# Patient Record
Sex: FEMALE | Race: WHITE | NOT HISPANIC OR LATINO | URBAN - METROPOLITAN AREA
[De-identification: names, ages, dates, MRNs, and addresses within clinical notes are randomized per-mention and may not be internally consistent; named-entity substitution may affect disease eponyms.]

---

## 2017-01-11 ENCOUNTER — INPATIENT (INPATIENT)
Facility: HOSPITAL | Age: 72
LOS: 7 days | Discharge: PSYCHIATRIC OR SUBSTANCE USE FACILITY | DRG: 189 | End: 2017-01-19
Attending: INTERNAL MEDICINE | Admitting: INTERNAL MEDICINE
Payer: MEDICARE

## 2017-01-11 VITALS
SYSTOLIC BLOOD PRESSURE: 100 MMHG | DIASTOLIC BLOOD PRESSURE: 50 MMHG | RESPIRATION RATE: 16 BRPM | WEIGHT: 164.91 LBS | TEMPERATURE: 98 F | OXYGEN SATURATION: 100 % | HEART RATE: 95 BPM

## 2017-01-11 DIAGNOSIS — I24.9 ACUTE ISCHEMIC HEART DISEASE, UNSPECIFIED: ICD-10-CM

## 2017-01-11 DIAGNOSIS — I26.99 OTHER PULMONARY EMBOLISM WITHOUT ACUTE COR PULMONALE: ICD-10-CM

## 2017-01-11 DIAGNOSIS — Z29.9 ENCOUNTER FOR PROPHYLACTIC MEASURES, UNSPECIFIED: ICD-10-CM

## 2017-01-11 DIAGNOSIS — J96.92 RESPIRATORY FAILURE, UNSPECIFIED WITH HYPERCAPNIA: ICD-10-CM

## 2017-01-11 DIAGNOSIS — I48.91 UNSPECIFIED ATRIAL FIBRILLATION: ICD-10-CM

## 2017-01-11 DIAGNOSIS — J44.9 CHRONIC OBSTRUCTIVE PULMONARY DISEASE, UNSPECIFIED: ICD-10-CM

## 2017-01-11 DIAGNOSIS — I10 ESSENTIAL (PRIMARY) HYPERTENSION: ICD-10-CM

## 2017-01-11 DIAGNOSIS — F32.9 MAJOR DEPRESSIVE DISORDER, SINGLE EPISODE, UNSPECIFIED: ICD-10-CM

## 2017-01-11 DIAGNOSIS — F41.9 ANXIETY DISORDER, UNSPECIFIED: ICD-10-CM

## 2017-01-11 LAB
ALBUMIN SERPL ELPH-MCNC: 3.4 G/DL — SIGNIFICANT CHANGE UP (ref 3.3–5)
ALP SERPL-CCNC: 64 U/L — SIGNIFICANT CHANGE UP (ref 40–120)
ALT FLD-CCNC: 31 U/L — SIGNIFICANT CHANGE UP (ref 12–78)
AMYLASE P1 CFR SERPL: 89 U/L — SIGNIFICANT CHANGE UP (ref 25–115)
ANION GAP SERPL CALC-SCNC: 9 MMOL/L — SIGNIFICANT CHANGE UP (ref 5–17)
APTT BLD: 32.4 SEC — SIGNIFICANT CHANGE UP (ref 27.5–37.4)
AST SERPL-CCNC: 32 U/L — SIGNIFICANT CHANGE UP (ref 15–37)
BASE EXCESS BLDA CALC-SCNC: -1.7 MMOL/L — SIGNIFICANT CHANGE UP (ref -2–2)
BASE EXCESS BLDA CALC-SCNC: -3.6 MMOL/L — LOW (ref -2–2)
BASE EXCESS BLDA CALC-SCNC: -3.9 MMOL/L — LOW (ref -2–2)
BILIRUB SERPL-MCNC: 0.5 MG/DL — SIGNIFICANT CHANGE UP (ref 0.2–1.2)
BLOOD GAS COMMENTS ARTERIAL: SIGNIFICANT CHANGE UP
BUN SERPL-MCNC: 58 MG/DL — HIGH (ref 7–23)
CALCIUM SERPL-MCNC: 8.4 MG/DL — LOW (ref 8.5–10.1)
CHLORIDE SERPL-SCNC: 96 MMOL/L — SIGNIFICANT CHANGE UP (ref 96–108)
CK MB CFR SERPL CALC: 10.1 NG/ML — HIGH (ref 0–3.6)
CO2 SERPL-SCNC: 31 MMOL/L — SIGNIFICANT CHANGE UP (ref 22–31)
CREAT SERPL-MCNC: 1.3 MG/DL — SIGNIFICANT CHANGE UP (ref 0.5–1.3)
FLUAV H1 2009 PAND RNA SPEC QL NAA+PROBE: DETECTED
GLUCOSE SERPL-MCNC: 137 MG/DL — HIGH (ref 70–99)
HCO3 BLDA-SCNC: 17 MMOL/L — LOW (ref 23–27)
HCO3 BLDA-SCNC: 19 MMOL/L — LOW (ref 23–27)
HCO3 BLDA-SCNC: 19 MMOL/L — LOW (ref 23–27)
HCT VFR BLD CALC: 45.7 % — HIGH (ref 34.5–45)
HGB BLD-MCNC: 14.9 G/DL — SIGNIFICANT CHANGE UP (ref 11.5–15.5)
HOROWITZ INDEX BLDA+IHG-RTO: 40 — SIGNIFICANT CHANGE UP
HOROWITZ INDEX BLDA+IHG-RTO: 40 — SIGNIFICANT CHANGE UP
INR BLD: 1.07 RATIO — SIGNIFICANT CHANGE UP (ref 0.88–1.16)
LACTATE SERPL-SCNC: 0.7 MMOL/L — SIGNIFICANT CHANGE UP (ref 0.7–2)
LIDOCAIN IGE QN: 208 U/L — SIGNIFICANT CHANGE UP (ref 73–393)
LYMPHOCYTES # BLD AUTO: 12 % — LOW (ref 13–44)
MCHC RBC-ENTMCNC: 32.6 PG — SIGNIFICANT CHANGE UP (ref 27–34)
MCHC RBC-ENTMCNC: 32.7 GM/DL — SIGNIFICANT CHANGE UP (ref 32–36)
MCV RBC AUTO: 99.6 FL — SIGNIFICANT CHANGE UP (ref 80–100)
MONOCYTES NFR BLD AUTO: 15 % — HIGH (ref 1–9)
NEUTROPHILS NFR BLD AUTO: 67 % — SIGNIFICANT CHANGE UP (ref 43–77)
NT-PROBNP SERPL-SCNC: 8880 PG/ML — HIGH (ref 0–125)
PCO2 BLDA: 108 MMHG — CRITICAL HIGH (ref 32–46)
PCO2 BLDA: 109 MMHG — CRITICAL HIGH (ref 32–46)
PCO2 BLDA: 84 MMHG — CRITICAL HIGH (ref 32–46)
PH BLDA: 7.01 — CRITICAL LOW (ref 7.35–7.45)
PH BLDA: 7.04 — CRITICAL LOW (ref 7.35–7.45)
PH BLDA: 7.1 — CRITICAL LOW (ref 7.35–7.45)
PLATELET # BLD AUTO: 216 K/UL — SIGNIFICANT CHANGE UP (ref 150–400)
PO2 BLDA: 107 MMHG — SIGNIFICANT CHANGE UP (ref 74–108)
PO2 BLDA: 72 MMHG — LOW (ref 74–108)
PO2 BLDA: 85 MMHG — SIGNIFICANT CHANGE UP (ref 74–108)
POTASSIUM SERPL-MCNC: 4.2 MMOL/L — SIGNIFICANT CHANGE UP (ref 3.5–5.3)
POTASSIUM SERPL-SCNC: 4.2 MMOL/L — SIGNIFICANT CHANGE UP (ref 3.5–5.3)
PROCALCITONIN SERPL-MCNC: 0.18 NG/ML — HIGH (ref 0–0.05)
PROT SERPL-MCNC: 7.4 G/DL — SIGNIFICANT CHANGE UP (ref 6–8.3)
PROTHROM AB SERPL-ACNC: 11.9 SEC — SIGNIFICANT CHANGE UP (ref 10–13.1)
RAPID RVP RESULT: DETECTED
RBC # BLD: 4.58 M/UL — SIGNIFICANT CHANGE UP (ref 3.8–5.2)
RBC # FLD: 12.5 % — SIGNIFICANT CHANGE UP (ref 10.3–14.5)
SAO2 % BLDA: 88 % — LOW (ref 92–96)
SAO2 % BLDA: 94 % — SIGNIFICANT CHANGE UP (ref 92–96)
SAO2 % BLDA: 95 % — SIGNIFICANT CHANGE UP (ref 92–96)
SODIUM SERPL-SCNC: 136 MMOL/L — SIGNIFICANT CHANGE UP (ref 135–145)
TROPONIN I SERPL-MCNC: 0.14 NG/ML — HIGH (ref 0.01–0.04)
WBC # BLD: 11.1 K/UL — HIGH (ref 3.8–10.5)
WBC # FLD AUTO: 11.1 K/UL — HIGH (ref 3.8–10.5)

## 2017-01-11 PROCEDURE — 99291 CRITICAL CARE FIRST HOUR: CPT

## 2017-01-11 PROCEDURE — 93010 ELECTROCARDIOGRAM REPORT: CPT

## 2017-01-11 PROCEDURE — 99285 EMERGENCY DEPT VISIT HI MDM: CPT

## 2017-01-11 PROCEDURE — 71010: CPT | Mod: 26

## 2017-01-11 RX ORDER — SODIUM CHLORIDE 9 MG/ML
3 INJECTION INTRAMUSCULAR; INTRAVENOUS; SUBCUTANEOUS ONCE
Qty: 0 | Refills: 0 | Status: COMPLETED | OUTPATIENT
Start: 2017-01-11 | End: 2017-01-11

## 2017-01-11 RX ORDER — ONDANSETRON 8 MG/1
4 TABLET, FILM COATED ORAL ONCE
Qty: 0 | Refills: 0 | Status: COMPLETED | OUTPATIENT
Start: 2017-01-11 | End: 2017-01-11

## 2017-01-11 RX ORDER — SODIUM CHLORIDE 9 MG/ML
1000 INJECTION INTRAMUSCULAR; INTRAVENOUS; SUBCUTANEOUS ONCE
Qty: 0 | Refills: 0 | Status: COMPLETED | OUTPATIENT
Start: 2017-01-11 | End: 2017-01-11

## 2017-01-11 RX ORDER — NOREPINEPHRINE BITARTRATE/D5W 8 MG/250ML
0.3 PLASTIC BAG, INJECTION (ML) INTRAVENOUS
Qty: 8 | Refills: 0 | Status: DISCONTINUED | OUTPATIENT
Start: 2017-01-11 | End: 2017-01-12

## 2017-01-11 RX ORDER — ALPRAZOLAM 0.25 MG
1 TABLET ORAL
Qty: 0 | Refills: 0 | COMMUNITY

## 2017-01-11 RX ORDER — AZITHROMYCIN 500 MG/1
500 TABLET, FILM COATED ORAL EVERY 24 HOURS
Qty: 0 | Refills: 0 | Status: DISCONTINUED | OUTPATIENT
Start: 2017-01-11 | End: 2017-01-17

## 2017-01-11 RX ORDER — ASPIRIN/CALCIUM CARB/MAGNESIUM 324 MG
325 TABLET ORAL ONCE
Qty: 0 | Refills: 0 | Status: COMPLETED | OUTPATIENT
Start: 2017-01-11 | End: 2017-01-11

## 2017-01-11 RX ORDER — FUROSEMIDE 40 MG
40 TABLET ORAL ONCE
Qty: 0 | Refills: 0 | Status: COMPLETED | OUTPATIENT
Start: 2017-01-11 | End: 2017-01-11

## 2017-01-11 RX ORDER — PANTOPRAZOLE SODIUM 20 MG/1
40 TABLET, DELAYED RELEASE ORAL DAILY
Qty: 0 | Refills: 0 | Status: DISCONTINUED | OUTPATIENT
Start: 2017-01-11 | End: 2017-01-12

## 2017-01-11 RX ORDER — RIVAROXABAN 15 MG-20MG
1 KIT ORAL
Qty: 0 | Refills: 0 | COMMUNITY

## 2017-01-11 RX ORDER — METOPROLOL TARTRATE 50 MG
50 TABLET ORAL ONCE
Qty: 0 | Refills: 0 | Status: COMPLETED | OUTPATIENT
Start: 2017-01-11 | End: 2017-01-11

## 2017-01-11 RX ORDER — SODIUM CHLORIDE 9 MG/ML
1000 INJECTION INTRAMUSCULAR; INTRAVENOUS; SUBCUTANEOUS
Qty: 0 | Refills: 0 | Status: DISCONTINUED | OUTPATIENT
Start: 2017-01-11 | End: 2017-01-13

## 2017-01-11 RX ORDER — AZITHROMYCIN 500 MG/1
500 TABLET, FILM COATED ORAL ONCE
Qty: 0 | Refills: 0 | Status: COMPLETED | OUTPATIENT
Start: 2017-01-11 | End: 2017-01-11

## 2017-01-11 RX ADMIN — ONDANSETRON 4 MILLIGRAM(S): 8 TABLET, FILM COATED ORAL at 17:09

## 2017-01-11 RX ADMIN — Medication 40 MILLIGRAM(S): at 16:53

## 2017-01-11 RX ADMIN — PANTOPRAZOLE SODIUM 40 MILLIGRAM(S): 20 TABLET, DELAYED RELEASE ORAL at 23:17

## 2017-01-11 RX ADMIN — Medication 325 MILLIGRAM(S): at 16:55

## 2017-01-11 RX ADMIN — SODIUM CHLORIDE 1000 MILLILITER(S): 9 INJECTION INTRAMUSCULAR; INTRAVENOUS; SUBCUTANEOUS at 17:41

## 2017-01-11 RX ADMIN — Medication 30 MILLIGRAM(S): at 23:17

## 2017-01-11 RX ADMIN — Medication 42.08 MICROGRAM(S)/KG/MIN: at 20:21

## 2017-01-11 RX ADMIN — AZITHROMYCIN 255 MILLIGRAM(S): 500 TABLET, FILM COATED ORAL at 17:01

## 2017-01-11 RX ADMIN — SODIUM CHLORIDE 3 MILLILITER(S): 9 INJECTION INTRAMUSCULAR; INTRAVENOUS; SUBCUTANEOUS at 16:10

## 2017-01-11 RX ADMIN — SODIUM CHLORIDE 1000 MILLILITER(S): 9 INJECTION INTRAMUSCULAR; INTRAVENOUS; SUBCUTANEOUS at 19:30

## 2017-01-11 RX ADMIN — SODIUM CHLORIDE 1000 MILLILITER(S): 9 INJECTION INTRAMUSCULAR; INTRAVENOUS; SUBCUTANEOUS at 19:07

## 2017-01-11 RX ADMIN — Medication 50 MILLIGRAM(S): at 16:11

## 2017-01-11 RX ADMIN — SODIUM CHLORIDE 1000 MILLILITER(S): 9 INJECTION INTRAMUSCULAR; INTRAVENOUS; SUBCUTANEOUS at 16:10

## 2017-01-11 NOTE — ED ADULT NURSE REASSESSMENT NOTE - NS ED NURSE REASSESS COMMENT FT1
report received from [facility], pt on bipap sats 100%, on cardiac, sat bp monitor. bp 80/54, md aware and 4th L NS bolus ordered and hung. will await CT after BP stable.

## 2017-01-11 NOTE — H&P ADULT. - NEUROLOGICAL DETAILS
responds to verbal commands/responds to pain/sensation intact responds to verbal commands/responds to pain/cranial nerves intact/normal strength/sensation intact

## 2017-01-11 NOTE — ED ADULT NURSE REASSESSMENT NOTE - NS ED NURSE REASSESS COMMENT FT1
Noted + crackles bilaterally 02Sat 95 % on 5 liters cardiac monitor controlled afib + cough unproductive

## 2017-01-11 NOTE — H&P ADULT. - ASSESSMENT
71 y.o F w/PMHx of Afib, anxiety, COPD, HTN, depression, Nicotine dependence was brought in by EMS for Fatigue, palpitation, SOB, and weakness a/w respiratory failure possible 2/2 URI vs PE and r/o PE and ACS 71 y.o F w/PMHx of Afib, anxiety, COPD, HTN, depression, Nicotine dependence was brought in by EMS for Fatigue, palpitation, SOB, and weakness a/w respiratory failure possible 2/2 influenza URI vs PE and r/o PE and ACS 71 y.o F w/PMHx of Afib, anxiety, COPD, HTN, depression, Nicotine dependence was brought in by EMS for Fatigue, palpitation, SOB, and weakness a/w respiratory failure possible 2/2 COPD exacerbation, with influenza AH3 + URI and r/o PE and ACS 71 y.o F w/PMHx of Afib, anxiety, COPD, HTN, depression, Nicotine dependence was brought in by EMS for Fatigue, palpitation, SOB, and weakness a/w respiratory failure possible 2/2 COPD exacerbation, with influenza AH3 + URI and r/o PE and r/o ACS,Rapid A Fib.

## 2017-01-11 NOTE — ED PROVIDER NOTE - CARE PLAN
Principal Discharge DX:	Respiratory failure with hypercapnia, unspecified chronicity  Instructions for follow-up, activity and diet:	admit  Secondary Diagnosis:	Atrial fibrillation  Secondary Diagnosis:	Rapid atrial fibrillation

## 2017-01-11 NOTE — H&P ADULT. - COMMENTS
on bipap,  only still report sob, denies CP/Abd pain/urinary symptoms /dizziness/HA. on bipap,  only still report SOB, denies CP/Abd pain/urinary symptoms /dizziness/HA.

## 2017-01-11 NOTE — PROVIDER CONTACT NOTE (EICU) - SITUATION
new admission with hypercapnia,  hypoxemia, hypotension, required intubation for failed NIV. smoker daily and influ AH3 positive

## 2017-01-11 NOTE — H&P ADULT. - PROBLEM SELECTOR PLAN 5
-hold home valsartan and  bisoprolol  -future management per ICU team -hold home valsartan and  bisoprolol for Hypotension  -future management per ICU team

## 2017-01-11 NOTE — ED PROVIDER NOTE - CRITICAL CARE PROVIDED
interpretation of diagnostic studies/documentation/direct patient care (not related to procedure)/additional history taking/consultation with other physicians/consult w/ pt's family directly relating to pts condition

## 2017-01-11 NOTE — ED PROVIDER NOTE - PROGRESS NOTE DETAILS
pt initial vitals stable on arrival with reported improvement in symptoms. IVF begun no evidence of sepsis at this time.  Initial labs drawn for cardiac markers.  After initial presentation pt required oxygen to maintain O2 sats.  First requried 2 liters but then increased to 5 liters.  Chest x-ray relieved no acute abnormality so there was concern for viral pt initial vitals stable on arrival with reported improvement in symptoms. IVF begun no evidence of sepsis at this time.  Initial labs drawn for cardiac markers.  After initial presentation pt required oxygen to maintain O2 sats.  First required 2 liters but then increased to 5 liters.  Chest x-ray relieved no acute abnormality so there was concern for viral infection vs PE.  BP then began to trend down despite IVF and ICU was consulted.  Pt accepted to the unit at this time

## 2017-01-11 NOTE — PATIENT PROFILE ADULT. - MENTAL HEALTH CONDITIONS/SYMPTOMS, PROFILE
excessive fear/altered thought process/pt bemoes very derpressed when she gets sick and says she is going to die/loss of interest in self/anxiety disorder

## 2017-01-11 NOTE — H&P ADULT. - PROBLEM SELECTOR PLAN 2
-f/u cardiac enzyme x2   -am EKG  -consult- ( Cardio)  -future management per ICU team -f/u cardiac enzyme x2 ,Abnormal Troponin 2 to demand ischemia  -am EKG  -consult- ( Cardio)  -future management per ICU team

## 2017-01-11 NOTE — PROVIDER CONTACT NOTE (EICU) - RECOMMENDATIONS
- intubation; vent bundle  - would start steroids and azithromycin (COPD)  - tamiflu ordered   - I would perform a CTPA and/or TTE to eval for VTE and this could account for the hypoxia and hypotension. If delay would anticoagulate until we can further evaluate this.

## 2017-01-11 NOTE — H&P ADULT. - PROBLEM SELECTOR PLAN 1
-possible 2/2 URI vs PE   -admit pt to ICU  -intubation  -CTA chest r/o PE  -c/w Levophed for BP support  -AM cbc/CMP   -future management per ICU team -possible 2/2 URI vs PE   -admit pt to ICU  -intubation  -CTA chest r/o PE  -c/w Levophed for BP support  -AM cbc/CMP   -droplet precaution  -Tamiflu  -future management per ICU team -possible 2/2 URI vs PE   -admit pt to ICU  -intubation  -CTA chest r/o PE  -c/w Levophed for BP support  -AM cbc/CMP   -droplet precaution  -Tamiflu for 5 days  -future management per ICU team -possible 2/2 COPD Exacerbation with Flu AH3 + r/o PE   -admit pt to ICU-Ac Hypercapnic,Hypoxic respiratory failure with Respiratory acidosis  -intubation elective  -CTA chest r/o PE  -c/w Levophed for BP support  -AM cbc/CMP   -droplet precaution  -Tamiflu 75 mg q 12 hrs for 5 days  ID Dr FABIÁN [place]  -future management per ICU team -possible 2/2 COPD Exacerbation with Flu AH3 + r/o PE   -admit pt to ICU-Ac Hypercapnic,Hypoxic respiratory failure with Respiratory acidosis  -intubation elective,IV Zithromax   -CTA chest r/o PE  -c/w Levophed for BP support,S/p IV Fluid bolus given in ER  -AM cbc/CMP   -droplet precaution  -Tamiflu to start  ID Dr FABIÁN gutiérrez  -future management per ICU team -possible 2/2 COPD Exacerbation with Flu AH3 + r/o PE   -admit pt to ICU-Ac Hypercapnic,Hypoxic respiratory failure with Respiratory acidosis  -BiPAP elective,IV Zithromax   -CTA chest r/o PE  -c/w Levophed for BP support,S/p IV Fluid bolus given in ER  -AM cbc/CMP   -droplet precaution  -Tamiflu to start  ID Dr FABIÁN gutiérrez  -future management per ICU team -possible 2/2 COPD Exacerbation with Flu AH3 + r/o PE   -admit pt to ICU-Ac Hypercapnic,Hypoxic respiratory failure with Respiratory acidosis  -BiPAP elective,IV Zithromax   -CTA chest r/o PE  -c/w Levophed for BP support,S/p IV Fluid bolus given in ER  -AM CBC/CMP   -droplet precaution  -Tamiflu BID x 5 days  ID Dr Brandt gutiérrez  -future management per ICU team

## 2017-01-11 NOTE — ED PROVIDER NOTE - OBJECTIVE STATEMENT
Pt is a 72 yo female who presents to the ED with a cc of A-fib and SOB.  Pt reports that she has a history of A-fib and COPD.  She reports that the entire family has been experiencing URI symptoms. Since Saturday she reports that she has had a cough productive of clear sputum, diarrhea, nausea, and SOB.  Pt denies fever, V/C, CP, abd pain.  Pt reports that the SOB progressively worsened and on Sunday into Monday she was so SOB she could barely ambulate.  Pt reports that she finally called EMS because she could not walk to the bathroom secondary to her SOB.  Upon EMS arrival pt was noted to be in A-fib RVR.  She was given Cardizem 20 mg IVP was improvement in rate.  Pt reported significant improvement in breathing after this.  Pt denies ever feeling palpations or the sensation that her heart was racing.  She does admit that she did not take her beta blocker today.  Reports that she took her Xarelto.  Pt also report history of anxiety.  Smokes daily but is not on home oxygen.  has one glass of wine every evening

## 2017-01-11 NOTE — ED ADULT TRIAGE NOTE - CHIEF COMPLAINT QUOTE
From c/o rapid heart beat and difficulty breathing.   EMS states heart rate between 140-160 gave 20 mg cardizem w positive results

## 2017-01-11 NOTE — H&P ADULT. - PROBLEM SELECTOR PLAN 3
-c/w Xarelto, hold bisoprolol for now 2/2 hypotension  -future management per ICU team -c/w Xarelto, hold bisoprolol for now 2/2 hypotension  -future management per ICU team & cardiology -c/w Xarelto, hold bisoprolol for now 2/2 hypotension,IV Fluids  -future management per ICU team & cardiology -c/w Xarelto, hold bisoprolol for now 2/2 hypotension, IV Fluids  -future management per ICU team & cardiology

## 2017-01-11 NOTE — H&P ADULT. - PMH
Afib    Anxiety    Arm fracture    Depression    Emphysema of lung    Hypertension    Nicotine dependence

## 2017-01-11 NOTE — H&P ADULT. - HISTORY OF PRESENT ILLNESS
71 y.o F w/PMHx of Afib, anxiety, COPD, HTN, depression, Nicotine dependence was brought in by EMS for Fatigue, palpitation, SOB, and weakness. Per pt's , pt had nonproductive cough, nausea, decrease appetite for 5 days. Per pt 's , pt also reported mild subjective fever an chills, but denies of sore throat/cp/HA/dizziness/constipation/diarrhea/urinary discomfort. Pt's  cold like symptom for last week and live with the pt. Per pt's cece, 71 y.o F w/PMHx of Afib, anxiety, COPD, HTN, depression, Nicotine dependence was brought in by EMS for Fatigue, palpitation, SOB, and weakness. Per pt's , pt had nonproductive cough, nausea, decrease appetite for 5 days. Per pt 's , pt also reported mild subjective fever an chills, but denies of sore throat/cp/HA/dizziness/constipation/diarrhea/urinary discomfort. Pt's  cold like symptom for last week and live with the pt. Per pt's , pt had severe depression, anxiety, nicotine dependent,  she  for years 71 y.o F w/PMHx of Afib, anxiety, COPD, HTN, depression, Nicotine dependence was brought in by EMS for Fatigue, palpitation, SOB, and weakness. Per pt's , pt had nonproductive cough, nausea, decrease appetite for 5 days. Per pt 's , pt also reported mild subjective fever an chills, but denies of sore throat/cp/HA/dizziness/constipation/diarrhea/urinary discomfort. Pt's  cold like symptom for last week and live with the pt. Per pt's , pt had severe depression, anxiety, nicotine dependent,  she limited moved live style for years. not going out of house and sitting in chairs and smoking everyday. Pt denies suicide idea but loss interest in life and very nervous about been sick.  Pt had walker but barely use it.     In ED, Pt was tachycardia at 100s, hypotensive  at 80/54. leukocytosis at 11.1, procalcitonin at elevated at 0.18, BNP elevated at 8880, trop at 0.136 . ABG showed respiratory acidosis. CXR showed hyperinflated lung Pt was put on BIpap, a 71 y.o F w/PMHx of Afib, anxiety, COPD, HTN, depression, Nicotine dependence was brought in by EMS for Fatigue, palpitation, SOB, and weakness. Per pt's , pt had nonproductive cough, nausea, decrease appetite for 5 days. Per pt 's , pt also reported mild subjective fever an chills, but denies of sore throat/cp/HA/dizziness/constipation/diarrhea/urinary discomfort. Pt's  cold like symptom for last week and live with the pt. Today, pt was labor breathing and worsening SOB and worsening weakness.  call the EMS.  Per pt's , pt had severe depression, anxiety, nicotine dependent,  she limited moved live style for years. not going out of house and sitting in chairs and smoking everyday. Pt denies suicide idea but loss interest in life and very nervous about been sick.  Pt had walker but barely use it.     In ED, Pt was tachycardia at 100s, hypotensive  at 80/54. leukocytosis at 11.1, procalcitonin at elevated at 0.18, BNP elevated at 8880, trop at 0.136 . ABG showed respiratory acidosis. CXR showed hyperinflated lung.  Pt was put on BIpap for respiratory stress.later , pt develop hypotension and was evaluated by ICU PA and Carido( ). Pt is on levophed drip and will be admit to ICU. Pt 's ABG worse and pt was intubate in ICU. CTA chest pending after the intubation. 71 y.o F w/PMHx of Afib, anxiety, COPD, HTN, depression, Nicotine dependence was brought in by EMS for Fatigue, palpitation, SOB, and weakness. Per pt's , pt had nonproductive cough, nausea, decrease appetite for 5 days. Per pt 's , pt also reported mild subjective fever an chills, but denies of sore throat/cp/HA/dizziness/constipation/diarrhea/urinary discomfort. Pt's  cold like symptom for last week and live with the pt. Today, pt was labor breathing and worsening SOB and worsening weakness.  call the EMS.  Per pt's , pt had severe depression, anxiety, nicotine dependent,  she limited moved live style for years. not going out of house and sitting in chairs and smoking everyday. Pt denies suicide idea but loss interest in life and very nervous about been sick.  Pt had walker but barely use it.     In ED, Pt was tachycardia at 100s, hypotensive  at 80/54. leukocytosis at 11.1, procalcitonin at elevated at 0.18, BNP elevated at 8880, trop at 0.136 . ABG showed respiratory acidosis. Influenza A+ , CXR showed hyperinflated lung.  Pt was put on BIpap for respiratory stress.later , pt develop hypotension and was evaluated by ICU PA and Carido( ). Pt is on levophed drip and will be admit to ICU. Pt 's ABG worse and pt was intubate in ICU. CTA chest pending after the intubation. 71 y.o F w/PMHx of Afib, anxiety, COPD, HTN, depression, Nicotine dependence was brought in by EMS for Fatigue, palpitation, SOB, and weakness. Per pt's , pt had nonproductive cough, nausea, decrease appetite for 5 days. Per pt 's , pt also reported mild subjective fever an chills, but denies of sore throat/cp/HA/dizziness/constipation/diarrhea/urinary discomfort. Pt's  cold like symptom for last week and live with the pt. Today, pt was labor breathing and worsening SOB and worsening weakness.  call the EMS.  Per pt's , pt had severe depression, anxiety, nicotine dependent,  she limited moved live style for years. not going out of house and sitting in chairs and smoking everyday. Pt denies suicide idea but loss interest in life and very nervous about been sick.  Pt had walker but barely use it.     In ED, Pt was tachycardia at 100s, hypotensive  at 80/54. leukocytosis at 11.1, procalcitonin at elevated at 0.18, BNP elevated at 8880, trop at 0.136 . ABG showed respiratory acidosis. Influenza A+ , CXR showed hyperinflated lung.  Pt was put on BIpap for respiratory stress.later , pt develop hypotension and was evaluated by ICU PA and Carido( ). Pt is on levophed drip and will be admit to ICU. Pt 's ABG worse and pt was placed on BiPAP . CTA chest pending after the intubation.

## 2017-01-11 NOTE — H&P ADULT. - PROBLEM SELECTOR PLAN 8
-AC depend on ICU team and CTA chest result  - fall precaution/ bed rest  -NPO  -aspiration precaution  --c/w citalopram and xanax   -consult ( Psych)   -future management per ICU team -AC,pt  on  Xarelto ,CTA chest Angio-Pending  - fall precaution/ bed rest  -NPO  -aspiration precaution  --c/w citalopram and xanax   -consult ( Psych)   -future management per ICU team

## 2017-01-11 NOTE — H&P ADULT. - PROBLEM SELECTOR PLAN 4
-vent support  -future management per ICU team -vent support, Nebs, SMOKING cessation  -future management per ICU team  -Pulmonary Dr Link

## 2017-01-11 NOTE — H&P ADULT. - GASTROINTESTINAL DETAILS
no distention/no masses palpable/soft/no bruit/bowel sounds normal/nontender no rigidity/no masses palpable/soft/nontender/no distention/no bruit/bowel sounds normal/no organomegaly

## 2017-01-11 NOTE — PATIENT PROFILE ADULT. - REASON FOR ADMISSION
difficulty breathing called ambulance couldn't get oob difficulty breathing called ambulance couldn't get oob.  pt has white coat syndrome and becomes depressed and withdrawn and fears dying when sick

## 2017-01-11 NOTE — ED ADULT NURSE NOTE - OBJECTIVE STATEMENT
71 yr old female brought in from home with  difficulty breathing brought in by EMS Cardizem given in the field HR 97 -102  . + cough unproductive

## 2017-01-12 ENCOUNTER — CLINICAL ADVICE (OUTPATIENT)
Age: 72
End: 2017-01-12

## 2017-01-12 LAB
ALBUMIN SERPL ELPH-MCNC: 2.6 G/DL — LOW (ref 3.3–5)
ALP SERPL-CCNC: 93 U/L — SIGNIFICANT CHANGE UP (ref 40–120)
ALT FLD-CCNC: 93 U/L — HIGH (ref 12–78)
ANION GAP SERPL CALC-SCNC: 7 MMOL/L — SIGNIFICANT CHANGE UP (ref 5–17)
APPEARANCE UR: ABNORMAL
AST SERPL-CCNC: 112 U/L — HIGH (ref 15–37)
BASE EXCESS BLDA CALC-SCNC: -1.5 MMOL/L — SIGNIFICANT CHANGE UP (ref -2–2)
BASE EXCESS BLDA CALC-SCNC: -2.6 MMOL/L — LOW (ref -2–2)
BASE EXCESS BLDA CALC-SCNC: -2.7 MMOL/L — LOW (ref -2–2)
BILIRUB SERPL-MCNC: 0.3 MG/DL — SIGNIFICANT CHANGE UP (ref 0.2–1.2)
BILIRUB UR-MCNC: NEGATIVE — SIGNIFICANT CHANGE UP
BLOOD GAS COMMENTS ARTERIAL: SIGNIFICANT CHANGE UP
BUN SERPL-MCNC: 43 MG/DL — HIGH (ref 7–23)
CALCIUM SERPL-MCNC: 7 MG/DL — LOW (ref 8.5–10.1)
CHLORIDE SERPL-SCNC: 108 MMOL/L — SIGNIFICANT CHANGE UP (ref 96–108)
CK MB BLD-MCNC: 1.8 % — SIGNIFICANT CHANGE UP (ref 0–3.5)
CK MB CFR SERPL CALC: 11.5 NG/ML — HIGH (ref 0–3.6)
CK SERPL-CCNC: 657 U/L — HIGH (ref 26–192)
CO2 SERPL-SCNC: 25 MMOL/L — SIGNIFICANT CHANGE UP (ref 22–31)
COLOR SPEC: YELLOW — SIGNIFICANT CHANGE UP
CREAT SERPL-MCNC: 0.87 MG/DL — SIGNIFICANT CHANGE UP (ref 0.5–1.3)
DIFF PNL FLD: ABNORMAL
GLUCOSE SERPL-MCNC: 132 MG/DL — HIGH (ref 70–99)
GLUCOSE UR QL: NEGATIVE — SIGNIFICANT CHANGE UP
HCO3 BLDA-SCNC: 20 MMOL/L — LOW (ref 23–27)
HCO3 BLDA-SCNC: 21 MMOL/L — LOW (ref 23–27)
HCO3 BLDA-SCNC: 21 MMOL/L — LOW (ref 23–27)
HCT VFR BLD CALC: 43.8 % — SIGNIFICANT CHANGE UP (ref 34.5–45)
HGB BLD-MCNC: 14.3 G/DL — SIGNIFICANT CHANGE UP (ref 11.5–15.5)
HOROWITZ INDEX BLDA+IHG-RTO: 40 — SIGNIFICANT CHANGE UP
KETONES UR-MCNC: NEGATIVE — SIGNIFICANT CHANGE UP
LEUKOCYTE ESTERASE UR-ACNC: NEGATIVE — SIGNIFICANT CHANGE UP
MCHC RBC-ENTMCNC: 32.5 PG — SIGNIFICANT CHANGE UP (ref 27–34)
MCHC RBC-ENTMCNC: 32.7 GM/DL — SIGNIFICANT CHANGE UP (ref 32–36)
MCV RBC AUTO: 99.5 FL — SIGNIFICANT CHANGE UP (ref 80–100)
NITRITE UR-MCNC: NEGATIVE — SIGNIFICANT CHANGE UP
NT-PROBNP SERPL-SCNC: 4750 PG/ML — HIGH (ref 0–125)
PCO2 BLDA: 55 MMHG — HIGH (ref 32–46)
PCO2 BLDA: 73 MMHG — CRITICAL HIGH (ref 32–46)
PCO2 BLDA: 76 MMHG — CRITICAL HIGH (ref 32–46)
PH BLDA: 7.15 — CRITICAL LOW (ref 7.35–7.45)
PH BLDA: 7.18 — CRITICAL LOW (ref 7.35–7.45)
PH BLDA: 7.25 — LOW (ref 7.35–7.45)
PH UR: 5 — SIGNIFICANT CHANGE UP (ref 4.8–8)
PLATELET # BLD AUTO: 195 K/UL — SIGNIFICANT CHANGE UP (ref 150–400)
PO2 BLDA: 76 MMHG — SIGNIFICANT CHANGE UP (ref 74–108)
PO2 BLDA: 79 MMHG — SIGNIFICANT CHANGE UP (ref 74–108)
PO2 BLDA: 84 MMHG — SIGNIFICANT CHANGE UP (ref 74–108)
POTASSIUM SERPL-MCNC: 5 MMOL/L — SIGNIFICANT CHANGE UP (ref 3.5–5.3)
POTASSIUM SERPL-SCNC: 5 MMOL/L — SIGNIFICANT CHANGE UP (ref 3.5–5.3)
PROT SERPL-MCNC: 5.9 G/DL — LOW (ref 6–8.3)
PROT UR-MCNC: 25 MG/DL
RBC # BLD: 4.4 M/UL — SIGNIFICANT CHANGE UP (ref 3.8–5.2)
RBC # FLD: 12.7 % — SIGNIFICANT CHANGE UP (ref 10.3–14.5)
SAO2 % BLDA: 94 % — SIGNIFICANT CHANGE UP (ref 92–96)
SAO2 % BLDA: 94 % — SIGNIFICANT CHANGE UP (ref 92–96)
SAO2 % BLDA: 96 % — SIGNIFICANT CHANGE UP (ref 92–96)
SODIUM SERPL-SCNC: 140 MMOL/L — SIGNIFICANT CHANGE UP (ref 135–145)
SP GR SPEC: 1.02 — SIGNIFICANT CHANGE UP (ref 1.01–1.02)
T3 SERPL-MCNC: 82 NG/DL — SIGNIFICANT CHANGE UP (ref 80–200)
T4 AB SER-ACNC: 5.9 UG/DL — SIGNIFICANT CHANGE UP (ref 4.6–12)
TROPONIN I SERPL-MCNC: 0.09 NG/ML — HIGH (ref 0.01–0.04)
TSH SERPL-MCNC: 0.35 UIU/ML — LOW (ref 0.36–3.74)
UROBILINOGEN FLD QL: NEGATIVE — SIGNIFICANT CHANGE UP
WBC # BLD: 11.8 K/UL — HIGH (ref 3.8–10.5)
WBC # FLD AUTO: 11.8 K/UL — HIGH (ref 3.8–10.5)

## 2017-01-12 PROCEDURE — 99233 SBSQ HOSP IP/OBS HIGH 50: CPT

## 2017-01-12 PROCEDURE — 71275 CT ANGIOGRAPHY CHEST: CPT | Mod: 26

## 2017-01-12 PROCEDURE — 99291 CRITICAL CARE FIRST HOUR: CPT

## 2017-01-12 PROCEDURE — 93010 ELECTROCARDIOGRAM REPORT: CPT

## 2017-01-12 PROCEDURE — 93306 TTE W/DOPPLER COMPLETE: CPT | Mod: 26

## 2017-01-12 RX ORDER — NICOTINE POLACRILEX 2 MG
1 GUM BUCCAL DAILY
Qty: 0 | Refills: 0 | Status: DISCONTINUED | OUTPATIENT
Start: 2017-01-12 | End: 2017-01-19

## 2017-01-12 RX ORDER — DIGOXIN 250 MCG
0.25 TABLET ORAL EVERY 6 HOURS
Qty: 0 | Refills: 0 | Status: COMPLETED | OUTPATIENT
Start: 2017-01-12 | End: 2017-01-13

## 2017-01-12 RX ORDER — CITALOPRAM 10 MG/1
10 TABLET, FILM COATED ORAL DAILY
Qty: 0 | Refills: 0 | Status: DISCONTINUED | OUTPATIENT
Start: 2017-01-12 | End: 2017-01-17

## 2017-01-12 RX ORDER — METOPROLOL TARTRATE 50 MG
5 TABLET ORAL ONCE
Qty: 0 | Refills: 0 | Status: COMPLETED | OUTPATIENT
Start: 2017-01-12 | End: 2017-01-12

## 2017-01-12 RX ORDER — ENOXAPARIN SODIUM 100 MG/ML
80 INJECTION SUBCUTANEOUS EVERY 12 HOURS
Qty: 0 | Refills: 0 | Status: DISCONTINUED | OUTPATIENT
Start: 2017-01-12 | End: 2017-01-15

## 2017-01-12 RX ORDER — METOPROLOL TARTRATE 50 MG
25 TABLET ORAL
Qty: 0 | Refills: 0 | Status: DISCONTINUED | OUTPATIENT
Start: 2017-01-12 | End: 2017-01-13

## 2017-01-12 RX ORDER — ONDANSETRON 8 MG/1
4 TABLET, FILM COATED ORAL EVERY 8 HOURS
Qty: 0 | Refills: 0 | Status: DISCONTINUED | OUTPATIENT
Start: 2017-01-12 | End: 2017-01-19

## 2017-01-12 RX ORDER — PANTOPRAZOLE SODIUM 20 MG/1
40 TABLET, DELAYED RELEASE ORAL
Qty: 0 | Refills: 0 | Status: DISCONTINUED | OUTPATIENT
Start: 2017-01-12 | End: 2017-01-12

## 2017-01-12 RX ORDER — ALPRAZOLAM 0.25 MG
0.5 TABLET ORAL THREE TIMES A DAY
Qty: 0 | Refills: 0 | Status: DISCONTINUED | OUTPATIENT
Start: 2017-01-13 | End: 2017-01-19

## 2017-01-12 RX ORDER — PHENYLEPHRINE HYDROCHLORIDE 10 MG/ML
1 INJECTION INTRAVENOUS
Qty: 80 | Refills: 0 | Status: DISCONTINUED | OUTPATIENT
Start: 2017-01-12 | End: 2017-01-13

## 2017-01-12 RX ORDER — ALBUTEROL 90 UG/1
2.5 AEROSOL, METERED ORAL EVERY 6 HOURS
Qty: 0 | Refills: 0 | Status: DISCONTINUED | OUTPATIENT
Start: 2017-01-12 | End: 2017-01-17

## 2017-01-12 RX ORDER — DIGOXIN 250 MCG
0.5 TABLET ORAL ONCE
Qty: 0 | Refills: 0 | Status: COMPLETED | OUTPATIENT
Start: 2017-01-12 | End: 2017-01-12

## 2017-01-12 RX ORDER — ONDANSETRON 8 MG/1
4 TABLET, FILM COATED ORAL EVERY 6 HOURS
Qty: 0 | Refills: 0 | Status: DISCONTINUED | OUTPATIENT
Start: 2017-01-12 | End: 2017-01-12

## 2017-01-12 RX ORDER — MIDODRINE HYDROCHLORIDE 2.5 MG/1
10 TABLET ORAL EVERY 8 HOURS
Qty: 0 | Refills: 0 | Status: DISCONTINUED | OUTPATIENT
Start: 2017-01-12 | End: 2017-01-13

## 2017-01-12 RX ORDER — ALPRAZOLAM 0.25 MG
0.5 TABLET ORAL ONCE
Qty: 0 | Refills: 0 | Status: DISCONTINUED | OUTPATIENT
Start: 2017-01-12 | End: 2017-01-12

## 2017-01-12 RX ADMIN — MIDODRINE HYDROCHLORIDE 10 MILLIGRAM(S): 2.5 TABLET ORAL at 22:20

## 2017-01-12 RX ADMIN — Medication 1 PATCH: at 11:06

## 2017-01-12 RX ADMIN — Medication 30 MILLIGRAM(S): at 10:57

## 2017-01-12 RX ADMIN — AZITHROMYCIN 255 MILLIGRAM(S): 500 TABLET, FILM COATED ORAL at 19:38

## 2017-01-12 RX ADMIN — SODIUM CHLORIDE 100 MILLILITER(S): 9 INJECTION INTRAMUSCULAR; INTRAVENOUS; SUBCUTANEOUS at 17:11

## 2017-01-12 RX ADMIN — Medication 42.08 MICROGRAM(S)/KG/MIN: at 01:56

## 2017-01-12 RX ADMIN — Medication 25 MILLIGRAM(S): at 17:34

## 2017-01-12 RX ADMIN — Medication 0.5 MILLIGRAM(S): at 17:33

## 2017-01-12 RX ADMIN — SODIUM CHLORIDE 100 MILLILITER(S): 9 INJECTION INTRAMUSCULAR; INTRAVENOUS; SUBCUTANEOUS at 06:59

## 2017-01-12 RX ADMIN — SODIUM CHLORIDE 100 MILLILITER(S): 9 INJECTION INTRAMUSCULAR; INTRAVENOUS; SUBCUTANEOUS at 01:56

## 2017-01-12 RX ADMIN — ENOXAPARIN SODIUM 80 MILLIGRAM(S): 100 INJECTION SUBCUTANEOUS at 06:59

## 2017-01-12 RX ADMIN — ENOXAPARIN SODIUM 80 MILLIGRAM(S): 100 INJECTION SUBCUTANEOUS at 17:34

## 2017-01-12 RX ADMIN — ALBUTEROL 2.5 MILLIGRAM(S): 90 AEROSOL, METERED ORAL at 09:39

## 2017-01-12 RX ADMIN — Medication 40 MILLIGRAM(S): at 06:59

## 2017-01-12 RX ADMIN — MIDODRINE HYDROCHLORIDE 10 MILLIGRAM(S): 2.5 TABLET ORAL at 13:59

## 2017-01-12 RX ADMIN — ALBUTEROL 2.5 MILLIGRAM(S): 90 AEROSOL, METERED ORAL at 13:40

## 2017-01-12 RX ADMIN — PANTOPRAZOLE SODIUM 40 MILLIGRAM(S): 20 TABLET, DELAYED RELEASE ORAL at 10:57

## 2017-01-12 RX ADMIN — ALBUTEROL 2.5 MILLIGRAM(S): 90 AEROSOL, METERED ORAL at 19:29

## 2017-01-12 RX ADMIN — Medication 75 MILLIGRAM(S): at 17:34

## 2017-01-12 RX ADMIN — PHENYLEPHRINE HYDROCHLORIDE 30.15 MICROGRAM(S)/KG/MIN: 10 INJECTION INTRAVENOUS at 17:11

## 2017-01-12 RX ADMIN — Medication 0.5 MILLIGRAM(S): at 17:10

## 2017-01-12 RX ADMIN — Medication 40 MILLIGRAM(S): at 17:33

## 2017-01-12 RX ADMIN — Medication 42.08 MICROGRAM(S)/KG/MIN: at 06:58

## 2017-01-12 RX ADMIN — Medication 0.25 MILLIGRAM(S): at 22:50

## 2017-01-12 RX ADMIN — Medication 5 MILLIGRAM(S): at 13:59

## 2017-01-12 NOTE — PHARMACY COMMUNICATION NOTE - COMMENTS
IV to PO Protonix per Pharmacy Policy
75mg po 2xday changed to 30mg po 2xday for crcl 47 < 60 per renal dosing policy

## 2017-01-12 NOTE — DIETITIAN INITIAL EVALUATION ADULT. - PROBLEM SELECTOR PLAN 2
-f/u cardiac enzyme x2 ,Abnormal Troponin 2 to demand ischemia  -am EKG  -consult- ( Cardio)  -future management per ICU team

## 2017-01-12 NOTE — INPATIENT CERTIFICATION FOR MEDICARE PATIENTS - RISKS OF ADVERSE EVENTS
Concern for cardiopulmonary deterioration/Concern for delay in diagnosis and treatment/Concern for worsening infectious process

## 2017-01-12 NOTE — DIETITIAN INITIAL EVALUATION ADULT. - PROBLEM SELECTOR PLAN 3
-c/w Xarelto, hold bisoprolol for now 2/2 hypotension,IV Fluids  -future management per ICU team & cardiology

## 2017-01-12 NOTE — DIETITIAN INITIAL EVALUATION ADULT. - PROBLEM SELECTOR PLAN 1
-possible 2/2 COPD Exacerbation with Flu AH3 + r/o PE   -admit pt to ICU-Ac Hypercapnic,Hypoxic respiratory failure with Respiratory acidosis  -BiPAP elective,IV Zithromax   -CTA chest r/o PE  -c/w Levophed for BP support,S/p IV Fluid bolus given in ER  -AM cbc/CMP   -droplet precaution  -Tamiflu to start  ID Dr FABIÁN gutiérrez  -future management per ICU team

## 2017-01-12 NOTE — DIETITIAN INITIAL EVALUATION ADULT. - PROBLEM SELECTOR PLAN 8
-AC,pt  on  Xarelto ,CTA chest Angio-Pending  - fall precaution/ bed rest  -NPO  -aspiration precaution  --c/w citalopram and xanax   -consult ( Psych)   -future management per ICU team

## 2017-01-13 LAB
ALBUMIN SERPL ELPH-MCNC: 2.4 G/DL — LOW (ref 3.3–5)
ALP SERPL-CCNC: 70 U/L — SIGNIFICANT CHANGE UP (ref 40–120)
ALT FLD-CCNC: 55 U/L — SIGNIFICANT CHANGE UP (ref 12–78)
ANION GAP SERPL CALC-SCNC: 8 MMOL/L — SIGNIFICANT CHANGE UP (ref 5–17)
AST SERPL-CCNC: 35 U/L — SIGNIFICANT CHANGE UP (ref 15–37)
BASE EXCESS BLDA CALC-SCNC: -0.3 MMOL/L — SIGNIFICANT CHANGE UP (ref -2–2)
BASOPHILS # BLD AUTO: 0 K/UL — SIGNIFICANT CHANGE UP (ref 0–0.2)
BASOPHILS NFR BLD AUTO: 0.9 % — SIGNIFICANT CHANGE UP (ref 0–2)
BILIRUB SERPL-MCNC: 0.4 MG/DL — SIGNIFICANT CHANGE UP (ref 0.2–1.2)
BLOOD GAS COMMENTS ARTERIAL: SIGNIFICANT CHANGE UP
BLOOD GAS COMMENTS ARTERIAL: SIGNIFICANT CHANGE UP
BUN SERPL-MCNC: 27 MG/DL — HIGH (ref 7–23)
CALCIUM SERPL-MCNC: 7.5 MG/DL — LOW (ref 8.5–10.1)
CHLORIDE SERPL-SCNC: 111 MMOL/L — HIGH (ref 96–108)
CO2 SERPL-SCNC: 26 MMOL/L — SIGNIFICANT CHANGE UP (ref 22–31)
CREAT SERPL-MCNC: 0.64 MG/DL — SIGNIFICANT CHANGE UP (ref 0.5–1.3)
CULTURE RESULTS: NO GROWTH — SIGNIFICANT CHANGE UP
EOSINOPHIL # BLD AUTO: 0 K/UL — SIGNIFICANT CHANGE UP (ref 0–0.5)
EOSINOPHIL NFR BLD AUTO: 0.1 % — SIGNIFICANT CHANGE UP (ref 0–6)
GLUCOSE SERPL-MCNC: 124 MG/DL — HIGH (ref 70–99)
HCO3 BLDA-SCNC: 24 MMOL/L — SIGNIFICANT CHANGE UP (ref 23–27)
HCT VFR BLD CALC: 36.4 % — SIGNIFICANT CHANGE UP (ref 34.5–45)
HGB BLD-MCNC: 12 G/DL — SIGNIFICANT CHANGE UP (ref 11.5–15.5)
HOROWITZ INDEX BLDA+IHG-RTO: 40 — SIGNIFICANT CHANGE UP
LYMPHOCYTES # BLD AUTO: 0.6 K/UL — LOW (ref 1–3.3)
LYMPHOCYTES # BLD AUTO: 12.1 % — LOW (ref 13–44)
MAGNESIUM SERPL-MCNC: 2.2 MG/DL — SIGNIFICANT CHANGE UP (ref 1.8–2.4)
MCHC RBC-ENTMCNC: 32.8 GM/DL — SIGNIFICANT CHANGE UP (ref 32–36)
MCHC RBC-ENTMCNC: 32.9 PG — SIGNIFICANT CHANGE UP (ref 27–34)
MCV RBC AUTO: 100.4 FL — HIGH (ref 80–100)
MONOCYTES # BLD AUTO: 0.4 K/UL — SIGNIFICANT CHANGE UP (ref 0–0.9)
MONOCYTES NFR BLD AUTO: 8.1 % — SIGNIFICANT CHANGE UP (ref 1–9)
NEUTROPHILS # BLD AUTO: 4 K/UL — SIGNIFICANT CHANGE UP (ref 1.8–7.4)
NEUTROPHILS NFR BLD AUTO: 78.8 % — HIGH (ref 43–77)
PCO2 BLDA: 39 MMHG — SIGNIFICANT CHANGE UP (ref 32–46)
PH BLDA: 7.4 — SIGNIFICANT CHANGE UP (ref 7.35–7.45)
PHOSPHATE SERPL-MCNC: 1.5 MG/DL — LOW (ref 2.5–4.5)
PLATELET # BLD AUTO: 154 K/UL — SIGNIFICANT CHANGE UP (ref 150–400)
PO2 BLDA: 61 MMHG — LOW (ref 74–108)
POTASSIUM SERPL-MCNC: 3.9 MMOL/L — SIGNIFICANT CHANGE UP (ref 3.5–5.3)
POTASSIUM SERPL-SCNC: 3.9 MMOL/L — SIGNIFICANT CHANGE UP (ref 3.5–5.3)
PROT SERPL-MCNC: 5.5 G/DL — LOW (ref 6–8.3)
RBC # BLD: 3.63 M/UL — LOW (ref 3.8–5.2)
RBC # FLD: 12.3 % — SIGNIFICANT CHANGE UP (ref 10.3–14.5)
SAO2 % BLDA: 93 % — SIGNIFICANT CHANGE UP (ref 92–96)
SODIUM SERPL-SCNC: 145 MMOL/L — SIGNIFICANT CHANGE UP (ref 135–145)
SPECIMEN SOURCE: SIGNIFICANT CHANGE UP
WBC # BLD: 5.1 K/UL — SIGNIFICANT CHANGE UP (ref 3.8–10.5)
WBC # FLD AUTO: 5.1 K/UL — SIGNIFICANT CHANGE UP (ref 3.8–10.5)

## 2017-01-13 PROCEDURE — 74177 CT ABD & PELVIS W/CONTRAST: CPT | Mod: 26

## 2017-01-13 PROCEDURE — 99233 SBSQ HOSP IP/OBS HIGH 50: CPT

## 2017-01-13 PROCEDURE — 90792 PSYCH DIAG EVAL W/MED SRVCS: CPT

## 2017-01-13 RX ORDER — DIGOXIN 250 MCG
0.12 TABLET ORAL DAILY
Qty: 0 | Refills: 0 | Status: DISCONTINUED | OUTPATIENT
Start: 2017-01-13 | End: 2017-01-19

## 2017-01-13 RX ORDER — IOHEXOL 300 MG/ML
500 INJECTION, SOLUTION INTRAVENOUS
Qty: 0 | Refills: 0 | Status: COMPLETED | OUTPATIENT
Start: 2017-01-13 | End: 2017-01-13

## 2017-01-13 RX ORDER — FLUTICASONE PROPIONATE AND SALMETEROL 50; 250 UG/1; UG/1
1 POWDER ORAL; RESPIRATORY (INHALATION)
Qty: 0 | Refills: 0 | Status: DISCONTINUED | OUTPATIENT
Start: 2017-01-13 | End: 2017-01-19

## 2017-01-13 RX ORDER — POTASSIUM PHOSPHATE, MONOBASIC POTASSIUM PHOSPHATE, DIBASIC 236; 224 MG/ML; MG/ML
15 INJECTION, SOLUTION INTRAVENOUS ONCE
Qty: 0 | Refills: 0 | Status: COMPLETED | OUTPATIENT
Start: 2017-01-13 | End: 2017-01-13

## 2017-01-13 RX ORDER — TIOTROPIUM BROMIDE 18 UG/1
1 CAPSULE ORAL; RESPIRATORY (INHALATION) DAILY
Qty: 0 | Refills: 0 | Status: DISCONTINUED | OUTPATIENT
Start: 2017-01-13 | End: 2017-01-19

## 2017-01-13 RX ORDER — METOPROLOL TARTRATE 50 MG
50 TABLET ORAL
Qty: 0 | Refills: 0 | Status: DISCONTINUED | OUTPATIENT
Start: 2017-01-13 | End: 2017-01-16

## 2017-01-13 RX ADMIN — ENOXAPARIN SODIUM 80 MILLIGRAM(S): 100 INJECTION SUBCUTANEOUS at 05:40

## 2017-01-13 RX ADMIN — CITALOPRAM 10 MILLIGRAM(S): 10 TABLET, FILM COATED ORAL at 11:36

## 2017-01-13 RX ADMIN — IOHEXOL 500 MILLILITER(S): 300 INJECTION, SOLUTION INTRAVENOUS at 13:23

## 2017-01-13 RX ADMIN — Medication 75 MILLIGRAM(S): at 05:40

## 2017-01-13 RX ADMIN — POTASSIUM PHOSPHATE, MONOBASIC POTASSIUM PHOSPHATE, DIBASIC 63.75 MILLIMOLE(S): 236; 224 INJECTION, SOLUTION INTRAVENOUS at 19:37

## 2017-01-13 RX ADMIN — ENOXAPARIN SODIUM 80 MILLIGRAM(S): 100 INJECTION SUBCUTANEOUS at 18:06

## 2017-01-13 RX ADMIN — ALBUTEROL 2.5 MILLIGRAM(S): 90 AEROSOL, METERED ORAL at 19:29

## 2017-01-13 RX ADMIN — ALBUTEROL 2.5 MILLIGRAM(S): 90 AEROSOL, METERED ORAL at 01:36

## 2017-01-13 RX ADMIN — Medication 40 MILLIGRAM(S): at 05:40

## 2017-01-13 RX ADMIN — Medication 1 PATCH: at 13:26

## 2017-01-13 RX ADMIN — SODIUM CHLORIDE 75 MILLILITER(S): 9 INJECTION INTRAMUSCULAR; INTRAVENOUS; SUBCUTANEOUS at 03:08

## 2017-01-13 RX ADMIN — FLUTICASONE PROPIONATE AND SALMETEROL 1 DOSE(S): 50; 250 POWDER ORAL; RESPIRATORY (INHALATION) at 19:52

## 2017-01-13 RX ADMIN — ALBUTEROL 2.5 MILLIGRAM(S): 90 AEROSOL, METERED ORAL at 08:46

## 2017-01-13 RX ADMIN — AZITHROMYCIN 255 MILLIGRAM(S): 500 TABLET, FILM COATED ORAL at 19:18

## 2017-01-13 RX ADMIN — Medication 1 PATCH: at 11:10

## 2017-01-13 RX ADMIN — ALBUTEROL 2.5 MILLIGRAM(S): 90 AEROSOL, METERED ORAL at 14:43

## 2017-01-13 RX ADMIN — Medication 25 MILLIGRAM(S): at 05:40

## 2017-01-13 RX ADMIN — Medication 75 MILLIGRAM(S): at 18:06

## 2017-01-13 RX ADMIN — POTASSIUM PHOSPHATE, MONOBASIC POTASSIUM PHOSPHATE, DIBASIC 63.75 MILLIMOLE(S): 236; 224 INJECTION, SOLUTION INTRAVENOUS at 11:10

## 2017-01-13 RX ADMIN — IOHEXOL 500 MILLILITER(S): 300 INJECTION, SOLUTION INTRAVENOUS at 13:24

## 2017-01-13 RX ADMIN — Medication 0.25 MILLIGRAM(S): at 05:29

## 2017-01-13 RX ADMIN — MIDODRINE HYDROCHLORIDE 10 MILLIGRAM(S): 2.5 TABLET ORAL at 05:40

## 2017-01-13 RX ADMIN — Medication 50 MILLIGRAM(S): at 18:06

## 2017-01-13 RX ADMIN — Medication 0.5 MILLIGRAM(S): at 05:40

## 2017-01-14 LAB
ALBUMIN SERPL ELPH-MCNC: 2.7 G/DL — LOW (ref 3.3–5)
ALP SERPL-CCNC: 65 U/L — SIGNIFICANT CHANGE UP (ref 40–120)
ALT FLD-CCNC: 55 U/L — SIGNIFICANT CHANGE UP (ref 12–78)
ANION GAP SERPL CALC-SCNC: 3 MMOL/L — LOW (ref 5–17)
AST SERPL-CCNC: 21 U/L — SIGNIFICANT CHANGE UP (ref 15–37)
BASOPHILS # BLD AUTO: 0.1 K/UL — SIGNIFICANT CHANGE UP (ref 0–0.2)
BASOPHILS NFR BLD AUTO: 0.8 % — SIGNIFICANT CHANGE UP (ref 0–2)
BILIRUB SERPL-MCNC: 0.4 MG/DL — SIGNIFICANT CHANGE UP (ref 0.2–1.2)
BUN SERPL-MCNC: 19 MG/DL — SIGNIFICANT CHANGE UP (ref 7–23)
CALCIUM SERPL-MCNC: 7.8 MG/DL — LOW (ref 8.5–10.1)
CHLORIDE SERPL-SCNC: 109 MMOL/L — HIGH (ref 96–108)
CO2 SERPL-SCNC: 32 MMOL/L — HIGH (ref 22–31)
CREAT SERPL-MCNC: 0.59 MG/DL — SIGNIFICANT CHANGE UP (ref 0.5–1.3)
DIGOXIN SERPL-MCNC: 1.3 NG/ML — SIGNIFICANT CHANGE UP (ref 0.8–2)
EOSINOPHIL # BLD AUTO: 0 K/UL — SIGNIFICANT CHANGE UP (ref 0–0.5)
EOSINOPHIL NFR BLD AUTO: 0 % — SIGNIFICANT CHANGE UP (ref 0–6)
GLUCOSE SERPL-MCNC: 117 MG/DL — HIGH (ref 70–99)
HCT VFR BLD CALC: 38.1 % — SIGNIFICANT CHANGE UP (ref 34.5–45)
HGB BLD-MCNC: 12.3 G/DL — SIGNIFICANT CHANGE UP (ref 11.5–15.5)
LYMPHOCYTES # BLD AUTO: 0.9 K/UL — LOW (ref 1–3.3)
LYMPHOCYTES # BLD AUTO: 11 % — LOW (ref 13–44)
MAGNESIUM SERPL-MCNC: 2.4 MG/DL — SIGNIFICANT CHANGE UP (ref 1.8–2.4)
MCHC RBC-ENTMCNC: 32.3 GM/DL — SIGNIFICANT CHANGE UP (ref 32–36)
MCHC RBC-ENTMCNC: 32.6 PG — SIGNIFICANT CHANGE UP (ref 27–34)
MCV RBC AUTO: 101 FL — HIGH (ref 80–100)
MONOCYTES # BLD AUTO: 0.8 K/UL — SIGNIFICANT CHANGE UP (ref 0–0.9)
MONOCYTES NFR BLD AUTO: 9.7 % — HIGH (ref 1–9)
NEUTROPHILS # BLD AUTO: 6.1 K/UL — SIGNIFICANT CHANGE UP (ref 1.8–7.4)
NEUTROPHILS NFR BLD AUTO: 78.5 % — HIGH (ref 43–77)
PHOSPHATE SERPL-MCNC: 1.8 MG/DL — LOW (ref 2.5–4.5)
PLATELET # BLD AUTO: 190 K/UL — SIGNIFICANT CHANGE UP (ref 150–400)
POTASSIUM SERPL-MCNC: 4 MMOL/L — SIGNIFICANT CHANGE UP (ref 3.5–5.3)
POTASSIUM SERPL-SCNC: 4 MMOL/L — SIGNIFICANT CHANGE UP (ref 3.5–5.3)
PROT SERPL-MCNC: 5.7 G/DL — LOW (ref 6–8.3)
RBC # BLD: 3.77 M/UL — LOW (ref 3.8–5.2)
RBC # FLD: 12.4 % — SIGNIFICANT CHANGE UP (ref 10.3–14.5)
SODIUM SERPL-SCNC: 144 MMOL/L — SIGNIFICANT CHANGE UP (ref 135–145)
WBC # BLD: 7.8 K/UL — SIGNIFICANT CHANGE UP (ref 3.8–10.5)
WBC # FLD AUTO: 7.8 K/UL — SIGNIFICANT CHANGE UP (ref 3.8–10.5)

## 2017-01-14 PROCEDURE — 99233 SBSQ HOSP IP/OBS HIGH 50: CPT

## 2017-01-14 RX ORDER — METOPROLOL TARTRATE 50 MG
25 TABLET ORAL ONCE
Qty: 0 | Refills: 0 | Status: COMPLETED | OUTPATIENT
Start: 2017-01-14 | End: 2017-01-14

## 2017-01-14 RX ORDER — SODIUM,POTASSIUM PHOSPHATES 278-250MG
1 POWDER IN PACKET (EA) ORAL
Qty: 0 | Refills: 0 | Status: DISCONTINUED | OUTPATIENT
Start: 2017-01-14 | End: 2017-01-19

## 2017-01-14 RX ADMIN — Medication 1 PATCH: at 11:30

## 2017-01-14 RX ADMIN — ALBUTEROL 2.5 MILLIGRAM(S): 90 AEROSOL, METERED ORAL at 01:42

## 2017-01-14 RX ADMIN — ENOXAPARIN SODIUM 80 MILLIGRAM(S): 100 INJECTION SUBCUTANEOUS at 18:48

## 2017-01-14 RX ADMIN — Medication 0.5 MILLIGRAM(S): at 16:14

## 2017-01-14 RX ADMIN — Medication 1 TABLET(S): at 11:12

## 2017-01-14 RX ADMIN — TIOTROPIUM BROMIDE 1 CAPSULE(S): 18 CAPSULE ORAL; RESPIRATORY (INHALATION) at 05:31

## 2017-01-14 RX ADMIN — Medication 75 MILLIGRAM(S): at 05:30

## 2017-01-14 RX ADMIN — ALBUTEROL 2.5 MILLIGRAM(S): 90 AEROSOL, METERED ORAL at 13:33

## 2017-01-14 RX ADMIN — CITALOPRAM 10 MILLIGRAM(S): 10 TABLET, FILM COATED ORAL at 11:13

## 2017-01-14 RX ADMIN — Medication 1 PATCH: at 11:12

## 2017-01-14 RX ADMIN — Medication 25 MILLIGRAM(S): at 15:43

## 2017-01-14 RX ADMIN — ENOXAPARIN SODIUM 80 MILLIGRAM(S): 100 INJECTION SUBCUTANEOUS at 05:30

## 2017-01-14 RX ADMIN — AZITHROMYCIN 255 MILLIGRAM(S): 500 TABLET, FILM COATED ORAL at 18:48

## 2017-01-14 RX ADMIN — Medication 50 MILLIGRAM(S): at 05:30

## 2017-01-14 RX ADMIN — Medication 1 TABLET(S): at 14:18

## 2017-01-14 RX ADMIN — Medication 40 MILLIGRAM(S): at 05:31

## 2017-01-14 RX ADMIN — Medication 75 MILLIGRAM(S): at 18:54

## 2017-01-14 RX ADMIN — Medication 0.12 MILLIGRAM(S): at 05:30

## 2017-01-14 RX ADMIN — FLUTICASONE PROPIONATE AND SALMETEROL 1 DOSE(S): 50; 250 POWDER ORAL; RESPIRATORY (INHALATION) at 05:31

## 2017-01-14 RX ADMIN — ALBUTEROL 2.5 MILLIGRAM(S): 90 AEROSOL, METERED ORAL at 19:37

## 2017-01-14 RX ADMIN — Medication 1 TABLET(S): at 22:31

## 2017-01-14 RX ADMIN — Medication 25 MILLIGRAM(S): at 18:48

## 2017-01-14 RX ADMIN — ALBUTEROL 2.5 MILLIGRAM(S): 90 AEROSOL, METERED ORAL at 08:11

## 2017-01-14 RX ADMIN — Medication 1 TABLET(S): at 18:54

## 2017-01-14 RX ADMIN — Medication 0.5 MILLIGRAM(S): at 22:34

## 2017-01-14 RX ADMIN — FLUTICASONE PROPIONATE AND SALMETEROL 1 DOSE(S): 50; 250 POWDER ORAL; RESPIRATORY (INHALATION) at 18:49

## 2017-01-15 LAB
ANION GAP SERPL CALC-SCNC: 5 MMOL/L — SIGNIFICANT CHANGE UP (ref 5–17)
BUN SERPL-MCNC: 14 MG/DL — SIGNIFICANT CHANGE UP (ref 7–23)
CALCIUM SERPL-MCNC: 7.8 MG/DL — LOW (ref 8.5–10.1)
CHLORIDE SERPL-SCNC: 106 MMOL/L — SIGNIFICANT CHANGE UP (ref 96–108)
CO2 SERPL-SCNC: 34 MMOL/L — HIGH (ref 22–31)
CREAT SERPL-MCNC: 0.53 MG/DL — SIGNIFICANT CHANGE UP (ref 0.5–1.3)
GLUCOSE SERPL-MCNC: 93 MG/DL — SIGNIFICANT CHANGE UP (ref 70–99)
HCT VFR BLD CALC: 37.4 % — SIGNIFICANT CHANGE UP (ref 34.5–45)
HGB BLD-MCNC: 11.9 G/DL — SIGNIFICANT CHANGE UP (ref 11.5–15.5)
INR BLD: 1.05 RATIO — SIGNIFICANT CHANGE UP (ref 0.88–1.16)
MCHC RBC-ENTMCNC: 31.8 GM/DL — LOW (ref 32–36)
MCHC RBC-ENTMCNC: 32.3 PG — SIGNIFICANT CHANGE UP (ref 27–34)
MCV RBC AUTO: 101.5 FL — HIGH (ref 80–100)
PLATELET # BLD AUTO: 189 K/UL — SIGNIFICANT CHANGE UP (ref 150–400)
POTASSIUM SERPL-MCNC: 3.4 MMOL/L — LOW (ref 3.5–5.3)
POTASSIUM SERPL-SCNC: 3.4 MMOL/L — LOW (ref 3.5–5.3)
PROTHROM AB SERPL-ACNC: 11.7 SEC — SIGNIFICANT CHANGE UP (ref 10–13.1)
RBC # BLD: 3.68 M/UL — LOW (ref 3.8–5.2)
RBC # FLD: 12.5 % — SIGNIFICANT CHANGE UP (ref 10.3–14.5)
SODIUM SERPL-SCNC: 145 MMOL/L — SIGNIFICANT CHANGE UP (ref 135–145)
WBC # BLD: 8.4 K/UL — SIGNIFICANT CHANGE UP (ref 3.8–10.5)
WBC # FLD AUTO: 8.4 K/UL — SIGNIFICANT CHANGE UP (ref 3.8–10.5)

## 2017-01-15 PROCEDURE — 99233 SBSQ HOSP IP/OBS HIGH 50: CPT

## 2017-01-15 RX ORDER — POTASSIUM CHLORIDE 20 MEQ
40 PACKET (EA) ORAL ONCE
Qty: 0 | Refills: 0 | Status: COMPLETED | OUTPATIENT
Start: 2017-01-15 | End: 2017-01-15

## 2017-01-15 RX ORDER — METOPROLOL TARTRATE 50 MG
25 TABLET ORAL ONCE
Qty: 0 | Refills: 0 | Status: COMPLETED | OUTPATIENT
Start: 2017-01-15 | End: 2017-01-15

## 2017-01-15 RX ORDER — RIVAROXABAN 15 MG-20MG
20 KIT ORAL DAILY
Qty: 0 | Refills: 0 | Status: DISCONTINUED | OUTPATIENT
Start: 2017-01-15 | End: 2017-01-19

## 2017-01-15 RX ADMIN — Medication 1 TABLET(S): at 08:42

## 2017-01-15 RX ADMIN — RIVAROXABAN 20 MILLIGRAM(S): KIT at 12:33

## 2017-01-15 RX ADMIN — FLUTICASONE PROPIONATE AND SALMETEROL 1 DOSE(S): 50; 250 POWDER ORAL; RESPIRATORY (INHALATION) at 07:01

## 2017-01-15 RX ADMIN — Medication 1 TABLET(S): at 18:48

## 2017-01-15 RX ADMIN — ALBUTEROL 2.5 MILLIGRAM(S): 90 AEROSOL, METERED ORAL at 07:59

## 2017-01-15 RX ADMIN — ENOXAPARIN SODIUM 80 MILLIGRAM(S): 100 INJECTION SUBCUTANEOUS at 07:01

## 2017-01-15 RX ADMIN — FLUTICASONE PROPIONATE AND SALMETEROL 1 DOSE(S): 50; 250 POWDER ORAL; RESPIRATORY (INHALATION) at 18:48

## 2017-01-15 RX ADMIN — CITALOPRAM 10 MILLIGRAM(S): 10 TABLET, FILM COATED ORAL at 12:33

## 2017-01-15 RX ADMIN — Medication 75 MILLIGRAM(S): at 18:48

## 2017-01-15 RX ADMIN — ALBUTEROL 2.5 MILLIGRAM(S): 90 AEROSOL, METERED ORAL at 13:35

## 2017-01-15 RX ADMIN — Medication 0.5 MILLIGRAM(S): at 10:56

## 2017-01-15 RX ADMIN — Medication 20 MILLIGRAM(S): at 07:01

## 2017-01-15 RX ADMIN — ALBUTEROL 2.5 MILLIGRAM(S): 90 AEROSOL, METERED ORAL at 20:21

## 2017-01-15 RX ADMIN — ALBUTEROL 2.5 MILLIGRAM(S): 90 AEROSOL, METERED ORAL at 01:50

## 2017-01-15 RX ADMIN — Medication 50 MILLIGRAM(S): at 18:48

## 2017-01-15 RX ADMIN — Medication 75 MILLIGRAM(S): at 07:00

## 2017-01-15 RX ADMIN — Medication 50 MILLIGRAM(S): at 07:01

## 2017-01-15 RX ADMIN — Medication 1 PATCH: at 12:33

## 2017-01-15 RX ADMIN — Medication 0.5 MILLIGRAM(S): at 18:59

## 2017-01-15 RX ADMIN — Medication 1 TABLET(S): at 22:39

## 2017-01-15 RX ADMIN — Medication 1 TABLET(S): at 12:33

## 2017-01-15 RX ADMIN — AZITHROMYCIN 255 MILLIGRAM(S): 500 TABLET, FILM COATED ORAL at 18:48

## 2017-01-15 RX ADMIN — TIOTROPIUM BROMIDE 1 CAPSULE(S): 18 CAPSULE ORAL; RESPIRATORY (INHALATION) at 07:02

## 2017-01-15 RX ADMIN — Medication 0.12 MILLIGRAM(S): at 07:01

## 2017-01-15 RX ADMIN — Medication 25 MILLIGRAM(S): at 15:06

## 2017-01-15 RX ADMIN — Medication 40 MILLIEQUIVALENT(S): at 10:56

## 2017-01-15 NOTE — PHYSICAL THERAPY INITIAL EVALUATION ADULT - PERTINENT HX OF CURRENT PROBLEM, REHAB EVAL
71 y.o F w/PMHx of Afib, anxiety, COPD, HTN, depression, Nicotine dependence was brought in by EMS for Fatigue, palpitation, SOB, and weakness. Per pt's , pt had nonproductive cough, nausea, decrease appetite for 5 days.

## 2017-01-16 LAB
ANION GAP SERPL CALC-SCNC: 4 MMOL/L — LOW (ref 5–17)
BUN SERPL-MCNC: 12 MG/DL — SIGNIFICANT CHANGE UP (ref 7–23)
CALCIUM SERPL-MCNC: 7.9 MG/DL — LOW (ref 8.5–10.1)
CHLORIDE SERPL-SCNC: 104 MMOL/L — SIGNIFICANT CHANGE UP (ref 96–108)
CO2 SERPL-SCNC: 36 MMOL/L — HIGH (ref 22–31)
CREAT SERPL-MCNC: 0.5 MG/DL — SIGNIFICANT CHANGE UP (ref 0.5–1.3)
GLUCOSE SERPL-MCNC: 107 MG/DL — HIGH (ref 70–99)
HCT VFR BLD CALC: 40.6 % — SIGNIFICANT CHANGE UP (ref 34.5–45)
HGB BLD-MCNC: 12.8 G/DL — SIGNIFICANT CHANGE UP (ref 11.5–15.5)
MCHC RBC-ENTMCNC: 31.6 GM/DL — LOW (ref 32–36)
MCHC RBC-ENTMCNC: 32.5 PG — SIGNIFICANT CHANGE UP (ref 27–34)
MCV RBC AUTO: 102.8 FL — HIGH (ref 80–100)
PLATELET # BLD AUTO: 196 K/UL — SIGNIFICANT CHANGE UP (ref 150–400)
POTASSIUM SERPL-MCNC: 4.3 MMOL/L — SIGNIFICANT CHANGE UP (ref 3.5–5.3)
POTASSIUM SERPL-SCNC: 4.3 MMOL/L — SIGNIFICANT CHANGE UP (ref 3.5–5.3)
RBC # BLD: 3.95 M/UL — SIGNIFICANT CHANGE UP (ref 3.8–5.2)
RBC # FLD: 12.7 % — SIGNIFICANT CHANGE UP (ref 10.3–14.5)
SODIUM SERPL-SCNC: 144 MMOL/L — SIGNIFICANT CHANGE UP (ref 135–145)
WBC # BLD: 9.9 K/UL — SIGNIFICANT CHANGE UP (ref 3.8–10.5)
WBC # FLD AUTO: 9.9 K/UL — SIGNIFICANT CHANGE UP (ref 3.8–10.5)

## 2017-01-16 PROCEDURE — 99233 SBSQ HOSP IP/OBS HIGH 50: CPT

## 2017-01-16 PROCEDURE — 93010 ELECTROCARDIOGRAM REPORT: CPT

## 2017-01-16 RX ORDER — POTASSIUM CHLORIDE 20 MEQ
40 PACKET (EA) ORAL DAILY
Qty: 0 | Refills: 0 | Status: DISCONTINUED | OUTPATIENT
Start: 2017-01-16 | End: 2017-01-16

## 2017-01-16 RX ORDER — METOPROLOL TARTRATE 50 MG
50 TABLET ORAL THREE TIMES A DAY
Qty: 0 | Refills: 0 | Status: DISCONTINUED | OUTPATIENT
Start: 2017-01-16 | End: 2017-01-19

## 2017-01-16 RX ORDER — METOPROLOL TARTRATE 50 MG
5 TABLET ORAL ONCE
Qty: 0 | Refills: 0 | Status: COMPLETED | OUTPATIENT
Start: 2017-01-16 | End: 2017-01-16

## 2017-01-16 RX ADMIN — FLUTICASONE PROPIONATE AND SALMETEROL 1 DOSE(S): 50; 250 POWDER ORAL; RESPIRATORY (INHALATION) at 07:07

## 2017-01-16 RX ADMIN — Medication 5 MILLIGRAM(S): at 21:01

## 2017-01-16 RX ADMIN — Medication 0.5 MILLIGRAM(S): at 06:08

## 2017-01-16 RX ADMIN — Medication 75 MILLIGRAM(S): at 06:08

## 2017-01-16 RX ADMIN — FLUTICASONE PROPIONATE AND SALMETEROL 1 DOSE(S): 50; 250 POWDER ORAL; RESPIRATORY (INHALATION) at 19:03

## 2017-01-16 RX ADMIN — Medication 0.5 MILLIGRAM(S): at 19:02

## 2017-01-16 RX ADMIN — Medication 50 MILLIGRAM(S): at 21:06

## 2017-01-16 RX ADMIN — CITALOPRAM 10 MILLIGRAM(S): 10 TABLET, FILM COATED ORAL at 13:00

## 2017-01-16 RX ADMIN — Medication 1 PATCH: at 12:50

## 2017-01-16 RX ADMIN — Medication 1 TABLET(S): at 19:03

## 2017-01-16 RX ADMIN — Medication 1 TABLET(S): at 08:53

## 2017-01-16 RX ADMIN — Medication 50 MILLIGRAM(S): at 06:08

## 2017-01-16 RX ADMIN — RIVAROXABAN 20 MILLIGRAM(S): KIT at 13:00

## 2017-01-16 RX ADMIN — ALBUTEROL 2.5 MILLIGRAM(S): 90 AEROSOL, METERED ORAL at 00:48

## 2017-01-16 RX ADMIN — TIOTROPIUM BROMIDE 1 CAPSULE(S): 18 CAPSULE ORAL; RESPIRATORY (INHALATION) at 07:07

## 2017-01-16 RX ADMIN — Medication 1 TABLET(S): at 13:00

## 2017-01-16 RX ADMIN — Medication 1 PATCH: at 13:00

## 2017-01-16 RX ADMIN — Medication 0.12 MILLIGRAM(S): at 06:08

## 2017-01-16 RX ADMIN — AZITHROMYCIN 255 MILLIGRAM(S): 500 TABLET, FILM COATED ORAL at 19:03

## 2017-01-16 RX ADMIN — ALBUTEROL 2.5 MILLIGRAM(S): 90 AEROSOL, METERED ORAL at 13:50

## 2017-01-16 RX ADMIN — Medication 1 TABLET(S): at 21:06

## 2017-01-16 RX ADMIN — ALBUTEROL 2.5 MILLIGRAM(S): 90 AEROSOL, METERED ORAL at 08:03

## 2017-01-16 RX ADMIN — Medication 20 MILLIGRAM(S): at 06:08

## 2017-01-17 ENCOUNTER — TRANSCRIPTION ENCOUNTER (OUTPATIENT)
Age: 72
End: 2017-01-17

## 2017-01-17 LAB
ANION GAP SERPL CALC-SCNC: 4 MMOL/L — LOW (ref 5–17)
BASE EXCESS BLDA CALC-SCNC: 12.3 MMOL/L — HIGH (ref -2–2)
BLOOD GAS COMMENTS ARTERIAL: SIGNIFICANT CHANGE UP
BLOOD GAS COMMENTS ARTERIAL: SIGNIFICANT CHANGE UP
BUN SERPL-MCNC: 11 MG/DL — SIGNIFICANT CHANGE UP (ref 7–23)
CALCIUM SERPL-MCNC: 8.4 MG/DL — LOW (ref 8.5–10.1)
CHLORIDE SERPL-SCNC: 101 MMOL/L — SIGNIFICANT CHANGE UP (ref 96–108)
CO2 SERPL-SCNC: 39 MMOL/L — HIGH (ref 22–31)
CREAT SERPL-MCNC: 0.53 MG/DL — SIGNIFICANT CHANGE UP (ref 0.5–1.3)
CULTURE RESULTS: SIGNIFICANT CHANGE UP
CULTURE RESULTS: SIGNIFICANT CHANGE UP
FOLATE SERPL-MCNC: 16.9 NG/ML — SIGNIFICANT CHANGE UP (ref 4.8–24.2)
GLUCOSE SERPL-MCNC: 110 MG/DL — HIGH (ref 70–99)
HCO3 BLDA-SCNC: 34 MMOL/L — HIGH (ref 23–27)
HCT VFR BLD CALC: 41.7 % — SIGNIFICANT CHANGE UP (ref 34.5–45)
HGB BLD-MCNC: 13.3 G/DL — SIGNIFICANT CHANGE UP (ref 11.5–15.5)
HOROWITZ INDEX BLDA+IHG-RTO: 21 — SIGNIFICANT CHANGE UP
MCHC RBC-ENTMCNC: 32 GM/DL — SIGNIFICANT CHANGE UP (ref 32–36)
MCHC RBC-ENTMCNC: 32.4 PG — SIGNIFICANT CHANGE UP (ref 27–34)
MCV RBC AUTO: 101.2 FL — HIGH (ref 80–100)
PCO2 BLDA: 58 MMHG — HIGH (ref 32–46)
PH BLDA: 7.43 — SIGNIFICANT CHANGE UP (ref 7.35–7.45)
PLATELET # BLD AUTO: 229 K/UL — SIGNIFICANT CHANGE UP (ref 150–400)
PO2 BLDA: 44 MMHG — CRITICAL LOW (ref 74–108)
POTASSIUM SERPL-MCNC: 4.7 MMOL/L — SIGNIFICANT CHANGE UP (ref 3.5–5.3)
POTASSIUM SERPL-SCNC: 4.7 MMOL/L — SIGNIFICANT CHANGE UP (ref 3.5–5.3)
RBC # BLD: 4.12 M/UL — SIGNIFICANT CHANGE UP (ref 3.8–5.2)
RBC # FLD: 12.4 % — SIGNIFICANT CHANGE UP (ref 10.3–14.5)
SAO2 % BLDA: 82 % — LOW (ref 92–96)
SODIUM SERPL-SCNC: 144 MMOL/L — SIGNIFICANT CHANGE UP (ref 135–145)
SPECIMEN SOURCE: SIGNIFICANT CHANGE UP
SPECIMEN SOURCE: SIGNIFICANT CHANGE UP
VIT B12 SERPL-MCNC: 606 PG/ML — SIGNIFICANT CHANGE UP (ref 243–894)
WBC # BLD: 11.8 K/UL — HIGH (ref 3.8–10.5)
WBC # FLD AUTO: 11.8 K/UL — HIGH (ref 3.8–10.5)

## 2017-01-17 PROCEDURE — 99232 SBSQ HOSP IP/OBS MODERATE 35: CPT

## 2017-01-17 PROCEDURE — 99231 SBSQ HOSP IP/OBS SF/LOW 25: CPT

## 2017-01-17 RX ORDER — FAMOTIDINE 10 MG/ML
1 INJECTION INTRAVENOUS
Qty: 0 | Refills: 0 | COMMUNITY

## 2017-01-17 RX ORDER — SODIUM,POTASSIUM PHOSPHATES 278-250MG
1 POWDER IN PACKET (EA) ORAL
Qty: 0 | Refills: 0 | COMMUNITY
Start: 2017-01-17

## 2017-01-17 RX ORDER — ESCITALOPRAM OXALATE 10 MG/1
1 TABLET, FILM COATED ORAL
Qty: 0 | Refills: 0 | COMMUNITY
Start: 2017-01-17

## 2017-01-17 RX ORDER — ACETAMINOPHEN 500 MG
650 TABLET ORAL EVERY 6 HOURS
Qty: 0 | Refills: 0 | Status: DISCONTINUED | OUTPATIENT
Start: 2017-01-17 | End: 2017-01-19

## 2017-01-17 RX ORDER — METOPROLOL TARTRATE 50 MG
5 TABLET ORAL ONCE
Qty: 0 | Refills: 0 | Status: COMPLETED | OUTPATIENT
Start: 2017-01-17 | End: 2017-01-17

## 2017-01-17 RX ORDER — FLUTICASONE PROPIONATE AND SALMETEROL 50; 250 UG/1; UG/1
0 POWDER ORAL; RESPIRATORY (INHALATION)
Qty: 0 | Refills: 0 | COMMUNITY
Start: 2017-01-17

## 2017-01-17 RX ORDER — MIRTAZAPINE 45 MG/1
1 TABLET, ORALLY DISINTEGRATING ORAL
Qty: 0 | Refills: 0 | COMMUNITY
Start: 2017-01-17

## 2017-01-17 RX ORDER — METOPROLOL TARTRATE 50 MG
1 TABLET ORAL
Qty: 0 | Refills: 0 | COMMUNITY
Start: 2017-01-17

## 2017-01-17 RX ORDER — MIRTAZAPINE 45 MG/1
7.5 TABLET, ORALLY DISINTEGRATING ORAL AT BEDTIME
Qty: 0 | Refills: 0 | Status: DISCONTINUED | OUTPATIENT
Start: 2017-01-17 | End: 2017-01-19

## 2017-01-17 RX ORDER — DIGOXIN 250 MCG
1 TABLET ORAL
Qty: 0 | Refills: 0 | COMMUNITY
Start: 2017-01-17

## 2017-01-17 RX ORDER — NICOTINE POLACRILEX 2 MG
0 GUM BUCCAL
Qty: 0 | Refills: 0 | COMMUNITY
Start: 2017-01-17

## 2017-01-17 RX ORDER — ALBUTEROL 90 UG/1
2.5 AEROSOL, METERED ORAL EVERY 6 HOURS
Qty: 0 | Refills: 0 | Status: DISCONTINUED | OUTPATIENT
Start: 2017-01-17 | End: 2017-01-19

## 2017-01-17 RX ORDER — TIOTROPIUM BROMIDE 18 UG/1
1 CAPSULE ORAL; RESPIRATORY (INHALATION)
Qty: 0 | Refills: 0 | COMMUNITY
Start: 2017-01-17

## 2017-01-17 RX ORDER — BISOPROLOL FUMARATE 10 MG/1
1 TABLET, FILM COATED ORAL
Qty: 0 | Refills: 0 | COMMUNITY

## 2017-01-17 RX ORDER — ALBUTEROL 90 UG/1
2.5 AEROSOL, METERED ORAL
Qty: 0 | Refills: 0 | COMMUNITY
Start: 2017-01-17

## 2017-01-17 RX ORDER — ESCITALOPRAM OXALATE 10 MG/1
20 TABLET, FILM COATED ORAL DAILY
Qty: 0 | Refills: 0 | Status: DISCONTINUED | OUTPATIENT
Start: 2017-01-17 | End: 2017-01-19

## 2017-01-17 RX ORDER — CITALOPRAM 10 MG/1
1 TABLET, FILM COATED ORAL
Qty: 0 | Refills: 0 | COMMUNITY

## 2017-01-17 RX ADMIN — Medication 5 MILLIGRAM(S): at 03:59

## 2017-01-17 RX ADMIN — RIVAROXABAN 20 MILLIGRAM(S): KIT at 18:23

## 2017-01-17 RX ADMIN — Medication 0.12 MILLIGRAM(S): at 05:19

## 2017-01-17 RX ADMIN — Medication 50 MILLIGRAM(S): at 22:43

## 2017-01-17 RX ADMIN — FLUTICASONE PROPIONATE AND SALMETEROL 1 DOSE(S): 50; 250 POWDER ORAL; RESPIRATORY (INHALATION) at 05:21

## 2017-01-17 RX ADMIN — Medication 1 TABLET(S): at 08:35

## 2017-01-17 RX ADMIN — Medication 1 PATCH: at 12:30

## 2017-01-17 RX ADMIN — Medication 1 TABLET(S): at 12:45

## 2017-01-17 RX ADMIN — Medication 0.5 MILLIGRAM(S): at 19:34

## 2017-01-17 RX ADMIN — MIRTAZAPINE 7.5 MILLIGRAM(S): 45 TABLET, ORALLY DISINTEGRATING ORAL at 22:43

## 2017-01-17 RX ADMIN — TIOTROPIUM BROMIDE 1 CAPSULE(S): 18 CAPSULE ORAL; RESPIRATORY (INHALATION) at 05:21

## 2017-01-17 RX ADMIN — Medication 1 TABLET(S): at 18:23

## 2017-01-17 RX ADMIN — Medication 1 PATCH: at 12:45

## 2017-01-17 RX ADMIN — FLUTICASONE PROPIONATE AND SALMETEROL 1 DOSE(S): 50; 250 POWDER ORAL; RESPIRATORY (INHALATION) at 19:35

## 2017-01-17 RX ADMIN — Medication 50 MILLIGRAM(S): at 05:20

## 2017-01-17 RX ADMIN — Medication 20 MILLIGRAM(S): at 05:19

## 2017-01-17 RX ADMIN — Medication 0.5 MILLIGRAM(S): at 09:28

## 2017-01-17 RX ADMIN — Medication 1 TABLET(S): at 22:43

## 2017-01-17 RX ADMIN — ESCITALOPRAM OXALATE 20 MILLIGRAM(S): 10 TABLET, FILM COATED ORAL at 12:45

## 2017-01-17 RX ADMIN — Medication 50 MILLIGRAM(S): at 14:28

## 2017-01-17 NOTE — DISCHARGE NOTE ADULT - HOSPITAL COURSE
71 y.o F w/PMHx of Afib, anxiety, COPD, HTN, depression, Nicotine dependence was brought in by EMS for Fatigue, palpitation, SOB, and weakness.  She was admitted to the ICU for septic shock and hypercarbic respiratory failure secondary to pneumonia.    In ED, Pt was tachycardia at 100s, hypotensive  at 80/54. leukocytosis at 11.1, procalcitonin at elevated at 0.18, BNP elevated at 8880, trop at 0.136 . ABG showed respiratory acidosis. CXR showed hyperinflated lung. Influenza AH3 +  Pt was put on Bipap for respiratory distress and started on levophed drip. Patient had CTA chest- showing no acute PE, but showed gas in portal vein.  CT abd pelvis done showing no free air.    ************************** 71 y.o F w/PMHx of Afib, anxiety, COPD, HTN, depression, Nicotine dependence was brought in by EMS for Fatigue, palpitation, SOB, and weakness.  She was admitted to the ICU for septic shock and hypercarbic respiratory failure secondary to pneumonia.    In ED, Pt was tachycardia at 100s, hypotensive  at 80/54. leukocytosis at 11.1, procalcitonin at elevated at 0.18, BNP elevated at 8880, trop at 0.136 . ABG showed respiratory acidosis. CXR showed hyperinflated lung. Influenza AH3 +  Pt was put on Bipap for respiratory distress and started on levophed drip. Patient had CTA chest- showing no acute PE, but showed gas in portal vein.  CT abd pelvis done showing no free air.  Course was complicated with elevated troponin secondary to demand ischemia, not ACS.  Pt was seen by Cardiology, Dr. Rachel Foreman.  She was started on ASA.    Pt was treated with Tamiflu and Azithromycin for AH3 and COPD exacerbation.  She was eventually weened off of Bipap to NC.  Pt was seen by Psychiatry, Dr. Trejo for depression.  She was started on Lexapro and Remeron.    Deemed appropriate for DC to [facility] 71 y.o F w/PMHx of Afib, anxiety, COPD, HTN, depression, Nicotine dependence was brought in by EMS for Fatigue, palpitation, SOB, and weakness.  She was admitted to the ICU for septic shock and hypercarbic respiratory failure secondary to pneumonia.    In ED, Pt was tachycardia at 100s, hypotensive  at 80/54. leukocytosis at 11.1, procalcitonin at elevated at 0.18, BNP elevated at 8880, trop at 0.136 . ABG showed respiratory acidosis. CXR showed hyperinflated lung. Influenza AH3 +  Pt was put on Bipap for respiratory distress and started on levophed drip. Patient had CTA chest- showing no acute PE, but showed gas in portal vein.  CT abd pelvis done showing no free air.  Course was complicated with elevated troponin secondary to demand ischemia, not ACS.  Pt was seen by Cardiology, Dr. Rachel Foreman.  She was started on ASA.    Pt was treated with Tamiflu and Azithromycin for AH3 and COPD exacerbation.  She was eventually weened off of Bipap to NC.  Pt was seen by Psychiatry, Dr. Trejo for depression.  She was started on Lexapro and Remeron.  Pt had asymptomatic decrease in oxygen saturation after coming off bipap, was repositioned and oxygen saturation came back to normal.   Deemed appropriate for DC to [facility] 71 y.o F w/PMHx of Afib, anxiety, COPD, HTN, depression, Nicotine dependence was brought in by EMS for Fatigue, palpitation, SOB, and weakness.  She was admitted to the ICU for Ac Hypoxic ,hypercapnic respiratory failure,Bi PAP, Rapid A Fib with hypotension & shock and hypercarbic respiratory failure secondary to COPD Exacerbation & FluAH3 +. Pt had CT Angio,NO PE, NO PNA Pt was on Full dose AC.Pt was seen by pulmonary Dr Link ,on IV Azithromycin for COPD Exacerbation.Pt on IV Fluids.  In ED, Pt was tachycardia at 100s, hypotensive  at 80/54,pt was on vasopressor ~24 hrs,. leukocytosis at 11.1, procalcitonin at elevated at 0.18, BNP elevated at 8880, trop at 0.136  2 to demand ischemia ,NO ACS as per Dr Ramos's group.. ABG showed respiratory acidosis. CXR showed hyperinflated lung. Influenza AH3 +  Pt was put on Bipap for respiratory distress and started on levophed drip. Patient had CTA chest- showing no acute PE, but showed gas in portal vein.  CT abd pelvis done showing no free air.Pt was seen by GI Dr Remy.ID Dr Carlton NO sepsis ,Rx COPD Exacerbation.  Course was complicated with elevated troponin secondary to demand ischemia, not ACS.  Pt was seen by Cardiology, Dr. Ramos Group.  She was started on ASA.ECHO done.    Pt was treated with Tamiflu and Azithromycin for AH3 and COPD exacerbation.  She was eventually weened off of Bipap to NC 5 lit NC.  Pt was seen by Psychiatry, Dr. Trejo for depression.  She was started on Lexapro and Remeron.Dr Ramos restarted on home Rx Xarelto for A Fib. Cardio added Cardizem 60 mg 4x day ,Lopressor 50 mg 3x day for rapid A fib.  Pt had asymptomatic decrease in oxygen saturation after coming off bipap, 2 oropharyngeal secretions &   PVC on CXR, pt got IV Lasix, Nebs & was repositioned and oxygen saturation came back to normal. Labs Normal ,Blood c/s negative. Pt MUST STOP Smoking on  Nicotine patch.Pt MUST do Incentive Spirometry.  Deemed appropriate for DC to [facility] as per Pulmonary & cardiology,Case D/W Pt & Family at detail.

## 2017-01-17 NOTE — DISCHARGE NOTE ADULT - MEDICATION SUMMARY - MEDICATIONS TO STOP TAKING
I will STOP taking the medications listed below when I get home from the hospital:    citalopram  --  by mouth    bisoprolol  --  by mouth    bisoprolol 10 mg oral tablet  -- 1 tab(s) by mouth 2 times a day    citalopram 10 mg oral tablet  -- 1 tab(s) by mouth once a day I will STOP taking the medications listed below when I get home from the hospital:    citalopram  --  by mouth    bisoprolol  --  by mouth    valsartan  --  by mouth    bisoprolol 10 mg oral tablet  -- 1 tab(s) by mouth 2 times a day    citalopram 10 mg oral tablet  -- 1 tab(s) by mouth once a day    valsartan 160 mg oral tablet  -- 1 tab(s) by mouth once a day

## 2017-01-17 NOTE — DISCHARGE NOTE ADULT - PATIENT PORTAL LINK FT
“You can access the Follow[employer] Patient Portal, offered by [facility], by registering with the following website: [email or web address]”

## 2017-01-17 NOTE — DISCHARGE NOTE ADULT - CARE PROVIDERS DIRECT ADDRESSES
,[email or web address],DirectAddress_Unknown,DirectAddress_Unknown,DirectAddress_Unknown

## 2017-01-17 NOTE — DISCHARGE NOTE ADULT - SECONDARY DIAGNOSIS.
Chronic obstructive pulmonary disease, unspecified COPD type Shock Atrial fibrillation, unspecified type Depression, unspecified depression type Elevated troponin Essential hypertension

## 2017-01-17 NOTE — PROVIDER CONTACT NOTE (CRITICAL VALUE NOTIFICATION) - ACTION/TREATMENT ORDERED:
evaluate
Placed Patient on BIPAP 13 / 6 / 60%
no changes made at this time
no changes on bipap at this time. will monitor
3-4 L N/C. Ordering Home O2

## 2017-01-17 NOTE — DISCHARGE NOTE ADULT - CARE PLAN
Principal Discharge DX:	Respiratory failure with hypercapnia, unspecified chronicity  Goal:	Optimize functionality  Instructions for follow-up, activity and diet:	Pt found to be Flu AH3 +.  Was treated with Tamiflu for 5 days.  Secondary Diagnosis:	Chronic obstructive pulmonary disease, unspecified COPD type  Instructions for follow-up, activity and diet:	Was treated with Azithromycin for 5 days.  Was also on BiPAP.  Currently on Prednisone 20 Qd, to be tapered as instructed above.  Continue Advair, Spiriva, and albuterol.  O2 NC at rehab.  Secondary Diagnosis:	Shock  Instructions for follow-up, activity and diet:	Resolved.  Secondary Diagnosis:	Atrial fibrillation, unspecified type  Instructions for follow-up, activity and diet:	Continue Xarelto, metoprolol, and Digoxin.  F/U with Cardiology as an outpatient.  Secondary Diagnosis:	Depression, unspecified depression type  Instructions for follow-up, activity and diet:	continue Remeron and Lexapro.  Will need outpatient psych follow-up.  Secondary Diagnosis:	Elevated troponin  Instructions for follow-up, activity and diet:	Secondary to Demand ischemia from rapid afib and COPD.  No ACS.  Secondary Diagnosis:	Essential hypertension  Instructions for follow-up, activity and diet:	Continue Metoprolol, Valsartan. Principal Discharge DX:	Respiratory failure with hypercapnia, unspecified chronicity  Goal:	Optimize functionality  Instructions for follow-up, activity and diet:	S/P Ac Hypoxic ,hypercapnic respiratory failure,S/P Bi PAP,now on NC O2 5 lit   Pt found to be Flu AH3 +.  Was treated with Tamiflu for 5 days.  O2 NC -5 lit, INH ,Nebs,Rx COPD  Secondary Diagnosis:	Chronic obstructive pulmonary disease, unspecified COPD type  Instructions for follow-up, activity and diet:	Was treated with Azithromycin for 5 days.  Currently on Prednisone 20 Qd, to be tapered as instructed above.  Continue Advair, Spiriva, and Duonebs 4x day & PRN .  O2 NC at rehab.Incentive spirometry 10 q hrly while awake  keep HOB 45 degree All the time ,Incentive spirometry  Follow up with Pulmonary MD on D/c  STOP Smoking  Secondary Diagnosis:	Shock  Instructions for follow-up, activity and diet:	Resolved.  Secondary Diagnosis:	Atrial fibrillation, unspecified type  Instructions for follow-up, activity and diet:	Continue Xarelto, metoprolol 50 mg TID.Cardizem  1 tab daily from tomorrow, and Digoxin.  F/U with Cardiology as an outpatient. Dig level in 3 days  Secondary Diagnosis:	Depression, unspecified depression type  Instructions for follow-up, activity and diet:	continue Remeron and Lexapro By Psych.  Will need outpatient psych follow-up.  Secondary Diagnosis:	Elevated troponin  Instructions for follow-up, activity and diet:	Secondary to Demand ischemia from rapid afib and COPD.  No ACS.follow up with Cardiology Dr Yao upon d/c from [facility]  Secondary Diagnosis:	Essential hypertension  Instructions for follow-up, activity and diet:	Continue Metoprolol,Cardizem CD daily Principal Discharge DX:	Respiratory failure with hypercapnia, unspecified chronicity  Goal:	Optimize functionality  Instructions for follow-up, activity and diet:	S/P Ac Hypoxic ,hypercapnic respiratory failure,S/P Bi PAP,now on NC O2 5 lit   Pt found to be Flu AH3 +.  Was treated with Tamiflu for 5 days.  O2 NC -5 lit, INH ,Nebs,Rx COPD  Secondary Diagnosis:	Chronic obstructive pulmonary disease, unspecified COPD type  Instructions for follow-up, activity and diet:	Was treated with Azithromycin for 5 days.  Currently on Prednisone 20 Qd, to be tapered as instructed above.  Continue Advair, Spiriva, and Duonebs 4x day & PRN .  O2 NC at rehab.Incentive spirometry 10 q hrly while awake  keep HOB 45 degree All the time ,Incentive spirometry  Follow up with Pulmonary MD on D/c  STOP Smoking  Secondary Diagnosis:	Shock  Instructions for follow-up, activity and diet:	Resolved.  Secondary Diagnosis:	Atrial fibrillation, unspecified type  Instructions for follow-up, activity and diet:	Continue Xarelto, metoprolol 50 mg TID.Cardizem  1 tab daily from tomorrow, and Digoxin.  F/U with Cardiology as an outpatient. Dig level in 3 days  Secondary Diagnosis:	Depression, unspecified depression type  Instructions for follow-up, activity and diet:	continue Remeron and Lexapro By Psych.  Will need outpatient psych follow-up.  Secondary Diagnosis:	Elevated troponin  Instructions for follow-up, activity and diet:	Secondary to Demand ischemia from rapid afib and COPD.  No ACS.follow up with Cardiology Dr Yao upon d/c from [facility]  Secondary Diagnosis:	Essential hypertension  Instructions for follow-up, activity and diet:	Continue Metoprolol,Cardizem CD daily

## 2017-01-17 NOTE — DISCHARGE NOTE ADULT - ADDITIONAL INSTRUCTIONS
Advised to stop smoking.  Given Nicotine patch. Advised to stop smoking.  Given Nicotine patch daily.

## 2017-01-17 NOTE — DISCHARGE NOTE ADULT - CARE PROVIDER_API CALL
Holland Yao), Cardiovascular Disease; Internal Medicine  [address] [place] [address]  Phone: [phone]  Fax: [phone]    Jairo Trejo), Psychiatry  [address] [place] [address]  Phone: [phone]  Fax: [phone]    Abran Link), Critical Care Medicine; [facility]; Internal Medicine; Pulmonary Disease  [address] [place] [address]  Phone: [phone]  Fax: [phone]

## 2017-01-17 NOTE — DISCHARGE NOTE ADULT - PLAN OF CARE
Optimize functionality Pt found to be Flu AH3 +.  Was treated with Tamiflu for 5 days. Was treated with Azithromycin for 5 days.  Was also on BiPAP.  Currently on Prednisone 20 Qd, to be tapered as instructed above.  Continue Advair, Spiriva, and albuterol.  O2 NC at rehab. Resolved. Continue Xarelto, metoprolol, and Digoxin.  F/U with Cardiology as an outpatient. continue Remeron and Lexapro.  Will need outpatient psych follow-up. Secondary to Demand ischemia from rapid afib and COPD.  No ACS. Continue Metoprolol, Valsartan. S/P Ac Hypoxic ,hypercapnic respiratory failure,S/P Bi PAP,now on NC O2 5 lit   Pt found to be Flu AH3 +.  Was treated with Tamiflu for 5 days.  O2 NC -5 lit, INH ,Nebs,Rx COPD Was treated with Azithromycin for 5 days.  Currently on Prednisone 20 Qd, to be tapered as instructed above.  Continue Advair, Spiriva, and Duonebs 4x day & PRN .  O2 NC at rehab.Incentive spirometry 10 q hrly while awake  keep HOB 45 degree All the time ,Incentive spirometry  Follow up with Pulmonary MD on D/c  STOP Smoking Continue Xarelto, metoprolol 50 mg TID.Cardizem  1 tab daily from tomorrow, and Digoxin.  F/U with Cardiology as an outpatient. Dig level in 3 days continue Remeron and Lexapro By Psych.  Will need outpatient psych follow-up. Secondary to Demand ischemia from rapid afib and COPD.  No ACS.follow up with Cardiology Dr Yao upon d/c from [facility] Continue Metoprolol,Cardizem CD daily

## 2017-01-18 PROCEDURE — 99233 SBSQ HOSP IP/OBS HIGH 50: CPT

## 2017-01-18 PROCEDURE — 71010: CPT | Mod: 26

## 2017-01-18 RX ORDER — FUROSEMIDE 40 MG
20 TABLET ORAL ONCE
Qty: 0 | Refills: 0 | Status: COMPLETED | OUTPATIENT
Start: 2017-01-18 | End: 2017-01-18

## 2017-01-18 RX ADMIN — Medication 50 MILLIGRAM(S): at 14:18

## 2017-01-18 RX ADMIN — ESCITALOPRAM OXALATE 20 MILLIGRAM(S): 10 TABLET, FILM COATED ORAL at 12:57

## 2017-01-18 RX ADMIN — RIVAROXABAN 20 MILLIGRAM(S): KIT at 17:37

## 2017-01-18 RX ADMIN — ALBUTEROL 2.5 MILLIGRAM(S): 90 AEROSOL, METERED ORAL at 08:10

## 2017-01-18 RX ADMIN — Medication 0.12 MILLIGRAM(S): at 06:37

## 2017-01-18 RX ADMIN — Medication 1 TABLET(S): at 17:37

## 2017-01-18 RX ADMIN — Medication 20 MILLIGRAM(S): at 15:56

## 2017-01-18 RX ADMIN — FLUTICASONE PROPIONATE AND SALMETEROL 1 DOSE(S): 50; 250 POWDER ORAL; RESPIRATORY (INHALATION) at 06:39

## 2017-01-18 RX ADMIN — Medication 50 MILLIGRAM(S): at 06:37

## 2017-01-18 RX ADMIN — Medication 1 PATCH: at 12:57

## 2017-01-18 RX ADMIN — Medication 1 TABLET(S): at 21:03

## 2017-01-18 RX ADMIN — MIRTAZAPINE 7.5 MILLIGRAM(S): 45 TABLET, ORALLY DISINTEGRATING ORAL at 21:03

## 2017-01-18 RX ADMIN — Medication 1 PATCH: at 13:00

## 2017-01-18 RX ADMIN — Medication 50 MILLIGRAM(S): at 21:03

## 2017-01-18 RX ADMIN — FLUTICASONE PROPIONATE AND SALMETEROL 1 DOSE(S): 50; 250 POWDER ORAL; RESPIRATORY (INHALATION) at 19:40

## 2017-01-18 RX ADMIN — Medication 20 MILLIGRAM(S): at 06:37

## 2017-01-18 RX ADMIN — Medication 0.5 MILLIGRAM(S): at 19:40

## 2017-01-18 RX ADMIN — TIOTROPIUM BROMIDE 1 CAPSULE(S): 18 CAPSULE ORAL; RESPIRATORY (INHALATION) at 06:38

## 2017-01-18 RX ADMIN — Medication 0.5 MILLIGRAM(S): at 06:36

## 2017-01-18 RX ADMIN — Medication 1 TABLET(S): at 12:57

## 2017-01-19 VITALS — SYSTOLIC BLOOD PRESSURE: 125 MMHG | DIASTOLIC BLOOD PRESSURE: 80 MMHG | HEART RATE: 76 BPM

## 2017-01-19 LAB
ANION GAP SERPL CALC-SCNC: 4 MMOL/L — LOW (ref 5–17)
BUN SERPL-MCNC: 13 MG/DL — SIGNIFICANT CHANGE UP (ref 7–23)
CALCIUM SERPL-MCNC: 8.6 MG/DL — SIGNIFICANT CHANGE UP (ref 8.5–10.1)
CHLORIDE SERPL-SCNC: 97 MMOL/L — SIGNIFICANT CHANGE UP (ref 96–108)
CO2 SERPL-SCNC: 42 MMOL/L — HIGH (ref 22–31)
CREAT SERPL-MCNC: 0.55 MG/DL — SIGNIFICANT CHANGE UP (ref 0.5–1.3)
GLUCOSE SERPL-MCNC: 97 MG/DL — SIGNIFICANT CHANGE UP (ref 70–99)
HCT VFR BLD CALC: 41.9 % — SIGNIFICANT CHANGE UP (ref 34.5–45)
HGB BLD-MCNC: 13.1 G/DL — SIGNIFICANT CHANGE UP (ref 11.5–15.5)
MCHC RBC-ENTMCNC: 31.4 GM/DL — LOW (ref 32–36)
MCHC RBC-ENTMCNC: 32.8 PG — SIGNIFICANT CHANGE UP (ref 27–34)
MCV RBC AUTO: 104.4 FL — HIGH (ref 80–100)
PLATELET # BLD AUTO: 228 K/UL — SIGNIFICANT CHANGE UP (ref 150–400)
POTASSIUM SERPL-MCNC: 4.6 MMOL/L — SIGNIFICANT CHANGE UP (ref 3.5–5.3)
POTASSIUM SERPL-SCNC: 4.6 MMOL/L — SIGNIFICANT CHANGE UP (ref 3.5–5.3)
RBC # BLD: 4.01 M/UL — SIGNIFICANT CHANGE UP (ref 3.8–5.2)
RBC # FLD: 12.8 % — SIGNIFICANT CHANGE UP (ref 10.3–14.5)
SODIUM SERPL-SCNC: 143 MMOL/L — SIGNIFICANT CHANGE UP (ref 135–145)
WBC # BLD: 10.3 K/UL — SIGNIFICANT CHANGE UP (ref 3.8–10.5)
WBC # FLD AUTO: 10.3 K/UL — SIGNIFICANT CHANGE UP (ref 3.8–10.5)

## 2017-01-19 PROCEDURE — 82746 ASSAY OF FOLIC ACID SERUM: CPT

## 2017-01-19 PROCEDURE — 87086 URINE CULTURE/COLONY COUNT: CPT

## 2017-01-19 PROCEDURE — 80048 BASIC METABOLIC PNL TOTAL CA: CPT

## 2017-01-19 PROCEDURE — 71275 CT ANGIOGRAPHY CHEST: CPT

## 2017-01-19 PROCEDURE — 74177 CT ABD & PELVIS W/CONTRAST: CPT

## 2017-01-19 PROCEDURE — 82550 ASSAY OF CK (CPK): CPT

## 2017-01-19 PROCEDURE — 93306 TTE W/DOPPLER COMPLETE: CPT

## 2017-01-19 PROCEDURE — 36600 WITHDRAWAL OF ARTERIAL BLOOD: CPT

## 2017-01-19 PROCEDURE — 87581 M.PNEUMON DNA AMP PROBE: CPT

## 2017-01-19 PROCEDURE — 97530 THERAPEUTIC ACTIVITIES: CPT

## 2017-01-19 PROCEDURE — 87798 DETECT AGENT NOS DNA AMP: CPT

## 2017-01-19 PROCEDURE — 83880 ASSAY OF NATRIURETIC PEPTIDE: CPT

## 2017-01-19 PROCEDURE — 93005 ELECTROCARDIOGRAM TRACING: CPT

## 2017-01-19 PROCEDURE — 83690 ASSAY OF LIPASE: CPT

## 2017-01-19 PROCEDURE — 71045 X-RAY EXAM CHEST 1 VIEW: CPT

## 2017-01-19 PROCEDURE — 94660 CPAP INITIATION&MGMT: CPT

## 2017-01-19 PROCEDURE — 99231 SBSQ HOSP IP/OBS SF/LOW 25: CPT

## 2017-01-19 PROCEDURE — 87040 BLOOD CULTURE FOR BACTERIA: CPT

## 2017-01-19 PROCEDURE — 83735 ASSAY OF MAGNESIUM: CPT

## 2017-01-19 PROCEDURE — 84484 ASSAY OF TROPONIN QUANT: CPT

## 2017-01-19 PROCEDURE — 85610 PROTHROMBIN TIME: CPT

## 2017-01-19 PROCEDURE — 80053 COMPREHEN METABOLIC PANEL: CPT

## 2017-01-19 PROCEDURE — 84436 ASSAY OF TOTAL THYROXINE: CPT

## 2017-01-19 PROCEDURE — 94640 AIRWAY INHALATION TREATMENT: CPT

## 2017-01-19 PROCEDURE — 84145 PROCALCITONIN (PCT): CPT

## 2017-01-19 PROCEDURE — 87486 CHLMYD PNEUM DNA AMP PROBE: CPT

## 2017-01-19 PROCEDURE — 80162 ASSAY OF DIGOXIN TOTAL: CPT

## 2017-01-19 PROCEDURE — 97162 PT EVAL MOD COMPLEX 30 MIN: CPT

## 2017-01-19 PROCEDURE — 82803 BLOOD GASES ANY COMBINATION: CPT

## 2017-01-19 PROCEDURE — 82607 VITAMIN B-12: CPT

## 2017-01-19 PROCEDURE — 82150 ASSAY OF AMYLASE: CPT

## 2017-01-19 PROCEDURE — 82553 CREATINE MB FRACTION: CPT

## 2017-01-19 PROCEDURE — 85730 THROMBOPLASTIN TIME PARTIAL: CPT

## 2017-01-19 PROCEDURE — 99221 1ST HOSP IP/OBS SF/LOW 40: CPT

## 2017-01-19 PROCEDURE — 87633 RESP VIRUS 12-25 TARGETS: CPT

## 2017-01-19 PROCEDURE — 96365 THER/PROPH/DIAG IV INF INIT: CPT

## 2017-01-19 PROCEDURE — 99285 EMERGENCY DEPT VISIT HI MDM: CPT | Mod: 25

## 2017-01-19 PROCEDURE — 94664 DEMO&/EVAL PT USE INHALER: CPT

## 2017-01-19 PROCEDURE — 83605 ASSAY OF LACTIC ACID: CPT

## 2017-01-19 PROCEDURE — 84480 ASSAY TRIIODOTHYRONINE (T3): CPT

## 2017-01-19 PROCEDURE — 81001 URINALYSIS AUTO W/SCOPE: CPT

## 2017-01-19 PROCEDURE — 94760 N-INVAS EAR/PLS OXIMETRY 1: CPT

## 2017-01-19 PROCEDURE — 84443 ASSAY THYROID STIM HORMONE: CPT

## 2017-01-19 PROCEDURE — 85027 COMPLETE CBC AUTOMATED: CPT

## 2017-01-19 PROCEDURE — 99233 SBSQ HOSP IP/OBS HIGH 50: CPT

## 2017-01-19 PROCEDURE — 84100 ASSAY OF PHOSPHORUS: CPT

## 2017-01-19 RX ORDER — IPRATROPIUM/ALBUTEROL SULFATE 18-103MCG
3 AEROSOL WITH ADAPTER (GRAM) INHALATION EVERY 6 HOURS
Qty: 0 | Refills: 0 | Status: DISCONTINUED | OUTPATIENT
Start: 2017-01-19 | End: 2017-01-19

## 2017-01-19 RX ORDER — VALSARTAN 80 MG/1
1 TABLET ORAL
Qty: 0 | Refills: 0 | COMMUNITY

## 2017-01-19 RX ORDER — IPRATROPIUM/ALBUTEROL SULFATE 18-103MCG
3 AEROSOL WITH ADAPTER (GRAM) INHALATION
Qty: 0 | Refills: 0 | COMMUNITY
Start: 2017-01-19

## 2017-01-19 RX ADMIN — Medication 20 MILLIGRAM(S): at 05:36

## 2017-01-19 RX ADMIN — Medication 1 TABLET(S): at 13:08

## 2017-01-19 RX ADMIN — FLUTICASONE PROPIONATE AND SALMETEROL 1 DOSE(S): 50; 250 POWDER ORAL; RESPIRATORY (INHALATION) at 05:35

## 2017-01-19 RX ADMIN — TIOTROPIUM BROMIDE 1 CAPSULE(S): 18 CAPSULE ORAL; RESPIRATORY (INHALATION) at 05:36

## 2017-01-19 RX ADMIN — Medication 0.5 MILLIGRAM(S): at 09:11

## 2017-01-19 RX ADMIN — Medication 1 PATCH: at 13:09

## 2017-01-19 RX ADMIN — Medication 0.12 MILLIGRAM(S): at 05:36

## 2017-01-19 RX ADMIN — Medication 1 TABLET(S): at 09:11

## 2017-01-19 RX ADMIN — Medication 3 MILLILITER(S): at 09:20

## 2017-01-19 RX ADMIN — ESCITALOPRAM OXALATE 20 MILLIGRAM(S): 10 TABLET, FILM COATED ORAL at 13:09

## 2017-01-19 RX ADMIN — Medication 50 MILLIGRAM(S): at 15:09

## 2017-01-19 RX ADMIN — Medication 50 MILLIGRAM(S): at 05:36

## 2017-01-19 RX ADMIN — Medication 3 MILLILITER(S): at 13:54

## 2017-01-24 ENCOUNTER — RX RENEWAL (OUTPATIENT)
Age: 72
End: 2017-01-24

## 2017-01-24 DIAGNOSIS — Z79.01 LONG TERM (CURRENT) USE OF ANTICOAGULANTS: ICD-10-CM

## 2017-01-24 DIAGNOSIS — I10 ESSENTIAL (PRIMARY) HYPERTENSION: ICD-10-CM

## 2017-01-24 DIAGNOSIS — J44.1 CHRONIC OBSTRUCTIVE PULMONARY DISEASE WITH (ACUTE) EXACERBATION: ICD-10-CM

## 2017-01-24 DIAGNOSIS — F41.9 ANXIETY DISORDER, UNSPECIFIED: ICD-10-CM

## 2017-01-24 DIAGNOSIS — M47.894 OTHER SPONDYLOSIS, THORACIC REGION: ICD-10-CM

## 2017-01-24 DIAGNOSIS — K72.00 ACUTE AND SUBACUTE HEPATIC FAILURE WITHOUT COMA: ICD-10-CM

## 2017-01-24 DIAGNOSIS — J96.01 ACUTE RESPIRATORY FAILURE WITH HYPOXIA: ICD-10-CM

## 2017-01-24 DIAGNOSIS — J44.9 CHRONIC OBSTRUCTIVE PULMONARY DISEASE, UNSPECIFIED: ICD-10-CM

## 2017-01-24 DIAGNOSIS — E87.2 ACIDOSIS: ICD-10-CM

## 2017-01-24 DIAGNOSIS — J96.02 ACUTE RESPIRATORY FAILURE WITH HYPERCAPNIA: ICD-10-CM

## 2017-01-24 DIAGNOSIS — I48.91 UNSPECIFIED ATRIAL FIBRILLATION: ICD-10-CM

## 2017-01-24 DIAGNOSIS — R74.0 NONSPECIFIC ELEVATION OF LEVELS OF TRANSAMINASE AND LACTIC ACID DEHYDROGENASE [LDH]: ICD-10-CM

## 2017-01-24 DIAGNOSIS — M81.0 AGE-RELATED OSTEOPOROSIS WITHOUT CURRENT PATHOLOGICAL FRACTURE: ICD-10-CM

## 2017-01-24 DIAGNOSIS — M19.90 UNSPECIFIED OSTEOARTHRITIS, UNSPECIFIED SITE: ICD-10-CM

## 2017-01-24 DIAGNOSIS — J98.11 ATELECTASIS: ICD-10-CM

## 2017-01-24 DIAGNOSIS — I95.9 HYPOTENSION, UNSPECIFIED: ICD-10-CM

## 2017-01-24 DIAGNOSIS — F32.9 MAJOR DEPRESSIVE DISORDER, SINGLE EPISODE, UNSPECIFIED: ICD-10-CM

## 2017-01-24 DIAGNOSIS — R57.9 SHOCK, UNSPECIFIED: ICD-10-CM

## 2017-01-24 DIAGNOSIS — I24.8 OTHER FORMS OF ACUTE ISCHEMIC HEART DISEASE: ICD-10-CM

## 2017-01-24 DIAGNOSIS — N17.9 ACUTE KIDNEY FAILURE, UNSPECIFIED: ICD-10-CM

## 2017-01-24 DIAGNOSIS — J11.1 INFLUENZA DUE TO UNIDENTIFIED INFLUENZA VIRUS WITH OTHER RESPIRATORY MANIFESTATIONS: ICD-10-CM

## 2017-01-24 DIAGNOSIS — R62.7 ADULT FAILURE TO THRIVE: ICD-10-CM

## 2017-01-24 DIAGNOSIS — F17.210 NICOTINE DEPENDENCE, CIGARETTES, UNCOMPLICATED: ICD-10-CM

## 2017-01-24 DIAGNOSIS — R63.0 ANOREXIA: ICD-10-CM

## 2017-02-06 PROBLEM — S42.309A UNSPECIFIED FRACTURE OF SHAFT OF HUMERUS, UNSPECIFIED ARM, INITIAL ENCOUNTER FOR CLOSED FRACTURE: Chronic | Status: ACTIVE | Noted: 2017-01-11

## 2017-02-06 PROBLEM — F32.9 MAJOR DEPRESSIVE DISORDER, SINGLE EPISODE, UNSPECIFIED: Chronic | Status: ACTIVE | Noted: 2017-01-11

## 2017-02-10 ENCOUNTER — NON-APPOINTMENT (OUTPATIENT)
Age: 72
End: 2017-02-10

## 2017-02-10 ENCOUNTER — APPOINTMENT (OUTPATIENT)
Dept: CARDIOLOGY | Facility: CLINIC | Age: 72
End: 2017-02-10

## 2017-02-10 VITALS
WEIGHT: 172 LBS | OXYGEN SATURATION: 96 % | SYSTOLIC BLOOD PRESSURE: 133 MMHG | TEMPERATURE: 98.1 F | HEIGHT: 66.5 IN | BODY MASS INDEX: 27.32 KG/M2 | HEART RATE: 133 BPM | DIASTOLIC BLOOD PRESSURE: 86 MMHG

## 2017-02-10 DIAGNOSIS — R42 DIZZINESS AND GIDDINESS: ICD-10-CM

## 2017-02-14 LAB
ALBUMIN SERPL ELPH-MCNC: 4.1 G/DL
ALP BLD-CCNC: 128 U/L
ALT SERPL-CCNC: 29 U/L
ANION GAP SERPL CALC-SCNC: 16 MMOL/L
AST SERPL-CCNC: 27 U/L
BASOPHILS # BLD AUTO: 0.02 K/UL
BASOPHILS NFR BLD AUTO: 0.3 %
BILIRUB SERPL-MCNC: 0.3 MG/DL
BUN SERPL-MCNC: 14 MG/DL
CALCIUM SERPL-MCNC: 9.9 MG/DL
CHLORIDE SERPL-SCNC: 105 MMOL/L
CHOLEST SERPL-MCNC: 207 MG/DL
CHOLEST/HDLC SERPL: 3.4 RATIO
CO2 SERPL-SCNC: 23 MMOL/L
CREAT SERPL-MCNC: 0.76 MG/DL
DIGOXIN SERPL-MCNC: 0.8 NG/ML
EOSINOPHIL # BLD AUTO: 0.08 K/UL
EOSINOPHIL NFR BLD AUTO: 1.2 %
GLUCOSE SERPL-MCNC: 99 MG/DL
HBA1C MFR BLD HPLC: 5.7 %
HCT VFR BLD CALC: 43.5 %
HDLC SERPL-MCNC: 61 MG/DL
HGB BLD-MCNC: 13.9 G/DL
IMM GRANULOCYTES NFR BLD AUTO: 0.6 %
LDLC SERPL CALC-MCNC: 118 MG/DL
LYMPHOCYTES # BLD AUTO: 1.67 K/UL
LYMPHOCYTES NFR BLD AUTO: 25.6 %
MAN DIFF?: NORMAL
MCHC RBC-ENTMCNC: 32 GM/DL
MCHC RBC-ENTMCNC: 32.6 PG
MCV RBC AUTO: 102.1 FL
MONOCYTES # BLD AUTO: 0.49 K/UL
MONOCYTES NFR BLD AUTO: 7.5 %
NEUTROPHILS # BLD AUTO: 4.22 K/UL
NEUTROPHILS NFR BLD AUTO: 64.8 %
NT-PROBNP SERPL-MCNC: 432 PG/ML
PLATELET # BLD AUTO: 269 K/UL
POTASSIUM SERPL-SCNC: 5.2 MMOL/L
PROT SERPL-MCNC: 7 G/DL
RBC # BLD: 4.26 M/UL
RBC # FLD: 13.9 %
SODIUM SERPL-SCNC: 144 MMOL/L
TRIGL SERPL-MCNC: 140 MG/DL
WBC # FLD AUTO: 6.52 K/UL

## 2017-02-14 NOTE — DISCHARGE NOTE ADULT - MEDICATION SUMMARY - MEDICATIONS TO TAKE
I will START or STAY ON the medications listed below when I get home from the hospital:    predniSONE 20 mg oral tablet  -- 1 tab(s) by mouth once a day  20 MG x 3 days, 10mg x 4 days, then DC  -- Indication: For COPD (chronic obstructive pulmonary disease)    valsartan 160 mg oral tablet  -- 1 tab(s) by mouth once a day  -- Indication: For HTN    digoxin 125 mcg (0.125 mg) oral tablet  -- 1 tab(s) by mouth once a day  -- Indication: For AFib    Xarelto 20 mg oral tablet  -- 1 tab(s) by mouth once a day (in the evening)  -- Indication: For Afib    escitalopram 20 mg oral tablet  -- 1 tab(s) by mouth once a day  -- Indication: For Depression    mirtazapine 7.5 mg oral tablet  -- 1 tab(s) by mouth once a day (at bedtime)  -- Indication: For Depression    ALPRAZolam 0.5 mg oral tablet  -- 1 tab(s) by mouth 3 times a day, As Needed anxiety  -- Indication: For Anxiety    metoprolol tartrate 50 mg oral tablet  -- 1 tab(s) by mouth 3 times a day  -- Indication: For HTN/Afib    albuterol 2.5 mg/3 mL (0.083%) inhalation solution  -- 2.5 milligram(s) inhaled every 6 hours prn  -- Indication: For COPD (chronic obstructive pulmonary disease)    fluticasone-salmeterol  --   250-50, 1 puff BID  -- Indication: For COPD (chronic obstructive pulmonary disease)    tiotropium 18 mcg inhalation capsule  -- 1 cap(s) inhaled once a day  -- Indication: For COPD (chronic obstructive pulmonary disease)    Pepcid 20 mg oral tablet  -- 1 tab(s) by mouth once a day  -- Indication: For GI PPX    potassium phosphate-sodium phosphate 305 mg-700 mg oral tablet  -- 1 tab(s) by mouth 4 times a day (with meals and at bedtime)  -- Indication: For Suppliment    nicotine 14 mg/24 hr transdermal film, extended release  --  by transdermal patch daily  -- Indication: For Smoking cesation I will START or STAY ON the medications listed below when I get home from the hospital:    predniSONE 20 mg oral tablet  -- 1 tab(s) by mouth once a day  20 MG x 3 days, 10mg x 4 days, then DC  -- Indication: For COPD (chronic obstructive pulmonary disease)    diltiaZEM 240 mg/24 hours oral capsule, extended release  -- 1 cap(s) by mouth once a day  -- Indication: For Atrial fibrillation    diltiaZEM 60 mg oral tablet  -- 1 tab(s) by mouth last dose at  11 PM  -- Indication: For Atrial fibrillation    digoxin 125 mcg (0.125 mg) oral tablet  -- 1 tab(s) by mouth once a day  -- Indication: For AFib    Xarelto 20 mg oral tablet  -- 1 tab(s) by mouth once a day (in the evening)  -- Indication: For Afib    escitalopram 20 mg oral tablet  -- 1 tab(s) by mouth once a day  -- Indication: For Depression    mirtazapine 7.5 mg oral tablet  -- 1 tab(s) by mouth once a day (at bedtime)  -- Indication: For Depression    ALPRAZolam 0.5 mg oral tablet  -- 1 tab(s) by mouth 3 times a day, As Needed anxiety  -- Indication: For Anxiety    metoprolol tartrate 50 mg oral tablet  -- 1 tab(s) by mouth 3 times a day  -- Indication: For HTN/Afib    albuterol 2.5 mg/3 mL (0.083%) inhalation solution  -- 2.5 milligram(s) inhaled every 6 hours prn  -- Indication: For COPD (chronic obstructive pulmonary disease)    fluticasone-salmeterol  --   250-50, 1 puff BID  -- Indication: For COPD (chronic obstructive pulmonary disease)    tiotropium 18 mcg inhalation capsule  -- 1 cap(s) inhaled once a day  -- Indication: For COPD (chronic obstructive pulmonary disease)    albuterol-ipratropium 2.5 mg-0.5 mg/3 mL inhalation solution  -- 3 milliliter(s) inhaled every 6 hours RTC  -- Indication: For Chronic obstructive pulmonary disease, unspecified COPD type    Pepcid 20 mg oral tablet  -- 1 tab(s) by mouth once a day  -- Indication: For GI PPX    potassium phosphate-sodium phosphate 305 mg-700 mg oral tablet  -- 1 tab(s) by mouth 2 times a day  -- Indication: For Supplements    nicotine 14 mg/24 hr transdermal film, extended release  --  by transdermal patch daily  -- Indication: For Smoking cesation not applicable

## 2017-02-21 ENCOUNTER — RX RENEWAL (OUTPATIENT)
Age: 72
End: 2017-02-21

## 2017-03-02 ENCOUNTER — RX RENEWAL (OUTPATIENT)
Age: 72
End: 2017-03-02

## 2017-03-21 ENCOUNTER — APPOINTMENT (OUTPATIENT)
Dept: CARDIOLOGY | Facility: CLINIC | Age: 72
End: 2017-03-21

## 2017-03-21 ENCOUNTER — NON-APPOINTMENT (OUTPATIENT)
Age: 72
End: 2017-03-21

## 2017-03-21 VITALS
SYSTOLIC BLOOD PRESSURE: 160 MMHG | OXYGEN SATURATION: 92 % | HEIGHT: 66.5 IN | HEART RATE: 94 BPM | BODY MASS INDEX: 28.3 KG/M2 | TEMPERATURE: 97.7 F | WEIGHT: 178.2 LBS | DIASTOLIC BLOOD PRESSURE: 76 MMHG

## 2017-03-21 VITALS — SYSTOLIC BLOOD PRESSURE: 126 MMHG | DIASTOLIC BLOOD PRESSURE: 74 MMHG | HEART RATE: 68 BPM

## 2017-03-24 ENCOUNTER — RX RENEWAL (OUTPATIENT)
Age: 72
End: 2017-03-24

## 2017-03-24 RX ORDER — FLUTICASONE PROPIONATE AND SALMETEROL 50; 250 UG/1; UG/1
250-50 POWDER RESPIRATORY (INHALATION)
Qty: 60 | Refills: 0 | Status: ACTIVE | COMMUNITY
Start: 2017-03-24 | End: 1900-01-01

## 2017-04-03 ENCOUNTER — MEDICATION RENEWAL (OUTPATIENT)
Age: 72
End: 2017-04-03

## 2017-04-03 ENCOUNTER — RX RENEWAL (OUTPATIENT)
Age: 72
End: 2017-04-03

## 2017-05-03 ENCOUNTER — RX RENEWAL (OUTPATIENT)
Age: 72
End: 2017-05-03

## 2017-05-30 ENCOUNTER — RX RENEWAL (OUTPATIENT)
Age: 72
End: 2017-05-30

## 2017-06-19 ENCOUNTER — RX RENEWAL (OUTPATIENT)
Age: 72
End: 2017-06-19

## 2017-07-07 ENCOUNTER — RX RENEWAL (OUTPATIENT)
Age: 72
End: 2017-07-07

## 2017-07-25 ENCOUNTER — APPOINTMENT (OUTPATIENT)
Dept: CARDIOLOGY | Facility: CLINIC | Age: 72
End: 2017-07-25

## 2017-07-25 ENCOUNTER — NON-APPOINTMENT (OUTPATIENT)
Age: 72
End: 2017-07-25

## 2017-07-25 VITALS — DIASTOLIC BLOOD PRESSURE: 78 MMHG | HEART RATE: 76 BPM | SYSTOLIC BLOOD PRESSURE: 150 MMHG

## 2017-07-25 VITALS
SYSTOLIC BLOOD PRESSURE: 165 MMHG | BODY MASS INDEX: 28.93 KG/M2 | HEIGHT: 66 IN | HEART RATE: 78 BPM | OXYGEN SATURATION: 94 % | TEMPERATURE: 97.7 F | DIASTOLIC BLOOD PRESSURE: 79 MMHG | WEIGHT: 180 LBS

## 2017-07-25 DIAGNOSIS — J43.9 EMPHYSEMA, UNSPECIFIED: ICD-10-CM

## 2017-07-28 LAB
ALBUMIN SERPL ELPH-MCNC: 4.6 G/DL
ALP BLD-CCNC: 132 U/L
ALT SERPL-CCNC: 13 U/L
ANION GAP SERPL CALC-SCNC: 22 MMOL/L
AST SERPL-CCNC: 20 U/L
BASOPHILS # BLD AUTO: 0.01 K/UL
BASOPHILS NFR BLD AUTO: 0.2 %
BILIRUB SERPL-MCNC: 0.3 MG/DL
BUN SERPL-MCNC: 15 MG/DL
CALCIUM SERPL-MCNC: 9.9 MG/DL
CHLORIDE SERPL-SCNC: 104 MMOL/L
CHOLEST SERPL-MCNC: 137 MG/DL
CHOLEST/HDLC SERPL: 3 RATIO
CO2 SERPL-SCNC: 22 MMOL/L
CREAT SERPL-MCNC: 0.89 MG/DL
DIGOXIN SERPL-MCNC: 0.7 NG/ML
EOSINOPHIL # BLD AUTO: 0.06 K/UL
EOSINOPHIL NFR BLD AUTO: 1 %
GLUCOSE SERPL-MCNC: 92 MG/DL
HBA1C MFR BLD HPLC: 5.6 %
HCT VFR BLD CALC: 38.6 %
HDLC SERPL-MCNC: 45 MG/DL
HGB BLD-MCNC: 11.4 G/DL
IMM GRANULOCYTES NFR BLD AUTO: 0.5 %
LDLC SERPL CALC-MCNC: 64 MG/DL
LYMPHOCYTES # BLD AUTO: 1.91 K/UL
LYMPHOCYTES NFR BLD AUTO: 31.1 %
MAN DIFF?: NORMAL
MCHC RBC-ENTMCNC: 25.1 PG
MCHC RBC-ENTMCNC: 29.5 GM/DL
MCV RBC AUTO: 85 FL
MONOCYTES # BLD AUTO: 0.47 K/UL
MONOCYTES NFR BLD AUTO: 7.6 %
NEUTROPHILS # BLD AUTO: 3.67 K/UL
NEUTROPHILS NFR BLD AUTO: 59.6 %
NT-PROBNP SERPL-MCNC: 856 PG/ML
PLATELET # BLD AUTO: 335 K/UL
POTASSIUM SERPL-SCNC: 5.2 MMOL/L
PROT SERPL-MCNC: 7.8 G/DL
RBC # BLD: 4.54 M/UL
RBC # FLD: 14.9 %
SODIUM SERPL-SCNC: 148 MMOL/L
TRIGL SERPL-MCNC: 141 MG/DL
TSH SERPL-ACNC: 1.28 UIU/ML
WBC # FLD AUTO: 6.15 K/UL

## 2017-08-02 ENCOUNTER — RX RENEWAL (OUTPATIENT)
Age: 72
End: 2017-08-02

## 2017-08-25 ENCOUNTER — RX RENEWAL (OUTPATIENT)
Age: 72
End: 2017-08-25

## 2017-09-25 ENCOUNTER — RX RENEWAL (OUTPATIENT)
Age: 72
End: 2017-09-25

## 2017-10-20 ENCOUNTER — MEDICATION RENEWAL (OUTPATIENT)
Age: 72
End: 2017-10-20

## 2017-10-21 RX ORDER — AZITHROMYCIN DIHYDRATE 250 MG/1
250 TABLET, FILM COATED ORAL
Qty: 1 | Refills: 0 | Status: ACTIVE | COMMUNITY
Start: 2017-10-20

## 2017-10-25 ENCOUNTER — RX RENEWAL (OUTPATIENT)
Age: 72
End: 2017-10-25

## 2017-10-27 ENCOUNTER — RX RENEWAL (OUTPATIENT)
Age: 72
End: 2017-10-27

## 2017-11-22 ENCOUNTER — RX RENEWAL (OUTPATIENT)
Age: 72
End: 2017-11-22

## 2017-11-26 ENCOUNTER — RX RENEWAL (OUTPATIENT)
Age: 72
End: 2017-11-26

## 2017-12-05 ENCOUNTER — LABORATORY RESULT (OUTPATIENT)
Age: 72
End: 2017-12-05

## 2017-12-05 ENCOUNTER — APPOINTMENT (OUTPATIENT)
Dept: CARDIOLOGY | Facility: CLINIC | Age: 72
End: 2017-12-05
Payer: MEDICARE

## 2017-12-05 ENCOUNTER — NON-APPOINTMENT (OUTPATIENT)
Age: 72
End: 2017-12-05

## 2017-12-05 VITALS
SYSTOLIC BLOOD PRESSURE: 138 MMHG | HEIGHT: 66 IN | TEMPERATURE: 97.4 F | BODY MASS INDEX: 28.45 KG/M2 | OXYGEN SATURATION: 92 % | DIASTOLIC BLOOD PRESSURE: 68 MMHG | WEIGHT: 177 LBS | HEART RATE: 107 BPM

## 2017-12-05 PROCEDURE — 90670 PCV13 VACCINE IM: CPT

## 2017-12-05 PROCEDURE — 99215 OFFICE O/P EST HI 40 MIN: CPT | Mod: 25

## 2017-12-05 PROCEDURE — G0009: CPT

## 2017-12-05 PROCEDURE — 93000 ELECTROCARDIOGRAM COMPLETE: CPT

## 2017-12-05 PROCEDURE — 36415 COLL VENOUS BLD VENIPUNCTURE: CPT

## 2017-12-05 RX ORDER — METOPROLOL TARTRATE 50 MG/1
50 TABLET, FILM COATED ORAL
Qty: 90 | Refills: 0 | Status: DISCONTINUED | COMMUNITY
Start: 2017-03-24 | End: 2017-12-05

## 2017-12-06 LAB
ALBUMIN SERPL ELPH-MCNC: 3.8 G/DL
ALP BLD-CCNC: 100 U/L
ALT SERPL-CCNC: 11 U/L
ANION GAP SERPL CALC-SCNC: 18 MMOL/L
AST SERPL-CCNC: 15 U/L
BASOPHILS # BLD AUTO: 0.14 K/UL
BASOPHILS NFR BLD AUTO: 2 %
BILIRUB SERPL-MCNC: 0.4 MG/DL
BUN SERPL-MCNC: 15 MG/DL
CALCIUM SERPL-MCNC: 9.3 MG/DL
CHLORIDE SERPL-SCNC: 101 MMOL/L
CHOLEST SERPL-MCNC: 110 MG/DL
CHOLEST/HDLC SERPL: 2.7 RATIO
CO2 SERPL-SCNC: 23 MMOL/L
CREAT SERPL-MCNC: 0.84 MG/DL
DIGOXIN SERPL-MCNC: 0.9 NG/ML
EOSINOPHIL # BLD AUTO: 0.14 K/UL
EOSINOPHIL NFR BLD AUTO: 2 %
GLUCOSE SERPL-MCNC: 120 MG/DL
HBA1C MFR BLD HPLC: 5.6 %
HCT VFR BLD CALC: 25.7 %
HDLC SERPL-MCNC: 41 MG/DL
HGB BLD-MCNC: 6.4 G/DL
LDLC SERPL CALC-MCNC: 45 MG/DL
LYMPHOCYTES # BLD AUTO: 1.58 K/UL
LYMPHOCYTES NFR BLD AUTO: 22 %
MAN DIFF?: NORMAL
MCHC RBC-ENTMCNC: 17.9 PG
MCHC RBC-ENTMCNC: 24.9 GM/DL
MCV RBC AUTO: 72 FL
MONOCYTES # BLD AUTO: 0.65 K/UL
MONOCYTES NFR BLD AUTO: 9 %
NEUTROPHILS # BLD AUTO: 4.59 K/UL
NEUTROPHILS NFR BLD AUTO: 64 %
NT-PROBNP SERPL-MCNC: 971 PG/ML
PLATELET # BLD AUTO: 306 K/UL
POTASSIUM SERPL-SCNC: 4.5 MMOL/L
PROT SERPL-MCNC: 7.2 G/DL
RBC # BLD: 3.57 M/UL
RBC # FLD: 17.2 %
SODIUM SERPL-SCNC: 142 MMOL/L
TRIGL SERPL-MCNC: 118 MG/DL
WBC # FLD AUTO: 7.17 K/UL

## 2017-12-07 LAB — TSH SERPL-ACNC: 0.81 UIU/ML

## 2017-12-26 ENCOUNTER — RX RENEWAL (OUTPATIENT)
Age: 72
End: 2017-12-26

## 2018-01-22 ENCOUNTER — RX RENEWAL (OUTPATIENT)
Age: 73
End: 2018-01-22

## 2018-02-05 ENCOUNTER — RX RENEWAL (OUTPATIENT)
Age: 73
End: 2018-02-05

## 2018-02-22 ENCOUNTER — RX RENEWAL (OUTPATIENT)
Age: 73
End: 2018-02-22

## 2018-03-04 ENCOUNTER — RX RENEWAL (OUTPATIENT)
Age: 73
End: 2018-03-04

## 2018-03-12 ENCOUNTER — APPOINTMENT (OUTPATIENT)
Dept: CARDIOLOGY | Facility: CLINIC | Age: 73
End: 2018-03-12
Payer: MEDICARE

## 2018-03-12 ENCOUNTER — NON-APPOINTMENT (OUTPATIENT)
Age: 73
End: 2018-03-12

## 2018-03-12 VITALS
TEMPERATURE: 97.4 F | OXYGEN SATURATION: 96 % | BODY MASS INDEX: 28.61 KG/M2 | SYSTOLIC BLOOD PRESSURE: 127 MMHG | WEIGHT: 178 LBS | HEIGHT: 66 IN | DIASTOLIC BLOOD PRESSURE: 81 MMHG | HEART RATE: 85 BPM

## 2018-03-12 DIAGNOSIS — D50.0 IRON DEFICIENCY ANEMIA SECONDARY TO BLOOD LOSS (CHRONIC): ICD-10-CM

## 2018-03-12 DIAGNOSIS — F32.9 MAJOR DEPRESSIVE DISORDER, SINGLE EPISODE, UNSPECIFIED: ICD-10-CM

## 2018-03-12 PROCEDURE — 36415 COLL VENOUS BLD VENIPUNCTURE: CPT

## 2018-03-12 PROCEDURE — 93000 ELECTROCARDIOGRAM COMPLETE: CPT

## 2018-03-12 PROCEDURE — 99214 OFFICE O/P EST MOD 30 MIN: CPT

## 2018-03-13 ENCOUNTER — NON-APPOINTMENT (OUTPATIENT)
Age: 73
End: 2018-03-13

## 2018-03-13 LAB
ALBUMIN SERPL ELPH-MCNC: 4.2 G/DL
ALP BLD-CCNC: 94 U/L
ALT SERPL-CCNC: 21 U/L
ANION GAP SERPL CALC-SCNC: 26 MMOL/L
AST SERPL-CCNC: 26 U/L
BASOPHILS # BLD AUTO: 0.02 K/UL
BASOPHILS NFR BLD AUTO: 0.3 %
BILIRUB SERPL-MCNC: 0.3 MG/DL
BUN SERPL-MCNC: 20 MG/DL
CALCIUM SERPL-MCNC: 10.5 MG/DL
CHLORIDE SERPL-SCNC: 98 MMOL/L
CHOLEST SERPL-MCNC: 210 MG/DL
CHOLEST/HDLC SERPL: 5 RATIO
CO2 SERPL-SCNC: 17 MMOL/L
CREAT SERPL-MCNC: 0.8 MG/DL
DIGOXIN SERPL-MCNC: 0.6 NG/ML
EOSINOPHIL # BLD AUTO: 0.09 K/UL
EOSINOPHIL NFR BLD AUTO: 1.2 %
FERRITIN SERPL-MCNC: 86 NG/ML
GLUCOSE SERPL-MCNC: 85 MG/DL
HBA1C MFR BLD HPLC: 5.7 %
HCT VFR BLD CALC: 49.6 %
HDLC SERPL-MCNC: 42 MG/DL
HGB BLD-MCNC: 15.7 G/DL
IMM GRANULOCYTES NFR BLD AUTO: 0.4 %
IRON SATN MFR SERPL: 35 %
IRON SERPL-MCNC: 133 UG/DL
LDLC SERPL CALC-MCNC: 121 MG/DL
LYMPHOCYTES # BLD AUTO: 2.49 K/UL
LYMPHOCYTES NFR BLD AUTO: 32.3 %
MAN DIFF?: NORMAL
MCHC RBC-ENTMCNC: 30.3 PG
MCHC RBC-ENTMCNC: 31.7 GM/DL
MCV RBC AUTO: 95.6 FL
MONOCYTES # BLD AUTO: 0.6 K/UL
MONOCYTES NFR BLD AUTO: 7.8 %
NEUTROPHILS # BLD AUTO: 4.49 K/UL
NEUTROPHILS NFR BLD AUTO: 58 %
NT-PROBNP SERPL-MCNC: 584 PG/ML
PLATELET # BLD AUTO: 252 K/UL
POTASSIUM SERPL-SCNC: 5.3 MMOL/L
PROT SERPL-MCNC: 8.1 G/DL
RBC # BLD: 5.19 M/UL
RBC # FLD: 16.4 %
SODIUM SERPL-SCNC: 141 MMOL/L
TIBC SERPL-MCNC: 376 UG/DL
TRIGL SERPL-MCNC: 233 MG/DL
TSH SERPL-ACNC: 1.04 UIU/ML
UIBC SERPL-MCNC: 243 UG/DL
WBC # FLD AUTO: 7.72 K/UL

## 2018-03-27 ENCOUNTER — RX RENEWAL (OUTPATIENT)
Age: 73
End: 2018-03-27

## 2018-05-23 ENCOUNTER — RX RENEWAL (OUTPATIENT)
Age: 73
End: 2018-05-23

## 2018-05-29 ENCOUNTER — RX RENEWAL (OUTPATIENT)
Age: 73
End: 2018-05-29

## 2018-05-30 ENCOUNTER — RX RENEWAL (OUTPATIENT)
Age: 73
End: 2018-05-30

## 2018-07-17 ENCOUNTER — NON-APPOINTMENT (OUTPATIENT)
Age: 73
End: 2018-07-17

## 2018-07-17 ENCOUNTER — APPOINTMENT (OUTPATIENT)
Dept: CARDIOLOGY | Facility: CLINIC | Age: 73
End: 2018-07-17
Payer: MEDICARE

## 2018-07-17 VITALS
BODY MASS INDEX: 28.28 KG/M2 | HEART RATE: 96 BPM | DIASTOLIC BLOOD PRESSURE: 86 MMHG | OXYGEN SATURATION: 94 % | WEIGHT: 176 LBS | SYSTOLIC BLOOD PRESSURE: 157 MMHG | TEMPERATURE: 97.5 F | HEIGHT: 66 IN

## 2018-07-17 VITALS — SYSTOLIC BLOOD PRESSURE: 152 MMHG | DIASTOLIC BLOOD PRESSURE: 70 MMHG | HEART RATE: 88 BPM

## 2018-07-17 PROCEDURE — 36415 COLL VENOUS BLD VENIPUNCTURE: CPT

## 2018-07-17 PROCEDURE — 99214 OFFICE O/P EST MOD 30 MIN: CPT

## 2018-07-17 PROCEDURE — 93000 ELECTROCARDIOGRAM COMPLETE: CPT

## 2018-07-18 LAB
ALBUMIN SERPL ELPH-MCNC: 4.5 G/DL
ALP BLD-CCNC: 122 U/L
ALT SERPL-CCNC: 23 U/L
ANION GAP SERPL CALC-SCNC: 17 MMOL/L
AST SERPL-CCNC: 22 U/L
BASOPHILS # BLD AUTO: 0.01 K/UL
BASOPHILS NFR BLD AUTO: 0.1 %
BILIRUB SERPL-MCNC: 0.4 MG/DL
BUN SERPL-MCNC: 14 MG/DL
CALCIUM SERPL-MCNC: 10 MG/DL
CHLORIDE SERPL-SCNC: 101 MMOL/L
CHOLEST SERPL-MCNC: 178 MG/DL
CHOLEST/HDLC SERPL: 3.8 RATIO
CO2 SERPL-SCNC: 25 MMOL/L
CREAT SERPL-MCNC: 0.72 MG/DL
DIGOXIN SERPL-MCNC: 0.8 NG/ML
EOSINOPHIL # BLD AUTO: 0.05 K/UL
EOSINOPHIL NFR BLD AUTO: 0.6 %
GLUCOSE SERPL-MCNC: 101 MG/DL
HBA1C MFR BLD HPLC: 5.6 %
HCT VFR BLD CALC: 49.4 %
HDLC SERPL-MCNC: 47 MG/DL
HGB BLD-MCNC: 16.2 G/DL
IMM GRANULOCYTES NFR BLD AUTO: 0.5 %
IRON SATN MFR SERPL: 32 %
IRON SERPL-MCNC: 140 UG/DL
LDLC SERPL CALC-MCNC: 95 MG/DL
LYMPHOCYTES # BLD AUTO: 2.57 K/UL
LYMPHOCYTES NFR BLD AUTO: 30 %
MAN DIFF?: NORMAL
MCHC RBC-ENTMCNC: 31.5 PG
MCHC RBC-ENTMCNC: 32.8 GM/DL
MCV RBC AUTO: 95.9 FL
MONOCYTES # BLD AUTO: 0.69 K/UL
MONOCYTES NFR BLD AUTO: 8 %
NEUTROPHILS # BLD AUTO: 5.22 K/UL
NEUTROPHILS NFR BLD AUTO: 60.8 %
NT-PROBNP SERPL-MCNC: 456 PG/ML
PLATELET # BLD AUTO: 258 K/UL
POTASSIUM SERPL-SCNC: 4.1 MMOL/L
PROT SERPL-MCNC: 7.5 G/DL
RBC # BLD: 5.15 M/UL
RBC # FLD: 12.8 %
SODIUM SERPL-SCNC: 143 MMOL/L
TIBC SERPL-MCNC: 432 UG/DL
TRIGL SERPL-MCNC: 179 MG/DL
UIBC SERPL-MCNC: 292 UG/DL
WBC # FLD AUTO: 8.58 K/UL

## 2018-07-19 LAB
FERRITIN SERPL-MCNC: 103 NG/ML
TSH SERPL-ACNC: 1.21 UIU/ML

## 2018-07-23 ENCOUNTER — RX RENEWAL (OUTPATIENT)
Age: 73
End: 2018-07-23

## 2018-08-29 ENCOUNTER — RX RENEWAL (OUTPATIENT)
Age: 73
End: 2018-08-29

## 2018-08-30 ENCOUNTER — RX RENEWAL (OUTPATIENT)
Age: 73
End: 2018-08-30

## 2018-09-12 ENCOUNTER — RX RENEWAL (OUTPATIENT)
Age: 73
End: 2018-09-12

## 2018-09-28 ENCOUNTER — RX RENEWAL (OUTPATIENT)
Age: 73
End: 2018-09-28

## 2018-11-11 ENCOUNTER — RX RENEWAL (OUTPATIENT)
Age: 73
End: 2018-11-11

## 2018-11-14 ENCOUNTER — APPOINTMENT (OUTPATIENT)
Dept: CARDIOLOGY | Facility: CLINIC | Age: 73
End: 2018-11-14
Payer: MEDICARE

## 2018-11-14 VITALS
SYSTOLIC BLOOD PRESSURE: 139 MMHG | DIASTOLIC BLOOD PRESSURE: 83 MMHG | OXYGEN SATURATION: 97 % | HEART RATE: 81 BPM | BODY MASS INDEX: 28.61 KG/M2 | WEIGHT: 178 LBS | TEMPERATURE: 97.9 F | HEIGHT: 66 IN

## 2018-11-14 PROCEDURE — 36415 COLL VENOUS BLD VENIPUNCTURE: CPT

## 2018-11-14 PROCEDURE — 93000 ELECTROCARDIOGRAM COMPLETE: CPT

## 2018-11-14 PROCEDURE — 99214 OFFICE O/P EST MOD 30 MIN: CPT

## 2018-11-14 NOTE — REVIEW OF SYSTEMS
[Change In Color Of Skin] : change in skin color [see HPI] : see HPI [Depression] : depression [Negative] : Heme/Lymph [Recent Weight Gain (___ Lbs)] : no recent weight gain [Feeling Fatigued] : not feeling fatigued [Recent Weight Loss (___ Lbs)] : no recent weight loss [Sinus Pressure] : no sinus pressure [Shortness Of Breath] : no shortness of breath [Dyspnea on exertion] : dyspnea during exertion [Chest Pain] : no chest pain [Lower Ext Edema] : no extremity edema [Palpitations] : no palpitations [Cough] : no cough [Wheezing] : no wheezing [Nausea] : no nausea [Change in Appetite] : no change in appetite [Change In The Stool] : no change in stool [Dysphagia] : no dysphagia [Dizziness] : no dizziness [Anxiety] : no anxiety [Under Stress] : not under stress [Suicidal] : not suicidal

## 2018-11-14 NOTE — REASON FOR VISIT
[FreeTextEntry1] : Office visit for this 73-year-old woman with atrial fibrillation with difficult rate control and cardiomyopathy now having moved three hours away, found to have iron deficiency anemia, and then GI workup

## 2018-11-14 NOTE — HISTORY OF PRESENT ILLNESS
[FreeTextEntry1] : 2013 The patient had no previous cardiac history. She was seen 4 years prior at [facility] as preop for leg surgery after a fracture. She did tell the cardiologist about on-and-off palpitations. He found absolutely nothing and the workup was negative although she is not sure if she had a stress test or an echo at that time. She doesn't see doctors very often other than a pulmonologist for emphysema and a gastroenterologist for irritable bowel syndrome. She denied a history of hypertension, diabetes, hyperlipidemia and there was no family history of coronary artery disease. However she is smoking a pack a day. Complicating the issue is also a long history of panic attacks and anxiety that has kept her from doctor's visits and hospitalizations. She also been under a tremendous amount of stress recently due to a situation involving her daughter who lives [facility] and her expectation that her parents become full-time babysitters for them and move up there after an unexpected pregnancy.\par On October 23, 2013 she was having an endoscopy and she was found to be in rapid atrial fibrillation and sent to the emergency room. I saw her there and she achieved rate control with beta blockade, ruled out for myocardial infarction. An echocardiogram done the next morning was somewhat suboptimal despite IV echo contrast but overall LV function was mildly globally diminished with an ejection fraction by fractional shortening of 50% and a severely dilated left atrium. Obviously it is unclear how long she has been in the atrial fibrillation although she had seen an internist, Dr. Candido Barragan and has seen Dr. Beverly of pulmonary and Dr. Holt of GI and nobody had noted a rapid or irregular pulse. Due to her anxiety she was discharged the following morning on bisoprolol 10 mg for rate control,  Xarelto 20 mg for anticoagulation, her baseline Lexapro along with Xanax for anxiety. \par October 31, 2013. Here for outpatient followup. She has been feeling fine and denies chest pain or shortness of breath but still feels a lot of distress and is still in atrial fibrillation. Her heart rate was still somewhat rapid although she was asymptomatic. She was still smoking as well. I increased her beta blocker and discussed stopping smoking. She was told to come back in 2 weeks and then at that point we could start to think about cardioversion medically or electrically although she would need coronary artery disease ruled out first.\par November 13, 2013. She is here for followup on the higher dose of beta blocker. She feels perfectly fine and has no symptoms whatsoever. She is active. She has gained 1 pound. She was doing well and was scheduled for a stress echocardiogram. This was done on November 26 and in fact she was found to have a significantly dilated cardiomyopathy with moderate global dysfunction. There was no evidence of ischemia although there was poor visualization of the endocardium. The heart rate response was excessive still. Based on this I changed her to carvedilol 25 mg b.i.d. and added ramipril 5 q.d.\par December 17, 2013. The patient is here for followup. Her main issue is that after she takes her morning medicines she gets severely nauseous. The only medicine that fits the bill and is not in the afternoon is the Altace. Otherwise she has been doing fairly well with maybe some mild orthostasis. She had one episode on a Sunday where for about 45 seconds she felt flushed, lightheaded et cetera. This has not recurred. She knows when she gets her panic attacks her heart rate does go up but otherwise she thinks her heart rate has been better. I elected to stop her Altace and see if her nauseousness went away. I also decided to set her up for 3 weeks of CardioNet monitoring to rule out ventricular arrhythmias and to better assess her heart rate control in atrial fibrillation. \par January 22, 2014. The patient completed the CardioNet about a week or 2 ago. She had absolutely no ventricular tachycardia or even any significant number of VPCs. Her atrial fibrillation rate on the average was between 80 and 90 which is acceptable. They were times when she went well over 100. She is feeling fairly well and only notices a limitation in terms of shortness of breath when it is very cold outside. In the house and even going up stairs she has no symptoms. There has been no syncope or near-syncope and no nauseousness. Her panic attacks have been better and she is seeing her psychiatrist tomorrow. She is still struggling a lot with trying to stop smoking. We elected to give her 2 months more time and then repeat the echo and see if the LV function has improved with heart rate control. If not, we would then consider retrying an ACE or adding an ARB and setting her up for a defibrillator.\par March 18, 2014. The patient returns for followup and echocardiogram. She has occasional loose bowel movements and he did take Imodium on a twice a week. She denies exertional chest pain or shortness of breath or palpitations. She has been extremely anxious the last month about this appointment in terms of whether or not she needs a defibrillator. She would like to hold off unless it is actually necessary. In addition to a mixup with the schedule she had a very long wait in the office this morning and then on top of her usual anxiety in doctor's offices has caused her heart rate to be rather fast again. Her echo seemed even more suboptimal this time and had at least moderate systolic dysfunction. I elected to put her on Diovan 80 mg and have her come back in 2 months. Next is evaluation of LV function should be MUGA or MRI. The patient was urged to stop smoking.\par May 19, 2014. The patient is here for followup. Her breathing is worse with she and her son are attributing to the carvedilol and would like her switch to a cardioselective beta blocker. Not clear if she has any wheezing. In addition she has runny nose and dry mouth but she says that predated the Diovan. She is still anxious and has had panic attacks. She says she is trying to cut down on the smoking but hasn't had much success yet. There is no PND orthopnea or pedal edema.  She was convinced that her Coreg was causing shortness of breath because of her lung disease and so we switched her to bisoprolol. She felt "100 times better" however her labs showed abnormal LFTs which will be followed up next visit.\par June 16, 2014. The patient is here for followup. She initially remains tachycardic with atrial fibrillation. She still feels wonderful and all her family think she is doing wonderfully. She was convinced that she is back to normal and that her heart rate and blood pressure only elevated because she is coming to the doctor. Her LFTs were much better. In July she had gross hematuria which was caused by a UTI. Her Xarelto was held for one day and Bactrim started and asked her UTI resolved, the hematuria resolved and she went back on Xarelto.\par October 7, 2014. The patient is here for followup.  Once again she is tachycardic and hypertensive. Again she claims she has been feeling perfectly fine when she is not the office. Unfortunately she is still smoking. She has not gained weight no developed any pedal edema. She has not had any change in her exercise capacity although she admits she has become a couch potato. The labs were acceptable other than a nonfasting glucose of 133 and her SGPT came down to 73 with the SGOT normalized. Her  said he would buy an inexpensive stethoscope and follow her heart rate on his own.\par November 3, 2014. The patient called that for the last 2 weeks her heartbeat ranged from 74-98 with a mean of 84 and a median of 85. Based on that we will not change her medications. She is thinking of stopping the Lexapro as it was only given to help her stop smoking and has not helped. She said she will work on the smoking.\par January 19, 2015. Patient is here for followup. She is still in atrial fibrillation with the initial EKG having a heart rate greater than 150. After her blood work was done in the medical assistant repeated her vital signs her heart rate was down to 91 she is always nervous when she comes here that at some point we will call the ambulance and sent her to the hospital. Just recently she accompanied her sister to the hospital with possibly an abnormal heart rhythm and now the sister is scheduled for valve replacement. Patient is still smoking otherwise she feels well. We discussed her chronic anxiety disorder. She's not sure if she was doing better on Lexapro or not. She is taking Xanax 0.25 mg nightly. No complaints of chest pain shortness of breath syncope or near syncope. No complaints of palpitations. She has also developed a mild form of Raynaud's disease. In the cold her fingers get somewhat numb and turn a little white but they are not painful. We discussed the possibility of this being from her beta blocker but so far it is mild and tolerable. On further bursting she admits she gets more fatigue and maybe more short of breath than usual with exertion which he now blames on the couch potato orthopedic continued smoking.\par June 16, 2016. The patient is here for followup along with echocardiogram. She still has a pretty rapid heartbeat and atrial fibrillation and the echo is suboptimal but still shows significant LV dysfunction. Symptomatically she is fine. She felt that her Lexapro was increasing her heart rate so she stopped it and would like to try Wellbutrin. She is still smoking. She has been under a lot of stress as they sold their house and are moving to [place], but she would still like to come here to 3 times a year for followup. She had some issues with an ear infection that she got from her grandchildren and has no pain now but feels as if it is clogged on the left.  Again we had a long discussion about true heart rate controlled, true LVEF, and whether she is a candidate for defibrillator. We agreed to try increasing her bisoprolol to 15 mg and watch for both bronchospasm and Raynaud's, as well as at some point getting a nuclear MUGA scan to get a more accurate ejection fraction and then determine if she needs an AICD.\par October 27, 2015. The patient is here for followup. In the interim she had mild breakdown when she was unable to sell her house and move and is now on Celexa. Otherwise she has felt fine. She does get fatigued very easily. Denies shortness of breath. She had a MUGA scan at the [facility] and it showed ejection fraction 48% with global hypokinesis. He had initial EKG shows a heart rate of 103 which for her is pretty low. She thinks it's because I told her already she did not need a defibrillator. She also had a colonoscopy in the interim and some polyps were found.\par February 25, 2016. Patient is here for followup. On the whole doing well although she is nervous about her upcoming vacation ([place], [place]) because she is agoraphobic. She does feel some dyspnea on exertion and limited tolerance but no different than the past year or 2. No other complaints, no change in medications, no interval medical issues hospitalizations et cetera. After exam, I increased the valsartan 80 mg to 160 mg.\par July 11, 2016. Patient is here in followup. She is in good spirits, feels wonderful with no complaints. She is continuing to babysit her grandchild during the week. She had no problems with increasing the valsartan, including no worsening of her baseline allergic rhinorrhea. She thinks it might be from Celexa, and she cut back to 10 mg, but no change so far.\par November 14, 2016. Patient is seen in followup. Good spirits and more relaxed. Does admit she is more tired than usual, which he thinks is just because she is retired and not doing anything. She does admit to more shortness of breath going upstairs. She is still smoking. No chest pain. No change in medication or other medical interval issues.\par February 10, 2017. The patient is here in followup. Around January 12 the patient was hospitalized with influenza and rapid atrial fibrillation and then transferred to rehabilitation at [facility]. She was discharged one week ago and is now on Cardizem  mg, metoprolol 50 t.i.d., and digoxin 0.125. She is off bisoprolol and valsartan. She is still on Xarelto 20. She has a wrist blood pressure monitor and her blood pressures at home have been /53-65 and all asymptomatic and her heart rates have been between 60 and 80 but it is just measuring her peripheral pulse by her wrist. She was instructed not to take metoprolol if her blood pressure was below 110 so on the average she is only taking 50 mg once a day. She is here today and her monitor reads a heart rate of 86, while her EKG and exam showed a heart rate between 120 and 130. The blood pressure was relatively accurate although I got a little bit higher blood pressure when I checked, in the 150 range. She has had only one episode of lightheadedness and that is getting up and walking after a long car ride. Unfortunately, she did not take out her blood pressure monitor and check it then. The rest of her exam was unremarkable and the EKG just has some mild ST depressions as in the past. After long discussion I had her take her metoprolol 50 mg q.12 h., no change in the other medications, not to check her blood pressure, pulse, unless she has another dizzy episode and she will keep her upcoming appointment in one month\par 3/21/2017. Patient returns in followup. She is now on metoprolol 50 b.i.d. She is still not smoking. She has been using Advair and thinks she has been feeling better, but she has not seen pulmonary on the outside. She had seen Dr. Beverly many years ago, and she liked him but the office was very hectic. She has not been checking her blood pressure on her own. Here in the office her blood pressure was excellent and her heart rate was in the 80s.\par July 25, 2017. Patient has 3 hours away, but wants to keep coming here for followup. Has not smoked in 6 months and 14 days. Not using any inhalers because she thought they were affecting her vision and she seems to be doing fine although cannot walk very far without getting short of breath. Heart rate control seems much better with digoxin. No interval hospitalizations or medical issues.\par December 1, 2017. Patient is here in followup. She is unhappy with her move as they got a dog that she can't stand but her  likes and she is a little overwhelmed by her grandchildren being just 3 minutes away. The family is concerned as the patient stayed inside for 8 consecutive days and did not want to go out. She does have a psychiatrist that both her  and she think is crazy. She is on Lexapro 20 mg. In addition, she has some orthostatic symptoms. She looks a little pale, but denies any melena or change in bowel habits or appetite. She does not seem volume depleted. She is on Cardizem  mg daily, digoxin, 0.125, and metoprolol 50 every 8 hours, but only takes it twice a day. Labs were sent, and I am changing her to metoprolol , which she will take at night while she leaves the Cardizem CD for the morning.  Labs came back the next day with a hemoglobin of 6.4. Discussed with patient the and urged that she go to the hospital or at least to a gastroenterologist.\par December 7, 2017. Had a GI workup. No active bleeding. One lesion in the rectum that seemed to be the culprit was cauterized and a polyp removed. She was admitted to the hospital initially and transfused 2 units. She was told now to come back for colonoscopy in 5 years.\par March 12, 2018. Patient here in followup. As far as she can tell, no recurrence of GI bleeding. She is on iron twice a day, so her stool is still black. She is doing well overall although was not happy with her psychiatrist and on her own cut back the Lexapro from 20 mg to 10 mg daily; and no difference so far. Heart rate seems controlled on her current regimen, which does include digoxin. She remains anticoagulated on Xarelto\par July 17, 2018. Patient here in followup. No further GI bleeding. Off her iron. No complaints. Reviewed all her medications. Stopped her inhalers as she did not think they were helping.\par November 14, 2018. Patient here in followup.Remains in atrial fibrillation with a ventricular rate of 83, and ST changes, consistent with digoxin. Blood pressure and heart rate excellent despite being here in the office and despite the long drive with traffic from [place]. Not using any inhalers and also not seeing pulmonary. Does have chronic shortness of breath. O2 sat today 97%\par \par

## 2018-11-14 NOTE — CARDIOLOGY SUMMARY
[No Ischemia] : no Ischemia [LVEF ___%] : LVEF [unfilled]% [___] : [unfilled] [Enlarged] : enlarged LA size [Mild] : mild mitral regurgitation

## 2018-11-14 NOTE — PHYSICAL EXAM
[General Appearance - Well Developed] : well developed [Normal Appearance] : normal appearance [Well Groomed] : well groomed [General Appearance - Well Nourished] : well nourished [No Deformities] : no deformities [General Appearance - In No Acute Distress] : no acute distress [Normal Conjunctiva] : the conjunctiva exhibited no abnormalities [Eyelids - No Xanthelasma] : the eyelids demonstrated no xanthelasmas [Normal Oral Mucosa] : normal oral mucosa [No Oral Pallor] : no oral pallor [No Oral Cyanosis] : no oral cyanosis [Normal Jugular Venous V Waves Present] : normal jugular venous V waves present [No Jugular Venous June A Waves] : no jugular venous june A waves [Respiration, Rhythm And Depth] : normal respiratory rhythm and effort [Exaggerated Use Of Accessory Muscles For Inspiration] : no accessory muscle use [Auscultation Breath Sounds / Voice Sounds] : lungs were clear to auscultation bilaterally [Heart Rate And Rhythm] : heart rate and rhythm were normal [Heart Sounds] : normal S1 and S2 [Murmurs] : no murmurs present [Abdomen Soft] : soft [Abdomen Tenderness] : non-tender [Abdomen Mass (___ Cm)] : no abdominal mass palpated [Abnormal Walk] : normal gait [Gait - Sufficient For Exercise Testing] : the gait was sufficient for exercise testing [Nail Clubbing] : no clubbing of the fingernails [Cyanosis, Localized] : no localized cyanosis [Petechial Hemorrhages (___cm)] : no petechial hemorrhages [] : no rash [No Venous Stasis] : no venous stasis [Skin Lesions] : no skin lesions [No Skin Ulcers] : no skin ulcer [No Xanthoma] : no  xanthoma was observed [Oriented To Time, Place, And Person] : oriented to person, place, and time [Affect] : the affect was normal [Mood] : the mood was normal [FreeTextEntry1] : Much less anxious, cheerful, not depressed

## 2018-11-15 LAB
ALBUMIN SERPL ELPH-MCNC: 4.5 G/DL
ALP BLD-CCNC: 112 U/L
ALT SERPL-CCNC: 22 U/L
ANION GAP SERPL CALC-SCNC: 12 MMOL/L
AST SERPL-CCNC: 22 U/L
BASOPHILS # BLD AUTO: 0.01 K/UL
BASOPHILS NFR BLD AUTO: 0.1 %
BILIRUB SERPL-MCNC: 0.3 MG/DL
BUN SERPL-MCNC: 15 MG/DL
CALCIUM SERPL-MCNC: 9.7 MG/DL
CHLORIDE SERPL-SCNC: 102 MMOL/L
CHOLEST SERPL-MCNC: 168 MG/DL
CHOLEST/HDLC SERPL: 3.8 RATIO
CO2 SERPL-SCNC: 28 MMOL/L
CREAT SERPL-MCNC: 0.81 MG/DL
DIGOXIN SERPL-MCNC: 1.2 NG/ML
EOSINOPHIL # BLD AUTO: 0.04 K/UL
EOSINOPHIL NFR BLD AUTO: 0.5 %
GLUCOSE SERPL-MCNC: 96 MG/DL
HBA1C MFR BLD HPLC: 5.8 %
HCT VFR BLD CALC: 49.2 %
HDLC SERPL-MCNC: 44 MG/DL
HGB BLD-MCNC: 15.7 G/DL
IMM GRANULOCYTES NFR BLD AUTO: 0.4 %
LDLC SERPL CALC-MCNC: 88 MG/DL
LYMPHOCYTES # BLD AUTO: 2.06 K/UL
LYMPHOCYTES NFR BLD AUTO: 26.4 %
MAN DIFF?: NORMAL
MCHC RBC-ENTMCNC: 31.6 PG
MCHC RBC-ENTMCNC: 31.9 GM/DL
MCV RBC AUTO: 99 FL
MONOCYTES # BLD AUTO: 0.59 K/UL
MONOCYTES NFR BLD AUTO: 7.6 %
NEUTROPHILS # BLD AUTO: 5.08 K/UL
NEUTROPHILS NFR BLD AUTO: 65 %
NT-PROBNP SERPL-MCNC: 556 PG/ML
PLATELET # BLD AUTO: 265 K/UL
POTASSIUM SERPL-SCNC: 5.4 MMOL/L
PROT SERPL-MCNC: 7.1 G/DL
RBC # BLD: 4.97 M/UL
RBC # FLD: 13.4 %
SODIUM SERPL-SCNC: 142 MMOL/L
TRIGL SERPL-MCNC: 178 MG/DL
WBC # FLD AUTO: 7.81 K/UL

## 2018-11-16 LAB — TSH SERPL-ACNC: 1.26 UIU/ML

## 2018-11-30 ENCOUNTER — MEDICATION RENEWAL (OUTPATIENT)
Age: 73
End: 2018-11-30

## 2018-11-30 RX ORDER — AZITHROMYCIN 250 MG/1
250 TABLET, FILM COATED ORAL
Qty: 6 | Refills: 1 | Status: ACTIVE | COMMUNITY
Start: 2018-01-03 | End: 1900-01-01

## 2019-01-24 NOTE — PHYSICAL THERAPY INITIAL EVALUATION ADULT - TRANSFER TRAINING, PT EVAL
Patient Specific Counseling (Will Not Stick From Patient To Patient): Counseled patient on the immune mediated and overall benign nature of the condition. Discussed likely prognosis that this may burn itself out in a few years Detail Level: Detailed Patient will transfer sit-->stand with SUP in 5 sessions.

## 2019-02-13 ENCOUNTER — MEDICATION RENEWAL (OUTPATIENT)
Age: 74
End: 2019-02-13

## 2019-02-13 DIAGNOSIS — Z87.440 PERSONAL HISTORY OF URINARY (TRACT) INFECTIONS: ICD-10-CM

## 2019-02-26 ENCOUNTER — MEDICATION RENEWAL (OUTPATIENT)
Age: 74
End: 2019-02-26

## 2019-02-26 ENCOUNTER — RX RENEWAL (OUTPATIENT)
Age: 74
End: 2019-02-26

## 2019-03-22 ENCOUNTER — APPOINTMENT (OUTPATIENT)
Dept: CARDIOLOGY | Facility: CLINIC | Age: 74
End: 2019-03-22
Payer: MEDICARE

## 2019-03-22 ENCOUNTER — NON-APPOINTMENT (OUTPATIENT)
Age: 74
End: 2019-03-22

## 2019-03-22 VITALS
WEIGHT: 169 LBS | HEART RATE: 95 BPM | OXYGEN SATURATION: 95 % | SYSTOLIC BLOOD PRESSURE: 170 MMHG | HEIGHT: 66 IN | BODY MASS INDEX: 27.16 KG/M2 | TEMPERATURE: 97.6 F | DIASTOLIC BLOOD PRESSURE: 80 MMHG

## 2019-03-22 VITALS — SYSTOLIC BLOOD PRESSURE: 150 MMHG | HEART RATE: 80 BPM | DIASTOLIC BLOOD PRESSURE: 95 MMHG

## 2019-03-22 PROCEDURE — 36415 COLL VENOUS BLD VENIPUNCTURE: CPT

## 2019-03-22 PROCEDURE — 93000 ELECTROCARDIOGRAM COMPLETE: CPT

## 2019-03-22 PROCEDURE — 99214 OFFICE O/P EST MOD 30 MIN: CPT

## 2019-03-22 NOTE — HISTORY OF PRESENT ILLNESS
[FreeTextEntry1] : 2013 The patient had no previous cardiac history. She was seen 4 years prior at [facility] as preop for leg surgery after a fracture. She did tell the cardiologist about on-and-off palpitations. He found absolutely nothing and the workup was negative although she is not sure if she had a stress test or an echo at that time. She doesn't see doctors very often other than a pulmonologist for emphysema and a gastroenterologist for irritable bowel syndrome. She denied a history of hypertension, diabetes, hyperlipidemia and there was no family history of coronary artery disease. However she is smoking a pack a day. Complicating the issue is also a long history of panic attacks and anxiety that has kept her from doctor's visits and hospitalizations. She also been under a tremendous amount of stress recently due to a situation involving her daughter who lives [facility] and her expectation that her parents become full-time babysitters for them and move up there after an unexpected pregnancy.\par On October 23, 2013 she was having an endoscopy and she was found to be in rapid atrial fibrillation and sent to the emergency room. I saw her there and she achieved rate control with beta blockade, ruled out for myocardial infarction. An echocardiogram done the next morning was somewhat suboptimal despite IV echo contrast but overall LV function was mildly globally diminished with an ejection fraction by fractional shortening of 50% and a severely dilated left atrium. Obviously it is unclear how long she has been in the atrial fibrillation although she had seen an internist, Dr. Candido Barragan and has seen Dr. Beverly of pulmonary and Dr. Holt of GI and nobody had noted a rapid or irregular pulse. Due to her anxiety she was discharged the following morning on bisoprolol 10 mg for rate control,  Xarelto 20 mg for anticoagulation, her baseline Lexapro along with Xanax for anxiety. \par October 31, 2013. Here for outpatient followup. She has been feeling fine and denies chest pain or shortness of breath but still feels a lot of distress and is still in atrial fibrillation. Her heart rate was still somewhat rapid although she was asymptomatic. She was still smoking as well. I increased her beta blocker and discussed stopping smoking. She was told to come back in 2 weeks and then at that point we could start to think about cardioversion medically or electrically although she would need coronary artery disease ruled out first.\par November 13, 2013. She is here for followup on the higher dose of beta blocker. She feels perfectly fine and has no symptoms whatsoever. She is active. She has gained 1 pound. She was doing well and was scheduled for a stress echocardiogram. This was done on November 26 and in fact she was found to have a significantly dilated cardiomyopathy with moderate global dysfunction. There was no evidence of ischemia although there was poor visualization of the endocardium. The heart rate response was excessive still. Based on this I changed her to carvedilol 25 mg b.i.d. and added ramipril 5 q.d.\par December 17, 2013. The patient is here for followup. Her main issue is that after she takes her morning medicines she gets severely nauseous. The only medicine that fits the bill and is not in the afternoon is the Altace. Otherwise she has been doing fairly well with maybe some mild orthostasis. She had one episode on a Sunday where for about 45 seconds she felt flushed, lightheaded et cetera. This has not recurred. She knows when she gets her panic attacks her heart rate does go up but otherwise she thinks her heart rate has been better. I elected to stop her Altace and see if her nauseousness went away. I also decided to set her up for 3 weeks of CardioNet monitoring to rule out ventricular arrhythmias and to better assess her heart rate control in atrial fibrillation. \par January 22, 2014. The patient completed the CardioNet about a week or 2 ago. She had absolutely no ventricular tachycardia or even any significant number of VPCs. Her atrial fibrillation rate on the average was between 80 and 90 which is acceptable. They were times when she went well over 100. She is feeling fairly well and only notices a limitation in terms of shortness of breath when it is very cold outside. In the house and even going up stairs she has no symptoms. There has been no syncope or near-syncope and no nauseousness. Her panic attacks have been better and she is seeing her psychiatrist tomorrow. She is still struggling a lot with trying to stop smoking. We elected to give her 2 months more time and then repeat the echo and see if the LV function has improved with heart rate control. If not, we would then consider retrying an ACE or adding an ARB and setting her up for a defibrillator.\par March 18, 2014. The patient returns for followup and echocardiogram. She has occasional loose bowel movements and he did take Imodium on a twice a week. She denies exertional chest pain or shortness of breath or palpitations. She has been extremely anxious the last month about this appointment in terms of whether or not she needs a defibrillator. She would like to hold off unless it is actually necessary. In addition to a mixup with the schedule she had a very long wait in the office this morning and then on top of her usual anxiety in doctor's offices has caused her heart rate to be rather fast again. Her echo seemed even more suboptimal this time and had at least moderate systolic dysfunction. I elected to put her on Diovan 80 mg and have her come back in 2 months. Next is evaluation of LV function should be MUGA or MRI. The patient was urged to stop smoking.\par May 19, 2014. The patient is here for followup. Her breathing is worse with she and her son are attributing to the carvedilol and would like her switch to a cardioselective beta blocker. Not clear if she has any wheezing. In addition she has runny nose and dry mouth but she says that predated the Diovan. She is still anxious and has had panic attacks. She says she is trying to cut down on the smoking but hasn't had much success yet. There is no PND orthopnea or pedal edema.  She was convinced that her Coreg was causing shortness of breath because of her lung disease and so we switched her to bisoprolol. She felt "100 times better" however her labs showed abnormal LFTs which will be followed up next visit.\par June 16, 2014. The patient is here for followup. She initially remains tachycardic with atrial fibrillation. She still feels wonderful and all her family think she is doing wonderfully. She was convinced that she is back to normal and that her heart rate and blood pressure only elevated because she is coming to the doctor. Her LFTs were much better. In July she had gross hematuria which was caused by a UTI. Her Xarelto was held for one day and Bactrim started and asked her UTI resolved, the hematuria resolved and she went back on Xarelto.\par October 7, 2014. The patient is here for followup.  Once again she is tachycardic and hypertensive. Again she claims she has been feeling perfectly fine when she is not the office. Unfortunately she is still smoking. She has not gained weight no developed any pedal edema. She has not had any change in her exercise capacity although she admits she has become a couch potato. The labs were acceptable other than a nonfasting glucose of 133 and her SGPT came down to 73 with the SGOT normalized. Her  said he would buy an inexpensive stethoscope and follow her heart rate on his own.\par November 3, 2014. The patient called that for the last 2 weeks her heartbeat ranged from 74-98 with a mean of 84 and a median of 85. Based on that we will not change her medications. She is thinking of stopping the Lexapro as it was only given to help her stop smoking and has not helped. She said she will work on the smoking.\par January 19, 2015. Patient is here for followup. She is still in atrial fibrillation with the initial EKG having a heart rate greater than 150. After her blood work was done in the medical assistant repeated her vital signs her heart rate was down to 91 she is always nervous when she comes here that at some point we will call the ambulance and sent her to the hospital. Just recently she accompanied her sister to the hospital with possibly an abnormal heart rhythm and now the sister is scheduled for valve replacement. Patient is still smoking otherwise she feels well. We discussed her chronic anxiety disorder. She's not sure if she was doing better on Lexapro or not. She is taking Xanax 0.25 mg nightly. No complaints of chest pain shortness of breath syncope or near syncope. No complaints of palpitations. She has also developed a mild form of Raynaud's disease. In the cold her fingers get somewhat numb and turn a little white but they are not painful. We discussed the possibility of this being from her beta blocker but so far it is mild and tolerable. On further bursting she admits she gets more fatigue and maybe more short of breath than usual with exertion which he now blames on the couch potato orthopedic continued smoking.\par June 16, 2016. The patient is here for followup along with echocardiogram. She still has a pretty rapid heartbeat and atrial fibrillation and the echo is suboptimal but still shows significant LV dysfunction. Symptomatically she is fine. She felt that her Lexapro was increasing her heart rate so she stopped it and would like to try Wellbutrin. She is still smoking. She has been under a lot of stress as they sold their house and are moving to [place], but she would still like to come here to 3 times a year for followup. She had some issues with an ear infection that she got from her grandchildren and has no pain now but feels as if it is clogged on the left.  Again we had a long discussion about true heart rate controlled, true LVEF, and whether she is a candidate for defibrillator. We agreed to try increasing her bisoprolol to 15 mg and watch for both bronchospasm and Raynaud's, as well as at some point getting a nuclear MUGA scan to get a more accurate ejection fraction and then determine if she needs an AICD.\par October 27, 2015. The patient is here for followup. In the interim she had mild breakdown when she was unable to sell her house and move and is now on Celexa. Otherwise she has felt fine. She does get fatigued very easily. Denies shortness of breath. She had a MUGA scan at the [facility] and it showed ejection fraction 48% with global hypokinesis. He had initial EKG shows a heart rate of 103 which for her is pretty low. She thinks it's because I told her already she did not need a defibrillator. She also had a colonoscopy in the interim and some polyps were found.\par February 25, 2016. Patient is here for followup. On the whole doing well although she is nervous about her upcoming vacation ([place], [place]) because she is agoraphobic. She does feel some dyspnea on exertion and limited tolerance but no different than the past year or 2. No other complaints, no change in medications, no interval medical issues hospitalizations et cetera. After exam, I increased the valsartan 80 mg to 160 mg.\par July 11, 2016. Patient is here in followup. She is in good spirits, feels wonderful with no complaints. She is continuing to babysit her grandchild during the week. She had no problems with increasing the valsartan, including no worsening of her baseline allergic rhinorrhea. She thinks it might be from Celexa, and she cut back to 10 mg, but no change so far.\par November 14, 2016. Patient is seen in followup. Good spirits and more relaxed. Does admit she is more tired than usual, which he thinks is just because she is retired and not doing anything. She does admit to more shortness of breath going upstairs. She is still smoking. No chest pain. No change in medication or other medical interval issues.\par February 10, 2017. The patient is here in followup. Around January 12 the patient was hospitalized with influenza and rapid atrial fibrillation and then transferred to rehabilitation at [facility]. She was discharged one week ago and is now on Cardizem  mg, metoprolol 50 t.i.d., and digoxin 0.125. She is off bisoprolol and valsartan. She is still on Xarelto 20. She has a wrist blood pressure monitor and her blood pressures at home have been /53-65 and all asymptomatic and her heart rates have been between 60 and 80 but it is just measuring her peripheral pulse by her wrist. She was instructed not to take metoprolol if her blood pressure was below 110 so on the average she is only taking 50 mg once a day. She is here today and her monitor reads a heart rate of 86, while her EKG and exam showed a heart rate between 120 and 130. The blood pressure was relatively accurate although I got a little bit higher blood pressure when I checked, in the 150 range. She has had only one episode of lightheadedness and that is getting up and walking after a long car ride. Unfortunately, she did not take out her blood pressure monitor and check it then. The rest of her exam was unremarkable and the EKG just has some mild ST depressions as in the past. After long discussion I had her take her metoprolol 50 mg q.12 h., no change in the other medications, not to check her blood pressure, pulse, unless she has another dizzy episode and she will keep her upcoming appointment in one month\par 3/21/2017. Patient returns in followup. She is now on metoprolol 50 b.i.d. She is still not smoking. She has been using Advair and thinks she has been feeling better, but she has not seen pulmonary on the outside. She had seen Dr. Beverly many years ago, and she liked him but the office was very hectic. She has not been checking her blood pressure on her own. Here in the office her blood pressure was excellent and her heart rate was in the 80s.\par July 25, 2017. Patient has 3 hours away, but wants to keep coming here for followup. Has not smoked in 6 months and 14 days. Not using any inhalers because she thought they were affecting her vision and she seems to be doing fine although cannot walk very far without getting short of breath. Heart rate control seems much better with digoxin. No interval hospitalizations or medical issues.\par December 1, 2017. Patient is here in followup. She is unhappy with her move as they got a dog that she can't stand but her  likes and she is a little overwhelmed by her grandchildren being just 3 minutes away. The family is concerned as the patient stayed inside for 8 consecutive days and did not want to go out. She does have a psychiatrist that both her  and she think is crazy. She is on Lexapro 20 mg. In addition, she has some orthostatic symptoms. She looks a little pale, but denies any melena or change in bowel habits or appetite. She does not seem volume depleted. She is on Cardizem  mg daily, digoxin, 0.125, and metoprolol 50 every 8 hours, but only takes it twice a day. Labs were sent, and I am changing her to metoprolol , which she will take at night while she leaves the Cardizem CD for the morning.  Labs came back the next day with a hemoglobin of 6.4. Discussed with patient the and urged that she go to the hospital or at least to a gastroenterologist.\par December 7, 2017. Had a GI workup. No active bleeding. One lesion in the rectum that seemed to be the culprit was cauterized and a polyp removed. She was admitted to the hospital initially and transfused 2 units. She was told now to come back for colonoscopy in 5 years.\par March 12, 2018. Patient here in followup. As far as she can tell, no recurrence of GI bleeding. She is on iron twice a day, so her stool is still black. She is doing well overall although was not happy with her psychiatrist and on her own cut back the Lexapro from 20 mg to 10 mg daily; and no difference so far. Heart rate seems controlled on her current regimen, which does include digoxin. She remains anticoagulated on Xarelto\par July 17, 2018. Patient here in followup. No further GI bleeding. Off her iron. No complaints. Reviewed all her medications. Stopped her inhalers as she did not think they were helping.\par November 14, 2018. Patient here in followup.Remains in atrial fibrillation with a ventricular rate of 83, and ST changes, consistent with digoxin. Blood pressure and heart rate excellent despite being here in the office and despite the long drive with traffic from [place]. Not using any inhalers and also not seeing pulmonary. Does have chronic shortness of breath. O2 sat today 97%\par March 22, 2019. Patient here in followup. In February had a UTI with gross hematuria that seemed to respond to Bactrim DS. Here she remains in atrial fibrillation with a ventricular rate of 80 and nonspecific ST changes on digoxin. No complaints except stuck in traffic on the way here. Weight is down 9 pounds, but she has cut back on munching on snacks such as potato chips pretzels etc.\par

## 2019-03-22 NOTE — REVIEW OF SYSTEMS
[Recent Weight Gain (___ Lbs)] : no recent weight gain [Feeling Fatigued] : not feeling fatigued [Recent Weight Loss (___ Lbs)] : recent [unfilled] ~Ulb weight loss [Sinus Pressure] : no sinus pressure [Shortness Of Breath] : no shortness of breath [Dyspnea on exertion] : dyspnea during exertion [Chest Pain] : no chest pain [Lower Ext Edema] : no extremity edema [Palpitations] : no palpitations [Cough] : no cough [Wheezing] : no wheezing [Nausea] : no nausea [Change in Appetite] : no change in appetite [Change In The Stool] : no change in stool [Dysphagia] : no dysphagia [Change In Color Of Skin] : change in skin color [Dizziness] : no dizziness [see HPI] : see HPI [Depression] : depression [Anxiety] : no anxiety [Under Stress] : not under stress [Suicidal] : not suicidal [Negative] : Heme/Lymph

## 2019-03-25 LAB
ALBUMIN SERPL ELPH-MCNC: 4.4 G/DL
ALP BLD-CCNC: 133 U/L
ALT SERPL-CCNC: 19 U/L
ANION GAP SERPL CALC-SCNC: 15 MMOL/L
AST SERPL-CCNC: 20 U/L
BASOPHILS # BLD AUTO: 0.03 K/UL
BASOPHILS NFR BLD AUTO: 0.4 %
BILIRUB SERPL-MCNC: 0.3 MG/DL
BUN SERPL-MCNC: 13 MG/DL
CALCIUM SERPL-MCNC: 10.1 MG/DL
CHLORIDE SERPL-SCNC: 102 MMOL/L
CHOLEST SERPL-MCNC: 172 MG/DL
CHOLEST/HDLC SERPL: 3.6 RATIO
CO2 SERPL-SCNC: 25 MMOL/L
CREAT SERPL-MCNC: 0.68 MG/DL
DIGOXIN SERPL-MCNC: 0.6 NG/ML
EOSINOPHIL # BLD AUTO: 0.06 K/UL
EOSINOPHIL NFR BLD AUTO: 0.7 %
ESTIMATED AVERAGE GLUCOSE: 120 MG/DL
GLUCOSE SERPL-MCNC: 107 MG/DL
HBA1C MFR BLD HPLC: 5.8 %
HCT VFR BLD CALC: 51.5 %
HDLC SERPL-MCNC: 48 MG/DL
HGB BLD-MCNC: 16.6 G/DL
IMM GRANULOCYTES NFR BLD AUTO: 0.6 %
LDLC SERPL CALC-MCNC: 92 MG/DL
LYMPHOCYTES # BLD AUTO: 2.5 K/UL
LYMPHOCYTES NFR BLD AUTO: 30.9 %
MAN DIFF?: NORMAL
MCHC RBC-ENTMCNC: 31.4 PG
MCHC RBC-ENTMCNC: 32.2 GM/DL
MCV RBC AUTO: 97.5 FL
MONOCYTES # BLD AUTO: 0.61 K/UL
MONOCYTES NFR BLD AUTO: 7.5 %
NEUTROPHILS # BLD AUTO: 4.85 K/UL
NEUTROPHILS NFR BLD AUTO: 59.9 %
NT-PROBNP SERPL-MCNC: 955 PG/ML
PLATELET # BLD AUTO: 266 K/UL
POTASSIUM SERPL-SCNC: 4.2 MMOL/L
PROT SERPL-MCNC: 7.2 G/DL
RBC # BLD: 5.28 M/UL
RBC # FLD: 12.8 %
SODIUM SERPL-SCNC: 142 MMOL/L
TRIGL SERPL-MCNC: 159 MG/DL
TSH SERPL-ACNC: 1.22 UIU/ML
WBC # FLD AUTO: 8.1 K/UL

## 2019-05-07 ENCOUNTER — RX RENEWAL (OUTPATIENT)
Age: 74
End: 2019-05-07

## 2019-05-23 ENCOUNTER — RX RENEWAL (OUTPATIENT)
Age: 74
End: 2019-05-23

## 2019-05-28 ENCOUNTER — MEDICATION RENEWAL (OUTPATIENT)
Age: 74
End: 2019-05-28

## 2019-05-28 RX ORDER — ALPRAZOLAM 0.5 MG/1
0.5 TABLET ORAL
Qty: 90 | Refills: 0 | Status: ACTIVE | COMMUNITY
Start: 2019-02-26 | End: 1900-01-01

## 2019-07-07 ENCOUNTER — RX RENEWAL (OUTPATIENT)
Age: 74
End: 2019-07-07

## 2019-08-13 ENCOUNTER — NON-APPOINTMENT (OUTPATIENT)
Age: 74
End: 2019-08-13

## 2019-08-13 ENCOUNTER — APPOINTMENT (OUTPATIENT)
Dept: CARDIOLOGY | Facility: CLINIC | Age: 74
End: 2019-08-13
Payer: MEDICARE

## 2019-08-13 VITALS
TEMPERATURE: 97.9 F | BODY MASS INDEX: 28.28 KG/M2 | DIASTOLIC BLOOD PRESSURE: 79 MMHG | HEART RATE: 85 BPM | OXYGEN SATURATION: 94 % | HEIGHT: 66 IN | WEIGHT: 176 LBS | SYSTOLIC BLOOD PRESSURE: 151 MMHG

## 2019-08-13 PROCEDURE — 36415 COLL VENOUS BLD VENIPUNCTURE: CPT

## 2019-08-13 PROCEDURE — 93000 ELECTROCARDIOGRAM COMPLETE: CPT

## 2019-08-13 PROCEDURE — 99214 OFFICE O/P EST MOD 30 MIN: CPT

## 2019-08-13 NOTE — REVIEW OF SYSTEMS
[Recent Weight Gain (___ Lbs)] : no recent weight gain [Feeling Fatigued] : not feeling fatigued [Recent Weight Loss (___ Lbs)] : no recent weight loss [Sinus Pressure] : no sinus pressure [Shortness Of Breath] : no shortness of breath [Dyspnea on exertion] : dyspnea during exertion [Chest Pain] : no chest pain [Lower Ext Edema] : no extremity edema [Palpitations] : no palpitations [Cough] : no cough [Wheezing] : no wheezing [Nausea] : no nausea [Change in Appetite] : no change in appetite [Change In The Stool] : no change in stool [Dysphagia] : no dysphagia [Change In Color Of Skin] : change in skin color [Dizziness] : no dizziness [see HPI] : see HPI [Depression] : no depression [Anxiety] : no anxiety [Under Stress] : not under stress [Suicidal] : not suicidal [Negative] : Heme/Lymph

## 2019-08-13 NOTE — HISTORY OF PRESENT ILLNESS
[FreeTextEntry1] : 2013 The patient had no previous cardiac history. She was seen 4 years prior at [facility] as preop for leg surgery after a fracture. She did tell the cardiologist about on-and-off palpitations. He found absolutely nothing and the workup was negative although she is not sure if she had a stress test or an echo at that time. She doesn't see doctors very often other than a pulmonologist for emphysema and a gastroenterologist for irritable bowel syndrome. She denied a history of hypertension, diabetes, hyperlipidemia and there was no family history of coronary artery disease. However she is smoking a pack a day. Complicating the issue is also a long history of panic attacks and anxiety that has kept her from doctor's visits and hospitalizations. She also been under a tremendous amount of stress recently due to a situation involving her daughter who lives [facility] and her expectation that her parents become full-time babysitters for them and move up there after an unexpected pregnancy.\par On October 23, 2013 she was having an endoscopy and she was found to be in rapid atrial fibrillation and sent to the emergency room. I saw her there and she achieved rate control with beta blockade, ruled out for myocardial infarction. An echocardiogram done the next morning was somewhat suboptimal despite IV echo contrast but overall LV function was mildly globally diminished with an ejection fraction by fractional shortening of 50% and a severely dilated left atrium. Obviously it is unclear how long she has been in the atrial fibrillation although she had seen an internist, Dr. Candido Barragan and has seen Dr. Beverly of pulmonary and Dr. Holt of GI and nobody had noted a rapid or irregular pulse. Due to her anxiety she was discharged the following morning on bisoprolol 10 mg for rate control,  Xarelto 20 mg for anticoagulation, her baseline Lexapro along with Xanax for anxiety. \par October 31, 2013. Here for outpatient followup. She has been feeling fine and denies chest pain or shortness of breath but still feels a lot of distress and is still in atrial fibrillation. Her heart rate was still somewhat rapid although she was asymptomatic. She was still smoking as well. I increased her beta blocker and discussed stopping smoking. She was told to come back in 2 weeks and then at that point we could start to think about cardioversion medically or electrically although she would need coronary artery disease ruled out first.\par November 13, 2013. She is here for followup on the higher dose of beta blocker. She feels perfectly fine and has no symptoms whatsoever. She is active. She has gained 1 pound. She was doing well and was scheduled for a stress echocardiogram. This was done on November 26 and in fact she was found to have a significantly dilated cardiomyopathy with moderate global dysfunction. There was no evidence of ischemia although there was poor visualization of the endocardium. The heart rate response was excessive still. Based on this I changed her to carvedilol 25 mg b.i.d. and added ramipril 5 q.d.\par December 17, 2013. The patient is here for followup. Her main issue is that after she takes her morning medicines she gets severely nauseous. The only medicine that fits the bill and is not in the afternoon is the Altace. Otherwise she has been doing fairly well with maybe some mild orthostasis. She had one episode on a Sunday where for about 45 seconds she felt flushed, lightheaded et cetera. This has not recurred. She knows when she gets her panic attacks her heart rate does go up but otherwise she thinks her heart rate has been better. I elected to stop her Altace and see if her nauseousness went away. I also decided to set her up for 3 weeks of CardioNet monitoring to rule out ventricular arrhythmias and to better assess her heart rate control in atrial fibrillation. \par January 22, 2014. The patient completed the CardioNet about a week or 2 ago. She had absolutely no ventricular tachycardia or even any significant number of VPCs. Her atrial fibrillation rate on the average was between 80 and 90 which is acceptable. They were times when she went well over 100. She is feeling fairly well and only notices a limitation in terms of shortness of breath when it is very cold outside. In the house and even going up stairs she has no symptoms. There has been no syncope or near-syncope and no nauseousness. Her panic attacks have been better and she is seeing her psychiatrist tomorrow. She is still struggling a lot with trying to stop smoking. We elected to give her 2 months more time and then repeat the echo and see if the LV function has improved with heart rate control. If not, we would then consider retrying an ACE or adding an ARB and setting her up for a defibrillator.\par March 18, 2014. The patient returns for followup and echocardiogram. She has occasional loose bowel movements and he did take Imodium on a twice a week. She denies exertional chest pain or shortness of breath or palpitations. She has been extremely anxious the last month about this appointment in terms of whether or not she needs a defibrillator. She would like to hold off unless it is actually necessary. In addition to a mixup with the schedule she had a very long wait in the office this morning and then on top of her usual anxiety in doctor's offices has caused her heart rate to be rather fast again. Her echo seemed even more suboptimal this time and had at least moderate systolic dysfunction. I elected to put her on Diovan 80 mg and have her come back in 2 months. Next is evaluation of LV function should be MUGA or MRI. The patient was urged to stop smoking.\par May 19, 2014. The patient is here for followup. Her breathing is worse with she and her son are attributing to the carvedilol and would like her switch to a cardioselective beta blocker. Not clear if she has any wheezing. In addition she has runny nose and dry mouth but she says that predated the Diovan. She is still anxious and has had panic attacks. She says she is trying to cut down on the smoking but hasn't had much success yet. There is no PND orthopnea or pedal edema.  She was convinced that her Coreg was causing shortness of breath because of her lung disease and so we switched her to bisoprolol. She felt "100 times better" however her labs showed abnormal LFTs which will be followed up next visit.\par June 16, 2014. The patient is here for followup. She initially remains tachycardic with atrial fibrillation. She still feels wonderful and all her family think she is doing wonderfully. She was convinced that she is back to normal and that her heart rate and blood pressure only elevated because she is coming to the doctor. Her LFTs were much better. In July she had gross hematuria which was caused by a UTI. Her Xarelto was held for one day and Bactrim started and asked her UTI resolved, the hematuria resolved and she went back on Xarelto.\par October 7, 2014. The patient is here for followup.  Once again she is tachycardic and hypertensive. Again she claims she has been feeling perfectly fine when she is not the office. Unfortunately she is still smoking. She has not gained weight no developed any pedal edema. She has not had any change in her exercise capacity although she admits she has become a couch potato. The labs were acceptable other than a nonfasting glucose of 133 and her SGPT came down to 73 with the SGOT normalized. Her  said he would buy an inexpensive stethoscope and follow her heart rate on his own.\par November 3, 2014. The patient called that for the last 2 weeks her heartbeat ranged from 74-98 with a mean of 84 and a median of 85. Based on that we will not change her medications. She is thinking of stopping the Lexapro as it was only given to help her stop smoking and has not helped. She said she will work on the smoking.\par January 19, 2015. Patient is here for followup. She is still in atrial fibrillation with the initial EKG having a heart rate greater than 150. After her blood work was done in the medical assistant repeated her vital signs her heart rate was down to 91 she is always nervous when she comes here that at some point we will call the ambulance and sent her to the hospital. Just recently she accompanied her sister to the hospital with possibly an abnormal heart rhythm and now the sister is scheduled for valve replacement. Patient is still smoking otherwise she feels well. We discussed her chronic anxiety disorder. She's not sure if she was doing better on Lexapro or not. She is taking Xanax 0.25 mg nightly. No complaints of chest pain shortness of breath syncope or near syncope. No complaints of palpitations. She has also developed a mild form of Raynaud's disease. In the cold her fingers get somewhat numb and turn a little white but they are not painful. We discussed the possibility of this being from her beta blocker but so far it is mild and tolerable. On further bursting she admits she gets more fatigue and maybe more short of breath than usual with exertion which he now blames on the couch potato orthopedic continued smoking.\par June 16, 2016. The patient is here for followup along with echocardiogram. She still has a pretty rapid heartbeat and atrial fibrillation and the echo is suboptimal but still shows significant LV dysfunction. Symptomatically she is fine. She felt that her Lexapro was increasing her heart rate so she stopped it and would like to try Wellbutrin. She is still smoking. She has been under a lot of stress as they sold their house and are moving to [place], but she would still like to come here to 3 times a year for followup. She had some issues with an ear infection that she got from her grandchildren and has no pain now but feels as if it is clogged on the left.  Again we had a long discussion about true heart rate controlled, true LVEF, and whether she is a candidate for defibrillator. We agreed to try increasing her bisoprolol to 15 mg and watch for both bronchospasm and Raynaud's, as well as at some point getting a nuclear MUGA scan to get a more accurate ejection fraction and then determine if she needs an AICD.\par October 27, 2015. The patient is here for followup. In the interim she had mild breakdown when she was unable to sell her house and move and is now on Celexa. Otherwise she has felt fine. She does get fatigued very easily. Denies shortness of breath. She had a MUGA scan at the [facility] and it showed ejection fraction 48% with global hypokinesis. He had initial EKG shows a heart rate of 103 which for her is pretty low. She thinks it's because I told her already she did not need a defibrillator. She also had a colonoscopy in the interim and some polyps were found.\par February 25, 2016. Patient is here for followup. On the whole doing well although she is nervous about her upcoming vacation ([place], [place]) because she is agoraphobic. She does feel some dyspnea on exertion and limited tolerance but no different than the past year or 2. No other complaints, no change in medications, no interval medical issues hospitalizations et cetera. After exam, I increased the valsartan 80 mg to 160 mg.\par July 11, 2016. Patient is here in followup. She is in good spirits, feels wonderful with no complaints. She is continuing to babysit her grandchild during the week. She had no problems with increasing the valsartan, including no worsening of her baseline allergic rhinorrhea. She thinks it might be from Celexa, and she cut back to 10 mg, but no change so far.\par November 14, 2016. Patient is seen in followup. Good spirits and more relaxed. Does admit she is more tired than usual, which he thinks is just because she is retired and not doing anything. She does admit to more shortness of breath going upstairs. She is still smoking. No chest pain. No change in medication or other medical interval issues.\par February 10, 2017. The patient is here in followup. Around January 12 the patient was hospitalized with influenza and rapid atrial fibrillation and then transferred to rehabilitation at [facility]. She was discharged one week ago and is now on Cardizem  mg, metoprolol 50 t.i.d., and digoxin 0.125. She is off bisoprolol and valsartan. She is still on Xarelto 20. She has a wrist blood pressure monitor and her blood pressures at home have been /53-65 and all asymptomatic and her heart rates have been between 60 and 80 but it is just measuring her peripheral pulse by her wrist. She was instructed not to take metoprolol if her blood pressure was below 110 so on the average she is only taking 50 mg once a day. She is here today and her monitor reads a heart rate of 86, while her EKG and exam showed a heart rate between 120 and 130. The blood pressure was relatively accurate although I got a little bit higher blood pressure when I checked, in the 150 range. She has had only one episode of lightheadedness and that is getting up and walking after a long car ride. Unfortunately, she did not take out her blood pressure monitor and check it then. The rest of her exam was unremarkable and the EKG just has some mild ST depressions as in the past. After long discussion I had her take her metoprolol 50 mg q.12 h., no change in the other medications, not to check her blood pressure, pulse, unless she has another dizzy episode and she will keep her upcoming appointment in one month\par 3/21/2017. Patient returns in followup. She is now on metoprolol 50 b.i.d. She is still not smoking. She has been using Advair and thinks she has been feeling better, but she has not seen pulmonary on the outside. She had seen Dr. Beverly many years ago, and she liked him but the office was very hectic. She has not been checking her blood pressure on her own. Here in the office her blood pressure was excellent and her heart rate was in the 80s.\par July 25, 2017. Patient has 3 hours away, but wants to keep coming here for followup. Has not smoked in 6 months and 14 days. Not using any inhalers because she thought they were affecting her vision and she seems to be doing fine although cannot walk very far without getting short of breath. Heart rate control seems much better with digoxin. No interval hospitalizations or medical issues.\par December 1, 2017. Patient is here in followup. She is unhappy with her move as they got a dog that she can't stand but her  likes and she is a little overwhelmed by her grandchildren being just 3 minutes away. The family is concerned as the patient stayed inside for 8 consecutive days and did not want to go out. She does have a psychiatrist that both her  and she think is crazy. She is on Lexapro 20 mg. In addition, she has some orthostatic symptoms. She looks a little pale, but denies any melena or change in bowel habits or appetite. She does not seem volume depleted. She is on Cardizem  mg daily, digoxin, 0.125, and metoprolol 50 every 8 hours, but only takes it twice a day. Labs were sent, and I am changing her to metoprolol , which she will take at night while she leaves the Cardizem CD for the morning.  Labs came back the next day with a hemoglobin of 6.4. Discussed with patient the and urged that she go to the hospital or at least to a gastroenterologist.\par December 7, 2017. Had a GI workup. No active bleeding. One lesion in the rectum that seemed to be the culprit was cauterized and a polyp removed. She was admitted to the hospital initially and transfused 2 units. She was told now to come back for colonoscopy in 5 years.\par March 12, 2018. Patient here in followup. As far as she can tell, no recurrence of GI bleeding. She is on iron twice a day, so her stool is still black. She is doing well overall although was not happy with her psychiatrist and on her own cut back the Lexapro from 20 mg to 10 mg daily; and no difference so far. Heart rate seems controlled on her current regimen, which does include digoxin. She remains anticoagulated on Xarelto\par July 17, 2018. Patient here in followup. No further GI bleeding. Off her iron. No complaints. Reviewed all her medications. Stopped her inhalers as she did not think they were helping.\par November 14, 2018. Patient here in followup.Remains in atrial fibrillation with a ventricular rate of 83, and ST changes, consistent with digoxin. Blood pressure and heart rate excellent despite being here in the office and despite the long drive with traffic from [place]. Not using any inhalers and also not seeing pulmonary. Does have chronic shortness of breath. O2 sat today 97%\par March 22, 2019. Patient here in followup. In February had a UTI with gross hematuria that seemed to respond to Bactrim DS. Here she remains in atrial fibrillation with a ventricular rate of 80 and nonspecific ST changes on digoxin. No complaints except stuck in traffic on the way here. Weight is down 9 pounds, but she has cut back on munching on snacks such as potato chips pretzels etc.\par August 13, 2019. Patient here in followup. With the humidity she notices a little more trouble breathing, but still does not want to see a pulmonologist or take chronic medications unless she is convinced they will alter the natural history of her disease. I explained I cannot answer that, she really needs to speak with her pulmonologist. No cardiac complaints and no change in any of her medications

## 2019-08-14 LAB
ALBUMIN SERPL ELPH-MCNC: 4.7 G/DL
ALP BLD-CCNC: 145 U/L
ALT SERPL-CCNC: 15 U/L
ANION GAP SERPL CALC-SCNC: 14 MMOL/L
AST SERPL-CCNC: 19 U/L
BASOPHILS # BLD AUTO: 0.03 K/UL
BASOPHILS NFR BLD AUTO: 0.4 %
BILIRUB SERPL-MCNC: 0.4 MG/DL
BUN SERPL-MCNC: 10 MG/DL
CALCIUM SERPL-MCNC: 9.8 MG/DL
CHLORIDE SERPL-SCNC: 100 MMOL/L
CHOLEST SERPL-MCNC: 182 MG/DL
CHOLEST/HDLC SERPL: 4 RATIO
CO2 SERPL-SCNC: 28 MMOL/L
CREAT SERPL-MCNC: 0.74 MG/DL
DIGOXIN SERPL-MCNC: 1.1 NG/ML
EOSINOPHIL # BLD AUTO: 0.03 K/UL
EOSINOPHIL NFR BLD AUTO: 0.4 %
ESTIMATED AVERAGE GLUCOSE: 123 MG/DL
GLUCOSE SERPL-MCNC: 91 MG/DL
HBA1C MFR BLD HPLC: 5.9 %
HCT VFR BLD CALC: 52.7 %
HDLC SERPL-MCNC: 46 MG/DL
HGB BLD-MCNC: 16.3 G/DL
IMM GRANULOCYTES NFR BLD AUTO: 0.6 %
LDLC SERPL CALC-MCNC: 105 MG/DL
LYMPHOCYTES # BLD AUTO: 1.74 K/UL
LYMPHOCYTES NFR BLD AUTO: 21.3 %
MAN DIFF?: NORMAL
MCHC RBC-ENTMCNC: 30 PG
MCHC RBC-ENTMCNC: 30.9 GM/DL
MCV RBC AUTO: 97.1 FL
MONOCYTES # BLD AUTO: 0.62 K/UL
MONOCYTES NFR BLD AUTO: 7.6 %
NEUTROPHILS # BLD AUTO: 5.7 K/UL
NEUTROPHILS NFR BLD AUTO: 69.7 %
NT-PROBNP SERPL-MCNC: 917 PG/ML
PLATELET # BLD AUTO: 247 K/UL
POTASSIUM SERPL-SCNC: 4.9 MMOL/L
PROT SERPL-MCNC: 7.5 G/DL
RBC # BLD: 5.43 M/UL
RBC # FLD: 13.1 %
SODIUM SERPL-SCNC: 142 MMOL/L
TRIGL SERPL-MCNC: 157 MG/DL
TSH SERPL-ACNC: 1.05 UIU/ML
WBC # FLD AUTO: 8.17 K/UL

## 2019-08-22 ENCOUNTER — RX RENEWAL (OUTPATIENT)
Age: 74
End: 2019-08-22

## 2019-09-11 ENCOUNTER — RX RENEWAL (OUTPATIENT)
Age: 74
End: 2019-09-11

## 2019-09-12 ENCOUNTER — RX RENEWAL (OUTPATIENT)
Age: 74
End: 2019-09-12

## 2019-10-22 ENCOUNTER — MEDICATION RENEWAL (OUTPATIENT)
Age: 74
End: 2019-10-22

## 2019-11-19 ENCOUNTER — RX RENEWAL (OUTPATIENT)
Age: 74
End: 2019-11-19

## 2019-11-22 ENCOUNTER — RX RENEWAL (OUTPATIENT)
Age: 74
End: 2019-11-22

## 2020-01-07 ENCOUNTER — RX RENEWAL (OUTPATIENT)
Age: 75
End: 2020-01-07

## 2020-02-13 ENCOUNTER — RX RENEWAL (OUTPATIENT)
Age: 75
End: 2020-02-13

## 2020-02-13 ENCOUNTER — NON-APPOINTMENT (OUTPATIENT)
Age: 75
End: 2020-02-13

## 2020-02-13 ENCOUNTER — APPOINTMENT (OUTPATIENT)
Dept: CARDIOLOGY | Facility: CLINIC | Age: 75
End: 2020-02-13
Payer: MEDICARE

## 2020-02-13 VITALS
HEIGHT: 66 IN | HEART RATE: 84 BPM | SYSTOLIC BLOOD PRESSURE: 173 MMHG | WEIGHT: 177 LBS | BODY MASS INDEX: 28.45 KG/M2 | DIASTOLIC BLOOD PRESSURE: 84 MMHG | OXYGEN SATURATION: 93 %

## 2020-02-13 VITALS — HEART RATE: 86 BPM | SYSTOLIC BLOOD PRESSURE: 140 MMHG | DIASTOLIC BLOOD PRESSURE: 70 MMHG

## 2020-02-13 DIAGNOSIS — I50.22 CHRONIC SYSTOLIC (CONGESTIVE) HEART FAILURE: ICD-10-CM

## 2020-02-13 PROCEDURE — 99214 OFFICE O/P EST MOD 30 MIN: CPT

## 2020-02-13 PROCEDURE — 93000 ELECTROCARDIOGRAM COMPLETE: CPT

## 2020-02-13 PROCEDURE — 36415 COLL VENOUS BLD VENIPUNCTURE: CPT

## 2020-02-13 NOTE — PHYSICAL EXAM
[General Appearance - Well Developed] : well developed [Normal Appearance] : normal appearance [Well Groomed] : well groomed [General Appearance - Well Nourished] : well nourished [No Deformities] : no deformities [General Appearance - In No Acute Distress] : no acute distress [Normal Conjunctiva] : the conjunctiva exhibited no abnormalities [Eyelids - No Xanthelasma] : the eyelids demonstrated no xanthelasmas [Normal Oral Mucosa] : normal oral mucosa [No Oral Pallor] : no oral pallor [No Oral Cyanosis] : no oral cyanosis [Normal Jugular Venous V Waves Present] : normal jugular venous V waves present [No Jugular Venous June A Waves] : no jugular venous june A waves [Exaggerated Use Of Accessory Muscles For Inspiration] : no accessory muscle use [Respiration, Rhythm And Depth] : normal respiratory rhythm and effort [Auscultation Breath Sounds / Voice Sounds] : lungs were clear to auscultation bilaterally [Heart Rate And Rhythm] : heart rate and rhythm were normal [Heart Sounds] : normal S1 and S2 [Murmurs] : no murmurs present [Abdomen Tenderness] : non-tender [Abdomen Soft] : soft [Abdomen Mass (___ Cm)] : no abdominal mass palpated [Abnormal Walk] : normal gait [Nail Clubbing] : no clubbing of the fingernails [Gait - Sufficient For Exercise Testing] : the gait was sufficient for exercise testing [Petechial Hemorrhages (___cm)] : no petechial hemorrhages [Cyanosis, Localized] : no localized cyanosis [] : no rash [No Skin Ulcers] : no skin ulcer [No Venous Stasis] : no venous stasis [Skin Lesions] : no skin lesions [No Xanthoma] : no  xanthoma was observed [Oriented To Time, Place, And Person] : oriented to person, place, and time [Affect] : the affect was normal [Mood] : the mood was normal [FreeTextEntry1] : Much less anxious, cheerful, not depressed

## 2020-02-13 NOTE — REVIEW OF SYSTEMS
[Recent Weight Gain (___ Lbs)] : recent [unfilled] ~Ulb weight gain [Dyspnea on exertion] : dyspnea during exertion [see HPI] : see HPI [Negative] : Heme/Lymph [Feeling Fatigued] : not feeling fatigued [Recent Weight Loss (___ Lbs)] : no recent weight loss [Sinus Pressure] : no sinus pressure [Shortness Of Breath] : no shortness of breath [Chest Pain] : no chest pain [Lower Ext Edema] : no extremity edema [Palpitations] : no palpitations [Cough] : no cough [Wheezing] : no wheezing [Nausea] : no nausea [Change In The Stool] : no change in stool [Change in Appetite] : no change in appetite [Dysphagia] : no dysphagia [Change In Color Of Skin] : change in skin color [Dizziness] : no dizziness [Depression] : no depression [Anxiety] : no anxiety [Under Stress] : not under stress [Suicidal] : not suicidal

## 2020-02-13 NOTE — HISTORY OF PRESENT ILLNESS
[FreeTextEntry1] :  The patient had no previous cardiac history. She was seen 4 years prior at [facility] as preop for leg surgery after a fracture. She did tell the cardiologist about on-and-off palpitations. He found absolutely nothing and the workup was negative although she is not sure if she had a stress test or an echo at that time. She doesn't see doctors very often other than a pulmonologist for emphysema and a gastroenterologist for irritable bowel syndrome. She denied a history of hypertension, diabetes, hyperlipidemia and there was no family history of coronary artery disease. However she is smoking a pack a day. Complicating the issue is also a long history of panic attacks and anxiety that has kept her from doctor's visits and hospitalizations. She also been under a tremendous amount of stress recently due to a situation involving her daughter who lives [facility] and her expectation that her parents become full-time babysitters for them and move up there after an unexpected pregnancy.\par On 2013 she was having an endoscopy and she was found to be in rapid atrial fibrillation and sent to the emergency room. I saw her there and she achieved rate control with beta blockade, ruled out for myocardial infarction. An echocardiogram done the next morning was somewhat suboptimal despite IV echo contrast but overall LV function was mildly globally diminished with an ejection fraction by fractional shortening of 50% and a severely dilated left atrium. Obviously it is unclear how long she has been in the atrial fibrillation although she had seen an internist, Dr. Candido Barragan and has seen Dr. Beverly of pulmonary and Dr. Holt of GI and nobody had noted a rapid or irregular pulse. Due to her anxiety she was discharged the following morning on bisoprolol 10 mg for rate control,  Xarelto 20 mg for anticoagulation, her baseline Lexapro along with Xanax for anxiety. \par 2013. Here for outpatient followup. She has been feeling fine and denies chest pain or shortness of breath but still feels a lot of distress and is still in atrial fibrillation. Her heart rate was still somewhat rapid although she was asymptomatic. She was still smoking as well. I increased her beta blocker and discussed stopping smoking. She was told to come back in 2 weeks and then at that point we could start to think about cardioversion medically or electrically although she would need coronary artery disease ruled out first.\par 2013. She is here for followup on the higher dose of beta blocker. She feels perfectly fine and has no symptoms whatsoever. She is active. She has gained 1 pound. She was doing well and was scheduled for a stress echocardiogram. This was done on  and in fact she was found to have a significantly dilated cardiomyopathy with moderate global dysfunction. There was no evidence of ischemia although there was poor visualization of the endocardium. The heart rate response was excessive still. Based on this I changed her to carvedilol 25 mg b.i.d. and added ramipril 5 q.d.\par 2013. The patient is here for followup. Her main issue is that after she takes her morning medicines she gets severely nauseous. The only medicine that fits the bill and is not in the afternoon is the Altace. Otherwise she has been doing fairly well with maybe some mild orthostasis. She had one episode on a  where for about 45 seconds she felt flushed, lightheaded et cetera. This has not recurred. She knows when she gets her panic attacks her heart rate does go up but otherwise she thinks her heart rate has been better. I elected to stop her Altace and see if her nauseousness went away. I also decided to set her up for 3 weeks of CardioNet monitoring to rule out ventricular arrhythmias and to better assess her heart rate control in atrial fibrillation. \par 2014. The patient completed the CardioNet about a week or 2 ago. She had absolutely no ventricular tachycardia or even any significant number of VPCs. Her atrial fibrillation rate on the average was between 80 and 90 which is acceptable. They were times when she went well over 100. She is feeling fairly well and only notices a limitation in terms of shortness of breath when it is very cold outside. In the house and even going up stairs she has no symptoms. There has been no syncope or near-syncope and no nauseousness. Her panic attacks have been better and she is seeing her psychiatrist tomorrow. She is still struggling a lot with trying to stop smoking. We elected to give her 2 months more time and then repeat the echo and see if the LV function has improved with heart rate control. If not, we would then consider retrying an ACE or adding an ARB and setting her up for a defibrillator.\par 2014. The patient returns for followup and echocardiogram. She has occasional loose bowel movements and he did take Imodium on a twice a week. She denies exertional chest pain or shortness of breath or palpitations. She has been extremely anxious the last month about this appointment in terms of whether or not she needs a defibrillator. She would like to hold off unless it is actually necessary. In addition to a mixup with the schedule she had a very long wait in the office this morning and then on top of her usual anxiety in doctor's offices has caused her heart rate to be rather fast again. Her echo seemed even more suboptimal this time and had at least moderate systolic dysfunction. I elected to put her on Diovan 80 mg and have her come back in 2 months. Next is evaluation of LV function should be MUGA or MRI. The patient was urged to stop smoking.\par May 19, 2014. The patient is here for followup. Her breathing is worse with she and her son are attributing to the carvedilol and would like her switch to a cardioselective beta blocker. Not clear if she has any wheezing. In addition she has runny nose and dry mouth but she says that predated the Diovan. She is still anxious and has had panic attacks. She says she is trying to cut down on the smoking but hasn't had much success yet. There is no PND orthopnea or pedal edema.  She was convinced that her Coreg was causing shortness of breath because of her lung disease and so we switched her to bisoprolol. She felt "100 times better" however her labs showed abnormal LFTs which will be followed up next visit.\par 2014. The patient is here for followup. She initially remains tachycardic with atrial fibrillation. She still feels wonderful and all her family think she is doing wonderfully. She was convinced that she is back to normal and that her heart rate and blood pressure only elevated because she is coming to the doctor. Her LFTs were much better. In July she had gross hematuria which was caused by a UTI. Her Xarelto was held for one day and Bactrim started and asked her UTI resolved, the hematuria resolved and she went back on Xarelto.\par 2014. The patient is here for followup.  Once again she is tachycardic and hypertensive. Again she claims she has been feeling perfectly fine when she is not the office. Unfortunately she is still smoking. She has not gained weight no developed any pedal edema. She has not had any change in her exercise capacity although she admits she has become a couch potato. The labs were acceptable other than a nonfasting glucose of 133 and her SGPT came down to 73 with the SGOT normalized. Her  said he would buy an inexpensive stethoscope and follow her heart rate on his own.\par November 3, 2014. The patient called that for the last 2 weeks her heartbeat ranged from 74-98 with a mean of 84 and a median of 85. Based on that we will not change her medications. She is thinking of stopping the Lexapro as it was only given to help her stop smoking and has not helped. She said she will work on the smoking.\par 2015. Patient is here for followup. She is still in atrial fibrillation with the initial EKG having a heart rate greater than 150. After her blood work was done in the medical assistant repeated her vital signs her heart rate was down to 91 she is always nervous when she comes here that at some point we will call the ambulance and sent her to the hospital. Just recently she accompanied her sister to the hospital with possibly an abnormal heart rhythm and now the sister is scheduled for valve replacement. Patient is still smoking otherwise she feels well. We discussed her chronic anxiety disorder. She's not sure if she was doing better on Lexapro or not. She is taking Xanax 0.25 mg nightly. No complaints of chest pain shortness of breath syncope or near syncope. No complaints of palpitations. She has also developed a mild form of Raynaud's disease. In the cold her fingers get somewhat numb and turn a little white but they are not painful. We discussed the possibility of this being from her beta blocker but so far it is mild and tolerable. On further bursting she admits she gets more fatigue and maybe more short of breath than usual with exertion which he now blames on the couch potato orthopedic continued smoking.\par 2016. The patient is here for followup along with echocardiogram. She still has a pretty rapid heartbeat and atrial fibrillation and the echo is suboptimal but still shows significant LV dysfunction. Symptomatically she is fine. She felt that her Lexapro was increasing her heart rate so she stopped it and would like to try Wellbutrin. She is still smoking. She has been under a lot of stress as they sold their house and are moving to [place], but she would still like to come here to 3 times a year for followup. She had some issues with an ear infection that she got from her grandchildren and has no pain now but feels as if it is clogged on the left.  Again we had a long discussion about true heart rate controlled, true LVEF, and whether she is a candidate for defibrillator. We agreed to try increasing her bisoprolol to 15 mg and watch for both bronchospasm and Raynaud's, as well as at some point getting a nuclear MUGA scan to get a more accurate ejection fraction and then determine if she needs an AICD.\par 2015. The patient is here for followup. In the interim she had mild breakdown when she was unable to sell her house and move and is now on Celexa. Otherwise she has felt fine. She does get fatigued very easily. Denies shortness of breath. She had a MUGA scan at the [facility] and it showed ejection fraction 48% with global hypokinesis. He had initial EKG shows a heart rate of 103 which for her is pretty low. She thinks it's because I told her already she did not need a defibrillator. She also had a colonoscopy in the interim and some polyps were found.\par 2016. Patient is here for followup. On the whole doing well although she is nervous about her upcoming vacation ([place], [place]) because she is agoraphobic. She does feel some dyspnea on exertion and limited tolerance but no different than the past year or 2. No other complaints, no change in medications, no interval medical issues hospitalizations et cetera. After exam, I increased the valsartan 80 mg to 160 mg.\par 2016. Patient is here in followup. She is in good spirits, feels wonderful with no complaints. She is continuing to babysit her grandchild during the week. She had no problems with increasing the valsartan, including no worsening of her baseline allergic rhinorrhea. She thinks it might be from Celexa, and she cut back to 10 mg, but no change so far.\par 2016. Patient is seen in followup. Good spirits and more relaxed. Does admit she is more tired than usual, which he thinks is just because she is retired and not doing anything. She does admit to more shortness of breath going upstairs. She is still smoking. No chest pain. No change in medication or other medical interval issues.\par February 10, 2017. The patient is here in followup. Around  the patient was hospitalized with influenza and rapid atrial fibrillation and then transferred to rehabilitation at [facility]. She was discharged one week ago and is now on Cardizem  mg, metoprolol 50 t.i.d., and digoxin 0.125. She is off bisoprolol and valsartan. She is still on Xarelto 20. She has a wrist blood pressure monitor and her blood pressures at home have been /53-65 and all asymptomatic and her heart rates have been between 60 and 80 but it is just measuring her peripheral pulse by her wrist. She was instructed not to take metoprolol if her blood pressure was below 110 so on the average she is only taking 50 mg once a day. She is here today and her monitor reads a heart rate of 86, while her EKG and exam showed a heart rate between 120 and 130. The blood pressure was relatively accurate although I got a little bit higher blood pressure when I checked, in the 150 range. She has had only one episode of lightheadedness and that is getting up and walking after a long car ride. Unfortunately, she did not take out her blood pressure monitor and check it then. The rest of her exam was unremarkable and the EKG just has some mild ST depressions as in the past. After long discussion I had her take her metoprolol 50 mg q.12 h., no change in the other medications, not to check her blood pressure, pulse, unless she has another dizzy episode and she will keep her upcoming appointment in one month\par 3/21/2017. Patient returns in followup. She is now on metoprolol 50 b.i.d. She is still not smoking. She has been using Advair and thinks she has been feeling better, but she has not seen pulmonary on the outside. She had seen Dr. Beverly many years ago, and she liked him but the office was very hectic. She has not been checking her blood pressure on her own. Here in the office her blood pressure was excellent and her heart rate was in the 80s.\par 2017. Patient has 3 hours away, but wants to keep coming here for followup. Has not smoked in 6 months and 14 days. Not using any inhalers because she thought they were affecting her vision and she seems to be doing fine although cannot walk very far without getting short of breath. Heart rate control seems much better with digoxin. No interval hospitalizations or medical issues.\par 2017. Patient is here in followup. She is unhappy with her move as they got a dog that she can't stand but her  likes and she is a little overwhelmed by her grandchildren being just 3 minutes away. The family is concerned as the patient stayed inside for 8 consecutive days and did not want to go out. She does have a psychiatrist that both her  and she think is crazy. She is on Lexapro 20 mg. In addition, she has some orthostatic symptoms. She looks a little pale, but denies any melena or change in bowel habits or appetite. She does not seem volume depleted. She is on Cardizem  mg daily, digoxin, 0.125, and metoprolol 50 every 8 hours, but only takes it twice a day. Labs were sent, and I am changing her to metoprolol , which she will take at night while she leaves the Cardizem CD for the morning.  Labs came back the next day with a hemoglobin of 6.4. Discussed with patient the and urged that she go to the hospital or at least to a gastroenterologist.\par 2017. Had a GI workup. No active bleeding. One lesion in the rectum that seemed to be the culprit was cauterized and a polyp removed. She was admitted to the hospital initially and transfused 2 units. She was told now to come back for colonoscopy in 5 years.\par 2018. Patient here in followup. As far as she can tell, no recurrence of GI bleeding. She is on iron twice a day, so her stool is still black. She is doing well overall although was not happy with her psychiatrist and on her own cut back the Lexapro from 20 mg to 10 mg daily; and no difference so far. Heart rate seems controlled on her current regimen, which does include digoxin. She remains anticoagulated on Xarelto\par 2018. Patient here in followup. No further GI bleeding. Off her iron. No complaints. Reviewed all her medications. Stopped her inhalers as she did not think they were helping.\par 2018. Patient here in followup.Remains in atrial fibrillation with a ventricular rate of 83, and ST changes, consistent with digoxin. Blood pressure and heart rate excellent despite being here in the office and despite the long drive with traffic from [place]. Not using any inhalers and also not seeing pulmonary. Does have chronic shortness of breath. O2 sat today 97%\par 2019. Patient here in followup. In February had a UTI with gross hematuria that seemed to respond to Bactrim DS. Here she remains in atrial fibrillation with a ventricular rate of 80 and nonspecific ST changes on digoxin. No complaints except stuck in traffic on the way here. Weight is down 9 pounds, but she has cut back on munching on snacks such as potato chips pretzels etc.\par 2019. Patient here in followup. With the humidity she notices a little more trouble breathing, but still does not want to see a pulmonologist or take chronic medications unless she is convinced they will alter the natural history of her disease. I explained I cannot answer that, she really needs to speak with her pulmonologist. No cardiac complaints and no change in any of her medications.\par 2020.  Patient here in follow-up.  No complaints.  No interval medical issues.  No change in medication.  No smoking.  EKG with A. fib with ventricular rate of 90 and otherwise within normal limits other than usual minimal ST-depressions..  Worried in case she were to come down with the flu because she was very sick and almost  a few years ago from it and they take care of 3 kids who have a lot of children absent at times in their class because of the flu.  Discussed the pros and cons of going to urgent care for a swab etc.

## 2020-02-18 LAB
ALBUMIN SERPL ELPH-MCNC: 4.5 G/DL
ALP BLD-CCNC: 111 U/L
ALT SERPL-CCNC: 11 U/L
ANION GAP SERPL CALC-SCNC: 13 MMOL/L
AST SERPL-CCNC: 16 U/L
BASOPHILS # BLD AUTO: 0.04 K/UL
BASOPHILS NFR BLD AUTO: 0.6 %
BILIRUB SERPL-MCNC: 0.4 MG/DL
BUN SERPL-MCNC: 17 MG/DL
CALCIUM SERPL-MCNC: 9.9 MG/DL
CHLORIDE SERPL-SCNC: 99 MMOL/L
CHOLEST SERPL-MCNC: 186 MG/DL
CHOLEST/HDLC SERPL: 4.1 RATIO
CO2 SERPL-SCNC: 28 MMOL/L
CREAT SERPL-MCNC: 0.78 MG/DL
DIGOXIN SERPL-MCNC: 1.6 NG/ML
EOSINOPHIL # BLD AUTO: 0.04 K/UL
EOSINOPHIL NFR BLD AUTO: 0.6 %
ESTIMATED AVERAGE GLUCOSE: 117 MG/DL
GLUCOSE SERPL-MCNC: 94 MG/DL
HBA1C MFR BLD HPLC: 5.7 %
HCT VFR BLD CALC: 52.6 %
HDLC SERPL-MCNC: 45 MG/DL
HGB BLD-MCNC: 16.6 G/DL
IMM GRANULOCYTES NFR BLD AUTO: 0.3 %
LDLC SERPL CALC-MCNC: 111 MG/DL
LYMPHOCYTES # BLD AUTO: 1.95 K/UL
LYMPHOCYTES NFR BLD AUTO: 27 %
MAN DIFF?: NORMAL
MCHC RBC-ENTMCNC: 31 PG
MCHC RBC-ENTMCNC: 31.6 GM/DL
MCV RBC AUTO: 98.1 FL
MONOCYTES # BLD AUTO: 0.59 K/UL
MONOCYTES NFR BLD AUTO: 8.2 %
NEUTROPHILS # BLD AUTO: 4.57 K/UL
NEUTROPHILS NFR BLD AUTO: 63.3 %
NT-PROBNP SERPL-MCNC: 531 PG/ML
PLATELET # BLD AUTO: 232 K/UL
POTASSIUM SERPL-SCNC: 5.1 MMOL/L
PROT SERPL-MCNC: 7.2 G/DL
RBC # BLD: 5.36 M/UL
RBC # FLD: 13 %
SODIUM SERPL-SCNC: 140 MMOL/L
TRIGL SERPL-MCNC: 153 MG/DL
TSH SERPL-ACNC: 0.96 UIU/ML
WBC # FLD AUTO: 7.21 K/UL

## 2020-02-24 ENCOUNTER — RX RENEWAL (OUTPATIENT)
Age: 75
End: 2020-02-24

## 2020-03-05 ENCOUNTER — RX RENEWAL (OUTPATIENT)
Age: 75
End: 2020-03-05

## 2020-04-18 ENCOUNTER — RX RENEWAL (OUTPATIENT)
Age: 75
End: 2020-04-18

## 2020-05-19 ENCOUNTER — RX RENEWAL (OUTPATIENT)
Age: 75
End: 2020-05-19

## 2020-07-03 ENCOUNTER — RX RENEWAL (OUTPATIENT)
Age: 75
End: 2020-07-03

## 2020-08-16 ENCOUNTER — RX RENEWAL (OUTPATIENT)
Age: 75
End: 2020-08-16

## 2020-08-17 ENCOUNTER — RX RENEWAL (OUTPATIENT)
Age: 75
End: 2020-08-17

## 2020-08-20 ENCOUNTER — APPOINTMENT (OUTPATIENT)
Dept: CARDIOLOGY | Facility: CLINIC | Age: 75
End: 2020-08-20

## 2020-09-04 ENCOUNTER — RX RENEWAL (OUTPATIENT)
Age: 75
End: 2020-09-04

## 2020-11-10 ENCOUNTER — RX RENEWAL (OUTPATIENT)
Age: 75
End: 2020-11-10

## 2020-12-21 PROBLEM — Z87.440 HISTORY OF URINARY TRACT INFECTION: Status: RESOLVED | Noted: 2019-02-13 | Resolved: 2020-12-21

## 2021-02-08 ENCOUNTER — RX RENEWAL (OUTPATIENT)
Age: 76
End: 2021-02-08

## 2021-02-12 ENCOUNTER — RX RENEWAL (OUTPATIENT)
Age: 76
End: 2021-02-12

## 2021-04-02 ENCOUNTER — NON-APPOINTMENT (OUTPATIENT)
Age: 76
End: 2021-04-02

## 2021-04-02 ENCOUNTER — APPOINTMENT (OUTPATIENT)
Dept: CARDIOLOGY | Facility: CLINIC | Age: 76
End: 2021-04-02
Payer: MEDICARE

## 2021-04-02 VITALS
WEIGHT: 177 LBS | TEMPERATURE: 97.3 F | BODY MASS INDEX: 28.57 KG/M2 | SYSTOLIC BLOOD PRESSURE: 184 MMHG | DIASTOLIC BLOOD PRESSURE: 109 MMHG | HEART RATE: 79 BPM | OXYGEN SATURATION: 92 %

## 2021-04-02 VITALS — HEART RATE: 70 BPM | SYSTOLIC BLOOD PRESSURE: 130 MMHG | DIASTOLIC BLOOD PRESSURE: 64 MMHG

## 2021-04-02 DIAGNOSIS — D75.1 SECONDARY POLYCYTHEMIA: ICD-10-CM

## 2021-04-02 PROCEDURE — 36415 COLL VENOUS BLD VENIPUNCTURE: CPT

## 2021-04-02 PROCEDURE — 99215 OFFICE O/P EST HI 40 MIN: CPT

## 2021-04-02 PROCEDURE — 93000 ELECTROCARDIOGRAM COMPLETE: CPT

## 2021-04-02 NOTE — HISTORY OF PRESENT ILLNESS
[FreeTextEntry1] :  The patient had no previous cardiac history. She was seen 4 years prior at [facility] as preop for leg surgery after a fracture. She did tell the cardiologist about on-and-off palpitations. He found absolutely nothing and the workup was negative although she is not sure if she had a stress test or an echo at that time. She doesn't see doctors very often other than a pulmonologist for emphysema and a gastroenterologist for irritable bowel syndrome. She denied a history of hypertension, diabetes, hyperlipidemia and there was no family history of coronary artery disease. However she is smoking a pack a day. Complicating the issue is also a long history of panic attacks and anxiety that has kept her from doctor's visits and hospitalizations. She also been under a tremendous amount of stress recently due to a situation involving her daughter who lives [facility] and her expectation that her parents become full-time babysitters for them and move up there after an unexpected pregnancy.\par On 2013 she was having an endoscopy and she was found to be in rapid atrial fibrillation and sent to the emergency room. I saw her there and she achieved rate control with beta blockade, ruled out for myocardial infarction. An echocardiogram done the next morning was somewhat suboptimal despite IV echo contrast but overall LV function was mildly globally diminished with an ejection fraction by fractional shortening of 50% and a severely dilated left atrium. Obviously it is unclear how long she has been in the atrial fibrillation although she had seen an internist, Dr. Candido Barragan and has seen Dr. Beverly of pulmonary and Dr. Holt of GI and nobody had noted a rapid or irregular pulse. Due to her anxiety she was discharged the following morning on bisoprolol 10 mg for rate control,  Xarelto 20 mg for anticoagulation, her baseline Lexapro along with Xanax for anxiety. \par 2013. Here for outpatient followup. She has been feeling fine and denies chest pain or shortness of breath but still feels a lot of distress and is still in atrial fibrillation. Her heart rate was still somewhat rapid although she was asymptomatic. She was still smoking as well. I increased her beta blocker and discussed stopping smoking. She was told to come back in 2 weeks and then at that point we could start to think about cardioversion medically or electrically although she would need coronary artery disease ruled out first.\par 2013. She is here for followup on the higher dose of beta blocker. She feels perfectly fine and has no symptoms whatsoever. She is active. She has gained 1 pound. She was doing well and was scheduled for a stress echocardiogram. This was done on  and in fact she was found to have a significantly dilated cardiomyopathy with moderate global dysfunction. There was no evidence of ischemia although there was poor visualization of the endocardium. The heart rate response was excessive still. Based on this I changed her to carvedilol 25 mg b.i.d. and added ramipril 5 q.d.\par 2013. The patient is here for followup. Her main issue is that after she takes her morning medicines she gets severely nauseous. The only medicine that fits the bill and is not in the afternoon is the Altace. Otherwise she has been doing fairly well with maybe some mild orthostasis. She had one episode on a  where for about 45 seconds she felt flushed, lightheaded et cetera. This has not recurred. She knows when she gets her panic attacks her heart rate does go up but otherwise she thinks her heart rate has been better. I elected to stop her Altace and see if her nauseousness went away. I also decided to set her up for 3 weeks of CardioNet monitoring to rule out ventricular arrhythmias and to better assess her heart rate control in atrial fibrillation. \par 2014. The patient completed the CardioNet about a week or 2 ago. She had absolutely no ventricular tachycardia or even any significant number of VPCs. Her atrial fibrillation rate on the average was between 80 and 90 which is acceptable. They were times when she went well over 100. She is feeling fairly well and only notices a limitation in terms of shortness of breath when it is very cold outside. In the house and even going up stairs she has no symptoms. There has been no syncope or near-syncope and no nauseousness. Her panic attacks have been better and she is seeing her psychiatrist tomorrow. She is still struggling a lot with trying to stop smoking. We elected to give her 2 months more time and then repeat the echo and see if the LV function has improved with heart rate control. If not, we would then consider retrying an ACE or adding an ARB and setting her up for a defibrillator.\par 2014. The patient returns for followup and echocardiogram. She has occasional loose bowel movements and he did take Imodium on a twice a week. She denies exertional chest pain or shortness of breath or palpitations. She has been extremely anxious the last month about this appointment in terms of whether or not she needs a defibrillator. She would like to hold off unless it is actually necessary. In addition to a mixup with the schedule she had a very long wait in the office this morning and then on top of her usual anxiety in doctor's offices has caused her heart rate to be rather fast again. Her echo seemed even more suboptimal this time and had at least moderate systolic dysfunction. I elected to put her on Diovan 80 mg and have her come back in 2 months. Next is evaluation of LV function should be MUGA or MRI. The patient was urged to stop smoking.\par May 19, 2014. The patient is here for followup. Her breathing is worse with she and her son are attributing to the carvedilol and would like her switch to a cardioselective beta blocker. Not clear if she has any wheezing. In addition she has runny nose and dry mouth but she says that predated the Diovan. She is still anxious and has had panic attacks. She says she is trying to cut down on the smoking but hasn't had much success yet. There is no PND orthopnea or pedal edema.  She was convinced that her Coreg was causing shortness of breath because of her lung disease and so we switched her to bisoprolol. She felt "100 times better" however her labs showed abnormal LFTs which will be followed up next visit.\par 2014. The patient is here for followup. She initially remains tachycardic with atrial fibrillation. She still feels wonderful and all her family think she is doing wonderfully. She was convinced that she is back to normal and that her heart rate and blood pressure only elevated because she is coming to the doctor. Her LFTs were much better. In July she had gross hematuria which was caused by a UTI. Her Xarelto was held for one day and Bactrim started and asked her UTI resolved, the hematuria resolved and she went back on Xarelto.\par 2014. The patient is here for followup.  Once again she is tachycardic and hypertensive. Again she claims she has been feeling perfectly fine when she is not the office. Unfortunately she is still smoking. She has not gained weight no developed any pedal edema. She has not had any change in her exercise capacity although she admits she has become a couch potato. The labs were acceptable other than a nonfasting glucose of 133 and her SGPT came down to 73 with the SGOT normalized. Her  said he would buy an inexpensive stethoscope and follow her heart rate on his own.\par November 3, 2014. The patient called that for the last 2 weeks her heartbeat ranged from 74-98 with a mean of 84 and a median of 85. Based on that we will not change her medications. She is thinking of stopping the Lexapro as it was only given to help her stop smoking and has not helped. She said she will work on the smoking.\par 2015. Patient is here for followup. She is still in atrial fibrillation with the initial EKG having a heart rate greater than 150. After her blood work was done in the medical assistant repeated her vital signs her heart rate was down to 91 she is always nervous when she comes here that at some point we will call the ambulance and sent her to the hospital. Just recently she accompanied her sister to the hospital with possibly an abnormal heart rhythm and now the sister is scheduled for valve replacement. Patient is still smoking otherwise she feels well. We discussed her chronic anxiety disorder. She's not sure if she was doing better on Lexapro or not. She is taking Xanax 0.25 mg nightly. No complaints of chest pain shortness of breath syncope or near syncope. No complaints of palpitations. She has also developed a mild form of Raynaud's disease. In the cold her fingers get somewhat numb and turn a little white but they are not painful. We discussed the possibility of this being from her beta blocker but so far it is mild and tolerable. On further bursting she admits she gets more fatigue and maybe more short of breath than usual with exertion which he now blames on the couch potato orthopedic continued smoking.\par 2016. The patient is here for followup along with echocardiogram. She still has a pretty rapid heartbeat and atrial fibrillation and the echo is suboptimal but still shows significant LV dysfunction. Symptomatically she is fine. She felt that her Lexapro was increasing her heart rate so she stopped it and would like to try Wellbutrin. She is still smoking. She has been under a lot of stress as they sold their house and are moving to [place], but she would still like to come here to 3 times a year for followup. She had some issues with an ear infection that she got from her grandchildren and has no pain now but feels as if it is clogged on the left.  Again we had a long discussion about true heart rate controlled, true LVEF, and whether she is a candidate for defibrillator. We agreed to try increasing her bisoprolol to 15 mg and watch for both bronchospasm and Raynaud's, as well as at some point getting a nuclear MUGA scan to get a more accurate ejection fraction and then determine if she needs an AICD.\par 2015. The patient is here for followup. In the interim she had mild breakdown when she was unable to sell her house and move and is now on Celexa. Otherwise she has felt fine. She does get fatigued very easily. Denies shortness of breath. She had a MUGA scan at the [facility] and it showed ejection fraction 48% with global hypokinesis. He had initial EKG shows a heart rate of 103 which for her is pretty low. She thinks it's because I told her already she did not need a defibrillator. She also had a colonoscopy in the interim and some polyps were found.\par 2016. Patient is here for followup. On the whole doing well although she is nervous about her upcoming vacation ([place], [place]) because she is agoraphobic. She does feel some dyspnea on exertion and limited tolerance but no different than the past year or 2. No other complaints, no change in medications, no interval medical issues hospitalizations et cetera. After exam, I increased the valsartan 80 mg to 160 mg.\par 2016. Patient is here in followup. She is in good spirits, feels wonderful with no complaints. She is continuing to babysit her grandchild during the week. She had no problems with increasing the valsartan, including no worsening of her baseline allergic rhinorrhea. She thinks it might be from Celexa, and she cut back to 10 mg, but no change so far.\par 2016. Patient is seen in followup. Good spirits and more relaxed. Does admit she is more tired than usual, which he thinks is just because she is retired and not doing anything. She does admit to more shortness of breath going upstairs. She is still smoking. No chest pain. No change in medication or other medical interval issues.\par February 10, 2017. The patient is here in followup. Around  the patient was hospitalized with influenza and rapid atrial fibrillation and then transferred to rehabilitation at [facility]. She was discharged one week ago and is now on Cardizem  mg, metoprolol 50 t.i.d., and digoxin 0.125. She is off bisoprolol and valsartan. She is still on Xarelto 20. She has a wrist blood pressure monitor and her blood pressures at home have been /53-65 and all asymptomatic and her heart rates have been between 60 and 80 but it is just measuring her peripheral pulse by her wrist. She was instructed not to take metoprolol if her blood pressure was below 110 so on the average she is only taking 50 mg once a day. She is here today and her monitor reads a heart rate of 86, while her EKG and exam showed a heart rate between 120 and 130. The blood pressure was relatively accurate although I got a little bit higher blood pressure when I checked, in the 150 range. She has had only one episode of lightheadedness and that is getting up and walking after a long car ride. Unfortunately, she did not take out her blood pressure monitor and check it then. The rest of her exam was unremarkable and the EKG just has some mild ST depressions as in the past. After long discussion I had her take her metoprolol 50 mg q.12 h., no change in the other medications, not to check her blood pressure, pulse, unless she has another dizzy episode and she will keep her upcoming appointment in one month\par 3/21/2017. Patient returns in followup. She is now on metoprolol 50 b.i.d. She is still not smoking. She has been using Advair and thinks she has been feeling better, but she has not seen pulmonary on the outside. She had seen Dr. Beverly many years ago, and she liked him but the office was very hectic. She has not been checking her blood pressure on her own. Here in the office her blood pressure was excellent and her heart rate was in the 80s.\par 2017. Patient has 3 hours away, but wants to keep coming here for followup. Has not smoked in 6 months and 14 days. Not using any inhalers because she thought they were affecting her vision and she seems to be doing fine although cannot walk very far without getting short of breath. Heart rate control seems much better with digoxin. No interval hospitalizations or medical issues.\par 2017. Patient is here in followup. She is unhappy with her move as they got a dog that she can't stand but her  likes and she is a little overwhelmed by her grandchildren being just 3 minutes away. The family is concerned as the patient stayed inside for 8 consecutive days and did not want to go out. She does have a psychiatrist that both her  and she think is crazy. She is on Lexapro 20 mg. In addition, she has some orthostatic symptoms. She looks a little pale, but denies any melena or change in bowel habits or appetite. She does not seem volume depleted. She is on Cardizem  mg daily, digoxin, 0.125, and metoprolol 50 every 8 hours, but only takes it twice a day. Labs were sent, and I am changing her to metoprolol , which she will take at night while she leaves the Cardizem CD for the morning.  Labs came back the next day with a hemoglobin of 6.4. Discussed with patient the and urged that she go to the hospital or at least to a gastroenterologist.\par 2017. Had a GI workup. No active bleeding. One lesion in the rectum that seemed to be the culprit was cauterized and a polyp removed. She was admitted to the hospital initially and transfused 2 units. She was told now to come back for colonoscopy in 5 years.\par 2018. Patient here in followup. As far as she can tell, no recurrence of GI bleeding. She is on iron twice a day, so her stool is still black. She is doing well overall although was not happy with her psychiatrist and on her own cut back the Lexapro from 20 mg to 10 mg daily; and no difference so far. Heart rate seems controlled on her current regimen, which does include digoxin. She remains anticoagulated on Xarelto\par 2018. Patient here in followup. No further GI bleeding. Off her iron. No complaints. Reviewed all her medications. Stopped her inhalers as she did not think they were helping.\par 2018. Patient here in followup.Remains in atrial fibrillation with a ventricular rate of 83, and ST changes, consistent with digoxin. Blood pressure and heart rate excellent despite being here in the office and despite the long drive with traffic from [place]. Not using any inhalers and also not seeing pulmonary. Does have chronic shortness of breath. O2 sat today 97%\par 2019. Patient here in followup. In February had a UTI with gross hematuria that seemed to respond to Bactrim DS. Here she remains in atrial fibrillation with a ventricular rate of 80 and nonspecific ST changes on digoxin. No complaints except stuck in traffic on the way here. Weight is down 9 pounds, but she has cut back on munching on snacks such as potato chips pretzels etc.\par 2019. Patient here in followup. With the humidity she notices a little more trouble breathing, but still does not want to see a pulmonologist or take chronic medications unless she is convinced they will alter the natural history of her disease. I explained I cannot answer that, she really needs to speak with her pulmonologist. No cardiac complaints and no change in any of her medications.\par 2020.  Patient here in follow-up.  No complaints.  No interval medical issues.  No change in medication.  No smoking.  EKG with A. fib with ventricular rate of 90 and otherwise within normal limits other than usual minimal ST-depressions..  Worried in case she were to come down with the flu because she was very sick and almost  a few years ago from it and they take care of 3 kids who have a lot of children absent at times in their class because of the flu.  Discussed the pros and cons of going to urgent care for a swab etc.\par 2021.  First visit in over a year. ("I am the first doctor she called after she got vaccinated for COVID-19."))  After her visit last year she had an elevated hemoglobin and hematocrit and I recommended she see a pulmonologist but then Covid hit and she has been pretty much isolated the entire year, very careful until she is finally vaccinated.  No new complaints or symptoms.  Not smoking.  No change in medication.  Blood pressure initially very high but came down nicely.  EKG with A. fib with good rate control and with nonspecific ST changes probably from digoxin.  No bright red blood per rectum or dark black stool etc.

## 2021-04-02 NOTE — REASON FOR VISIT
[FreeTextEntry1] : Office visit for this 75-year-old woman with atrial fibrillation with difficult rate control and cardiomyopathy now having moved three hours away, found to have iron deficiency anemia, and then GI workup

## 2021-04-05 LAB
ALBUMIN SERPL ELPH-MCNC: 4.5 G/DL
ALP BLD-CCNC: 99 U/L
ALT SERPL-CCNC: 14 U/L
ANION GAP SERPL CALC-SCNC: 13 MMOL/L
AST SERPL-CCNC: 16 U/L
BASOPHILS # BLD AUTO: 0.03 K/UL
BASOPHILS NFR BLD AUTO: 0.4 %
BILIRUB SERPL-MCNC: 0.4 MG/DL
BUN SERPL-MCNC: 16 MG/DL
CALCIUM SERPL-MCNC: 9.6 MG/DL
CHLORIDE SERPL-SCNC: 99 MMOL/L
CHOLEST SERPL-MCNC: 188 MG/DL
CO2 SERPL-SCNC: 27 MMOL/L
CREAT SERPL-MCNC: 0.77 MG/DL
DIGOXIN SERPL-MCNC: 0.8 NG/ML
EOSINOPHIL # BLD AUTO: 0.02 K/UL
EOSINOPHIL NFR BLD AUTO: 0.3 %
ESTIMATED AVERAGE GLUCOSE: 120 MG/DL
FERRITIN SERPL-MCNC: 129 NG/ML
GLUCOSE SERPL-MCNC: 106 MG/DL
HBA1C MFR BLD HPLC: 5.8 %
HCT VFR BLD CALC: 53.8 %
HDLC SERPL-MCNC: 48 MG/DL
HGB BLD-MCNC: 16.6 G/DL
IMM GRANULOCYTES NFR BLD AUTO: 0.4 %
IRON SATN MFR SERPL: 30 %
IRON SERPL-MCNC: 125 UG/DL
LDLC SERPL CALC-MCNC: 110 MG/DL
LYMPHOCYTES # BLD AUTO: 1.92 K/UL
LYMPHOCYTES NFR BLD AUTO: 24.1 %
MAN DIFF?: NORMAL
MCHC RBC-ENTMCNC: 30.8 PG
MCHC RBC-ENTMCNC: 30.9 GM/DL
MCV RBC AUTO: 99.8 FL
MONOCYTES # BLD AUTO: 0.6 K/UL
MONOCYTES NFR BLD AUTO: 7.5 %
NEUTROPHILS # BLD AUTO: 5.36 K/UL
NEUTROPHILS NFR BLD AUTO: 67.3 %
NONHDLC SERPL-MCNC: 140 MG/DL
NT-PROBNP SERPL-MCNC: 893 PG/ML
PLATELET # BLD AUTO: 193 K/UL
POTASSIUM SERPL-SCNC: 4.6 MMOL/L
PROT SERPL-MCNC: 7.2 G/DL
RBC # BLD: 5.39 M/UL
RBC # FLD: 13.2 %
SODIUM SERPL-SCNC: 138 MMOL/L
TIBC SERPL-MCNC: 412 UG/DL
TRIGL SERPL-MCNC: 152 MG/DL
TSH SERPL-ACNC: 0.86 UIU/ML
UIBC SERPL-MCNC: 287 UG/DL
WBC # FLD AUTO: 7.96 K/UL

## 2021-04-06 LAB — EPO SERPL-MCNC: 10.4 MIU/ML

## 2021-05-05 ENCOUNTER — RX RENEWAL (OUTPATIENT)
Age: 76
End: 2021-05-05

## 2021-05-07 ENCOUNTER — RX RENEWAL (OUTPATIENT)
Age: 76
End: 2021-05-07

## 2021-05-11 ENCOUNTER — RX RENEWAL (OUTPATIENT)
Age: 76
End: 2021-05-11

## 2021-06-20 ENCOUNTER — RX RENEWAL (OUTPATIENT)
Age: 76
End: 2021-06-20

## 2021-08-06 ENCOUNTER — RX RENEWAL (OUTPATIENT)
Age: 76
End: 2021-08-06

## 2021-08-10 ENCOUNTER — RX RENEWAL (OUTPATIENT)
Age: 76
End: 2021-08-10

## 2021-08-25 NOTE — DISCHARGE NOTE ADULT - NS AS ACTIVITY OBS
What Type Of Note Output Would You Prefer (Optional)?: Standard Output Is The Patient Presenting As Previously Scheduled?: Yes How Severe Is Your Rash?: moderate Is This A New Presentation, Or A Follow-Up?: Rash Stairs allowed No Heavy lifting/straining/Walking-Indoors allowed/Bathing allowed/Stairs allowed

## 2021-10-07 ENCOUNTER — APPOINTMENT (OUTPATIENT)
Dept: CARDIOLOGY | Facility: CLINIC | Age: 76
End: 2021-10-07

## 2021-10-11 ENCOUNTER — NON-APPOINTMENT (OUTPATIENT)
Age: 76
End: 2021-10-11

## 2021-11-02 ENCOUNTER — RX RENEWAL (OUTPATIENT)
Age: 76
End: 2021-11-02

## 2021-11-04 ENCOUNTER — RX RENEWAL (OUTPATIENT)
Age: 76
End: 2021-11-04

## 2021-11-08 ENCOUNTER — RX RENEWAL (OUTPATIENT)
Age: 76
End: 2021-11-08

## 2021-12-06 ENCOUNTER — APPOINTMENT (OUTPATIENT)
Dept: CARDIOLOGY | Facility: CLINIC | Age: 76
End: 2021-12-06
Payer: MEDICARE

## 2021-12-06 ENCOUNTER — NON-APPOINTMENT (OUTPATIENT)
Age: 76
End: 2021-12-06

## 2021-12-06 VITALS
HEART RATE: 80 BPM | SYSTOLIC BLOOD PRESSURE: 178 MMHG | HEIGHT: 66 IN | DIASTOLIC BLOOD PRESSURE: 92 MMHG | OXYGEN SATURATION: 97 %

## 2021-12-06 VITALS — SYSTOLIC BLOOD PRESSURE: 160 MMHG | DIASTOLIC BLOOD PRESSURE: 85 MMHG | HEART RATE: 74 BPM

## 2021-12-06 PROCEDURE — 93000 ELECTROCARDIOGRAM COMPLETE: CPT

## 2021-12-06 PROCEDURE — 93306 TTE W/DOPPLER COMPLETE: CPT

## 2021-12-06 PROCEDURE — 36415 COLL VENOUS BLD VENIPUNCTURE: CPT

## 2021-12-06 PROCEDURE — 99214 OFFICE O/P EST MOD 30 MIN: CPT | Mod: 25

## 2021-12-06 RX ORDER — ALBUTEROL SULFATE 90 UG/1
108 (90 BASE) INHALANT RESPIRATORY (INHALATION)
Qty: 8 | Refills: 0 | Status: ACTIVE | COMMUNITY
Start: 2021-10-12

## 2021-12-06 RX ORDER — UMECLIDINIUM BROMIDE AND VILANTEROL TRIFENATATE 62.5; 25 UG/1; UG/1
62.5-25 POWDER RESPIRATORY (INHALATION)
Qty: 180 | Refills: 0 | Status: ACTIVE | COMMUNITY
Start: 2021-10-12

## 2021-12-06 NOTE — HISTORY OF PRESENT ILLNESS
[FreeTextEntry1] :  The patient had no previous cardiac history. She was seen 4 years prior at [facility] as preop for leg surgery after a fracture. She did tell the cardiologist about on-and-off palpitations. He found absolutely nothing and the workup was negative although she is not sure if she had a stress test or an echo at that time. She doesn't see doctors very often other than a pulmonologist for emphysema and a gastroenterologist for irritable bowel syndrome. She denied a history of hypertension, diabetes, hyperlipidemia and there was no family history of coronary artery disease. However she is smoking a pack a day. Complicating the issue is also a long history of panic attacks and anxiety that has kept her from doctor's visits and hospitalizations. She also been under a tremendous amount of stress recently due to a situation involving her daughter who lives [facility] and her expectation that her parents become full-time babysitters for them and move up there after an unexpected pregnancy.\par On 2013 she was having an endoscopy and she was found to be in rapid atrial fibrillation and sent to the emergency room. I saw her there and she achieved rate control with beta blockade, ruled out for myocardial infarction. An echocardiogram done the next morning was somewhat suboptimal despite IV echo contrast but overall LV function was mildly globally diminished with an ejection fraction by fractional shortening of 50% and a severely dilated left atrium. Obviously it is unclear how long she has been in the atrial fibrillation although she had seen an internist, Dr. Candido Barragan and has seen Dr. Beverly of pulmonary and Dr. Holt of GI and nobody had noted a rapid or irregular pulse. Due to her anxiety she was discharged the following morning on bisoprolol 10 mg for rate control,  Xarelto 20 mg for anticoagulation, her baseline Lexapro along with Xanax for anxiety. \par 2013. Here for outpatient followup. She has been feeling fine and denies chest pain or shortness of breath but still feels a lot of distress and is still in atrial fibrillation. Her heart rate was still somewhat rapid although she was asymptomatic. She was still smoking as well. I increased her beta blocker and discussed stopping smoking. She was told to come back in 2 weeks and then at that point we could start to think about cardioversion medically or electrically although she would need coronary artery disease ruled out first.\par 2013. She is here for followup on the higher dose of beta blocker. She feels perfectly fine and has no symptoms whatsoever. She is active. She has gained 1 pound. She was doing well and was scheduled for a stress echocardiogram. This was done on  and in fact she was found to have a significantly dilated cardiomyopathy with moderate global dysfunction. There was no evidence of ischemia although there was poor visualization of the endocardium. The heart rate response was excessive still. Based on this I changed her to carvedilol 25 mg b.i.d. and added ramipril 5 q.d.\par 2013. The patient is here for followup. Her main issue is that after she takes her morning medicines she gets severely nauseous. The only medicine that fits the bill and is not in the afternoon is the Altace. Otherwise she has been doing fairly well with maybe some mild orthostasis. She had one episode on a  where for about 45 seconds she felt flushed, lightheaded et cetera. This has not recurred. She knows when she gets her panic attacks her heart rate does go up but otherwise she thinks her heart rate has been better. I elected to stop her Altace and see if her nauseousness went away. I also decided to set her up for 3 weeks of CardioNet monitoring to rule out ventricular arrhythmias and to better assess her heart rate control in atrial fibrillation. \par 2014. The patient completed the CardioNet about a week or 2 ago. She had absolutely no ventricular tachycardia or even any significant number of VPCs. Her atrial fibrillation rate on the average was between 80 and 90 which is acceptable. They were times when she went well over 100. She is feeling fairly well and only notices a limitation in terms of shortness of breath when it is very cold outside. In the house and even going up stairs she has no symptoms. There has been no syncope or near-syncope and no nauseousness. Her panic attacks have been better and she is seeing her psychiatrist tomorrow. She is still struggling a lot with trying to stop smoking. We elected to give her 2 months more time and then repeat the echo and see if the LV function has improved with heart rate control. If not, we would then consider retrying an ACE or adding an ARB and setting her up for a defibrillator.\par 2014. The patient returns for followup and echocardiogram. She has occasional loose bowel movements and he did take Imodium on a twice a week. She denies exertional chest pain or shortness of breath or palpitations. She has been extremely anxious the last month about this appointment in terms of whether or not she needs a defibrillator. She would like to hold off unless it is actually necessary. In addition to a mixup with the schedule she had a very long wait in the office this morning and then on top of her usual anxiety in doctor's offices has caused her heart rate to be rather fast again. Her echo seemed even more suboptimal this time and had at least moderate systolic dysfunction. I elected to put her on Diovan 80 mg and have her come back in 2 months. Next is evaluation of LV function should be MUGA or MRI. The patient was urged to stop smoking.\par May 19, 2014. The patient is here for followup. Her breathing is worse with she and her son are attributing to the carvedilol and would like her switch to a cardioselective beta blocker. Not clear if she has any wheezing. In addition she has runny nose and dry mouth but she says that predated the Diovan. She is still anxious and has had panic attacks. She says she is trying to cut down on the smoking but hasn't had much success yet. There is no PND orthopnea or pedal edema.  She was convinced that her Coreg was causing shortness of breath because of her lung disease and so we switched her to bisoprolol. She felt "100 times better" however her labs showed abnormal LFTs which will be followed up next visit.\par 2014. The patient is here for followup. She initially remains tachycardic with atrial fibrillation. She still feels wonderful and all her family think she is doing wonderfully. She was convinced that she is back to normal and that her heart rate and blood pressure only elevated because she is coming to the doctor. Her LFTs were much better. In July she had gross hematuria which was caused by a UTI. Her Xarelto was held for one day and Bactrim started and asked her UTI resolved, the hematuria resolved and she went back on Xarelto.\par 2014. The patient is here for followup.  Once again she is tachycardic and hypertensive. Again she claims she has been feeling perfectly fine when she is not the office. Unfortunately she is still smoking. She has not gained weight no developed any pedal edema. She has not had any change in her exercise capacity although she admits she has become a couch potato. The labs were acceptable other than a nonfasting glucose of 133 and her SGPT came down to 73 with the SGOT normalized. Her  said he would buy an inexpensive stethoscope and follow her heart rate on his own.\par November 3, 2014. The patient called that for the last 2 weeks her heartbeat ranged from 74-98 with a mean of 84 and a median of 85. Based on that we will not change her medications. She is thinking of stopping the Lexapro as it was only given to help her stop smoking and has not helped. She said she will work on the smoking.\par 2015. Patient is here for followup. She is still in atrial fibrillation with the initial EKG having a heart rate greater than 150. After her blood work was done in the medical assistant repeated her vital signs her heart rate was down to 91 she is always nervous when she comes here that at some point we will call the ambulance and sent her to the hospital. Just recently she accompanied her sister to the hospital with possibly an abnormal heart rhythm and now the sister is scheduled for valve replacement. Patient is still smoking otherwise she feels well. We discussed her chronic anxiety disorder. She's not sure if she was doing better on Lexapro or not. She is taking Xanax 0.25 mg nightly. No complaints of chest pain shortness of breath syncope or near syncope. No complaints of palpitations. She has also developed a mild form of Raynaud's disease. In the cold her fingers get somewhat numb and turn a little white but they are not painful. We discussed the possibility of this being from her beta blocker but so far it is mild and tolerable. On further bursting she admits she gets more fatigue and maybe more short of breath than usual with exertion which he now blames on the couch potato orthopedic continued smoking.\par 2016. The patient is here for followup along with echocardiogram. She still has a pretty rapid heartbeat and atrial fibrillation and the echo is suboptimal but still shows significant LV dysfunction. Symptomatically she is fine. She felt that her Lexapro was increasing her heart rate so she stopped it and would like to try Wellbutrin. She is still smoking. She has been under a lot of stress as they sold their house and are moving to [place], but she would still like to come here to 3 times a year for followup. She had some issues with an ear infection that she got from her grandchildren and has no pain now but feels as if it is clogged on the left.  Again we had a long discussion about true heart rate controlled, true LVEF, and whether she is a candidate for defibrillator. We agreed to try increasing her bisoprolol to 15 mg and watch for both bronchospasm and Raynaud's, as well as at some point getting a nuclear MUGA scan to get a more accurate ejection fraction and then determine if she needs an AICD.\par 2015. The patient is here for followup. In the interim she had mild breakdown when she was unable to sell her house and move and is now on Celexa. Otherwise she has felt fine. She does get fatigued very easily. Denies shortness of breath. She had a MUGA scan at the [facility] and it showed ejection fraction 48% with global hypokinesis. He had initial EKG shows a heart rate of 103 which for her is pretty low. She thinks it's because I told her already she did not need a defibrillator. She also had a colonoscopy in the interim and some polyps were found.\par 2016. Patient is here for followup. On the whole doing well although she is nervous about her upcoming vacation ([place], [place]) because she is agoraphobic. She does feel some dyspnea on exertion and limited tolerance but no different than the past year or 2. No other complaints, no change in medications, no interval medical issues hospitalizations et cetera. After exam, I increased the valsartan 80 mg to 160 mg.\par 2016. Patient is here in followup. She is in good spirits, feels wonderful with no complaints. She is continuing to babysit her grandchild during the week. She had no problems with increasing the valsartan, including no worsening of her baseline allergic rhinorrhea. She thinks it might be from Celexa, and she cut back to 10 mg, but no change so far.\par 2016. Patient is seen in followup. Good spirits and more relaxed. Does admit she is more tired than usual, which he thinks is just because she is retired and not doing anything. She does admit to more shortness of breath going upstairs. She is still smoking. No chest pain. No change in medication or other medical interval issues.\par February 10, 2017. The patient is here in followup. Around  the patient was hospitalized with influenza and rapid atrial fibrillation and then transferred to rehabilitation at [facility]. She was discharged one week ago and is now on Cardizem  mg, metoprolol 50 t.i.d., and digoxin 0.125. She is off bisoprolol and valsartan. She is still on Xarelto 20. She has a wrist blood pressure monitor and her blood pressures at home have been /53-65 and all asymptomatic and her heart rates have been between 60 and 80 but it is just measuring her peripheral pulse by her wrist. She was instructed not to take metoprolol if her blood pressure was below 110 so on the average she is only taking 50 mg once a day. She is here today and her monitor reads a heart rate of 86, while her EKG and exam showed a heart rate between 120 and 130. The blood pressure was relatively accurate although I got a little bit higher blood pressure when I checked, in the 150 range. She has had only one episode of lightheadedness and that is getting up and walking after a long car ride. Unfortunately, she did not take out her blood pressure monitor and check it then. The rest of her exam was unremarkable and the EKG just has some mild ST depressions as in the past. After long discussion I had her take her metoprolol 50 mg q.12 h., no change in the other medications, not to check her blood pressure, pulse, unless she has another dizzy episode and she will keep her upcoming appointment in one month\par 3/21/2017. Patient returns in followup. She is now on metoprolol 50 b.i.d. She is still not smoking. She has been using Advair and thinks she has been feeling better, but she has not seen pulmonary on the outside. She had seen Dr. Beverly many years ago, and she liked him but the office was very hectic. She has not been checking her blood pressure on her own. Here in the office her blood pressure was excellent and her heart rate was in the 80s.\par 2017. Patient has 3 hours away, but wants to keep coming here for followup. Has not smoked in 6 months and 14 days. Not using any inhalers because she thought they were affecting her vision and she seems to be doing fine although cannot walk very far without getting short of breath. Heart rate control seems much better with digoxin. No interval hospitalizations or medical issues.\par 2017. Patient is here in followup. She is unhappy with her move as they got a dog that she can't stand but her  likes and she is a little overwhelmed by her grandchildren being just 3 minutes away. The family is concerned as the patient stayed inside for 8 consecutive days and did not want to go out. She does have a psychiatrist that both her  and she think is crazy. She is on Lexapro 20 mg. In addition, she has some orthostatic symptoms. She looks a little pale, but denies any melena or change in bowel habits or appetite. She does not seem volume depleted. She is on Cardizem  mg daily, digoxin, 0.125, and metoprolol 50 every 8 hours, but only takes it twice a day. Labs were sent, and I am changing her to metoprolol , which she will take at night while she leaves the Cardizem CD for the morning.  Labs came back the next day with a hemoglobin of 6.4. Discussed with patient the and urged that she go to the hospital or at least to a gastroenterologist.\par 2017. Had a GI workup. No active bleeding. One lesion in the rectum that seemed to be the culprit was cauterized and a polyp removed. She was admitted to the hospital initially and transfused 2 units. She was told now to come back for colonoscopy in 5 years.\par 2018. Patient here in followup. As far as she can tell, no recurrence of GI bleeding. She is on iron twice a day, so her stool is still black. She is doing well overall although was not happy with her psychiatrist and on her own cut back the Lexapro from 20 mg to 10 mg daily; and no difference so far. Heart rate seems controlled on her current regimen, which does include digoxin. She remains anticoagulated on Xarelto\par 2018. Patient here in followup. No further GI bleeding. Off her iron. No complaints. Reviewed all her medications. Stopped her inhalers as she did not think they were helping.\par 2018. Patient here in followup.Remains in atrial fibrillation with a ventricular rate of 83, and ST changes, consistent with digoxin. Blood pressure and heart rate excellent despite being here in the office and despite the long drive with traffic from [place]. Not using any inhalers and also not seeing pulmonary. Does have chronic shortness of breath. O2 sat today 97%\par 2019. Patient here in followup. In February had a UTI with gross hematuria that seemed to respond to Bactrim DS. Here she remains in atrial fibrillation with a ventricular rate of 80 and nonspecific ST changes on digoxin. No complaints except stuck in traffic on the way here. Weight is down 9 pounds, but she has cut back on munching on snacks such as potato chips pretzels etc.\par 2019. Patient here in followup. With the humidity she notices a little more trouble breathing, but still does not want to see a pulmonologist or take chronic medications unless she is convinced they will alter the natural history of her disease. I explained I cannot answer that, she really needs to speak with her pulmonologist. No cardiac complaints and no change in any of her medications.\par 2020.  Patient here in follow-up.  No complaints.  No interval medical issues.  No change in medication.  No smoking.  EKG with A. fib with ventricular rate of 90 and otherwise within normal limits other than usual minimal ST-depressions..  Worried in case she were to come down with the flu because she was very sick and almost  a few years ago from it and they take care of 3 kids who have a lot of children absent at times in their class because of the flu.  Discussed the pros and cons of going to urgent care for a swab etc.\par 2021.  First visit in over a year. ("I am the first doctor she called after she got vaccinated for COVID-19."))  After her visit last year she had an elevated hemoglobin and hematocrit and I recommended she see a pulmonologist but then Covid hit and she has been pretty much isolated the entire year, very careful until she is finally vaccinated.  No new complaints or symptoms.  Not smoking.  No change in medication.  Blood pressure initially very high but came down nicely.  EKG with A. fib with good rate control and with nonspecific ST changes probably from digoxin.  No bright red blood per rectum or dark black stool etc.\par 2021.  Patient returns for follow-up and echocardiogram.  Her echo while suboptimal as usual actually seems to have pretty much normal LV systolic function and just mild MR.  It does not seem to be different from an echo in 2017 done at the hospital.  Clinically she has no complaints of palpitations or shortness of breath although she did see a pulmonologist finally and is on an inhaler and doing a little better.  He is setting her up for a sleep study as well.  Her skin lesions which she calls sebaceous cysts, have increased especially 1 by the left side of her neck but she has not gone to see anybody about that.  Fully vaccinated and a booster for Covid.  (Her daughters boyfriend and his entire family came down with 12 cases of Covid right after Thanksgiving but thankfully all were vaccinated and none had to be hospitalized.)

## 2021-12-06 NOTE — REASON FOR VISIT
[FreeTextEntry1] : Office visit for this 76-year-old woman with atrial fibrillation with difficult rate control and cardiomyopathy now having moved three hours away, found to have iron deficiency anemia, and then GI workup

## 2021-12-06 NOTE — HISTORY OF PRESENT ILLNESS
[FreeTextEntry1] :  The patient had no previous cardiac history. She was seen 4 years prior at [facility] as preop for leg surgery after a fracture. She did tell the cardiologist about on-and-off palpitations. He found absolutely nothing and the workup was negative although she is not sure if she had a stress test or an echo at that time. She doesn't see doctors very often other than a pulmonologist for emphysema and a gastroenterologist for irritable bowel syndrome. She denied a history of hypertension, diabetes, hyperlipidemia and there was no family history of coronary artery disease. However she is smoking a pack a day. Complicating the issue is also a long history of panic attacks and anxiety that has kept her from doctor's visits and hospitalizations. She also been under a tremendous amount of stress recently due to a situation involving her daughter who lives [facility] and her expectation that her parents become full-time babysitters for them and move up there after an unexpected pregnancy.\par On 2013 she was having an endoscopy and she was found to be in rapid atrial fibrillation and sent to the emergency room. I saw her there and she achieved rate control with beta blockade, ruled out for myocardial infarction. An echocardiogram done the next morning was somewhat suboptimal despite IV echo contrast but overall LV function was mildly globally diminished with an ejection fraction by fractional shortening of 50% and a severely dilated left atrium. Obviously it is unclear how long she has been in the atrial fibrillation although she had seen an internist, Dr. Candido Barragan and has seen Dr. Beverly of pulmonary and Dr. Holt of GI and nobody had noted a rapid or irregular pulse. Due to her anxiety she was discharged the following morning on bisoprolol 10 mg for rate control,  Xarelto 20 mg for anticoagulation, her baseline Lexapro along with Xanax for anxiety. \par 2013. Here for outpatient followup. She has been feeling fine and denies chest pain or shortness of breath but still feels a lot of distress and is still in atrial fibrillation. Her heart rate was still somewhat rapid although she was asymptomatic. She was still smoking as well. I increased her beta blocker and discussed stopping smoking. She was told to come back in 2 weeks and then at that point we could start to think about cardioversion medically or electrically although she would need coronary artery disease ruled out first.\par 2013. She is here for followup on the higher dose of beta blocker. She feels perfectly fine and has no symptoms whatsoever. She is active. She has gained 1 pound. She was doing well and was scheduled for a stress echocardiogram. This was done on  and in fact she was found to have a significantly dilated cardiomyopathy with moderate global dysfunction. There was no evidence of ischemia although there was poor visualization of the endocardium. The heart rate response was excessive still. Based on this I changed her to carvedilol 25 mg b.i.d. and added ramipril 5 q.d.\par 2013. The patient is here for followup. Her main issue is that after she takes her morning medicines she gets severely nauseous. The only medicine that fits the bill and is not in the afternoon is the Altace. Otherwise she has been doing fairly well with maybe some mild orthostasis. She had one episode on a  where for about 45 seconds she felt flushed, lightheaded et cetera. This has not recurred. She knows when she gets her panic attacks her heart rate does go up but otherwise she thinks her heart rate has been better. I elected to stop her Altace and see if her nauseousness went away. I also decided to set her up for 3 weeks of CardioNet monitoring to rule out ventricular arrhythmias and to better assess her heart rate control in atrial fibrillation. \par 2014. The patient completed the CardioNet about a week or 2 ago. She had absolutely no ventricular tachycardia or even any significant number of VPCs. Her atrial fibrillation rate on the average was between 80 and 90 which is acceptable. They were times when she went well over 100. She is feeling fairly well and only notices a limitation in terms of shortness of breath when it is very cold outside. In the house and even going up stairs she has no symptoms. There has been no syncope or near-syncope and no nauseousness. Her panic attacks have been better and she is seeing her psychiatrist tomorrow. She is still struggling a lot with trying to stop smoking. We elected to give her 2 months more time and then repeat the echo and see if the LV function has improved with heart rate control. If not, we would then consider retrying an ACE or adding an ARB and setting her up for a defibrillator.\par 2014. The patient returns for followup and echocardiogram. She has occasional loose bowel movements and he did take Imodium on a twice a week. She denies exertional chest pain or shortness of breath or palpitations. She has been extremely anxious the last month about this appointment in terms of whether or not she needs a defibrillator. She would like to hold off unless it is actually necessary. In addition to a mixup with the schedule she had a very long wait in the office this morning and then on top of her usual anxiety in doctor's offices has caused her heart rate to be rather fast again. Her echo seemed even more suboptimal this time and had at least moderate systolic dysfunction. I elected to put her on Diovan 80 mg and have her come back in 2 months. Next is evaluation of LV function should be MUGA or MRI. The patient was urged to stop smoking.\par May 19, 2014. The patient is here for followup. Her breathing is worse with she and her son are attributing to the carvedilol and would like her switch to a cardioselective beta blocker. Not clear if she has any wheezing. In addition she has runny nose and dry mouth but she says that predated the Diovan. She is still anxious and has had panic attacks. She says she is trying to cut down on the smoking but hasn't had much success yet. There is no PND orthopnea or pedal edema.  She was convinced that her Coreg was causing shortness of breath because of her lung disease and so we switched her to bisoprolol. She felt "100 times better" however her labs showed abnormal LFTs which will be followed up next visit.\par 2014. The patient is here for followup. She initially remains tachycardic with atrial fibrillation. She still feels wonderful and all her family think she is doing wonderfully. She was convinced that she is back to normal and that her heart rate and blood pressure only elevated because she is coming to the doctor. Her LFTs were much better. In July she had gross hematuria which was caused by a UTI. Her Xarelto was held for one day and Bactrim started and asked her UTI resolved, the hematuria resolved and she went back on Xarelto.\par 2014. The patient is here for followup.  Once again she is tachycardic and hypertensive. Again she claims she has been feeling perfectly fine when she is not the office. Unfortunately she is still smoking. She has not gained weight no developed any pedal edema. She has not had any change in her exercise capacity although she admits she has become a couch potato. The labs were acceptable other than a nonfasting glucose of 133 and her SGPT came down to 73 with the SGOT normalized. Her  said he would buy an inexpensive stethoscope and follow her heart rate on his own.\par November 3, 2014. The patient called that for the last 2 weeks her heartbeat ranged from 74-98 with a mean of 84 and a median of 85. Based on that we will not change her medications. She is thinking of stopping the Lexapro as it was only given to help her stop smoking and has not helped. She said she will work on the smoking.\par 2015. Patient is here for followup. She is still in atrial fibrillation with the initial EKG having a heart rate greater than 150. After her blood work was done in the medical assistant repeated her vital signs her heart rate was down to 91 she is always nervous when she comes here that at some point we will call the ambulance and sent her to the hospital. Just recently she accompanied her sister to the hospital with possibly an abnormal heart rhythm and now the sister is scheduled for valve replacement. Patient is still smoking otherwise she feels well. We discussed her chronic anxiety disorder. She's not sure if she was doing better on Lexapro or not. She is taking Xanax 0.25 mg nightly. No complaints of chest pain shortness of breath syncope or near syncope. No complaints of palpitations. She has also developed a mild form of Raynaud's disease. In the cold her fingers get somewhat numb and turn a little white but they are not painful. We discussed the possibility of this being from her beta blocker but so far it is mild and tolerable. On further bursting she admits she gets more fatigue and maybe more short of breath than usual with exertion which he now blames on the couch potato orthopedic continued smoking.\par 2016. The patient is here for followup along with echocardiogram. She still has a pretty rapid heartbeat and atrial fibrillation and the echo is suboptimal but still shows significant LV dysfunction. Symptomatically she is fine. She felt that her Lexapro was increasing her heart rate so she stopped it and would like to try Wellbutrin. She is still smoking. She has been under a lot of stress as they sold their house and are moving to [place], but she would still like to come here to 3 times a year for followup. She had some issues with an ear infection that she got from her grandchildren and has no pain now but feels as if it is clogged on the left.  Again we had a long discussion about true heart rate controlled, true LVEF, and whether she is a candidate for defibrillator. We agreed to try increasing her bisoprolol to 15 mg and watch for both bronchospasm and Raynaud's, as well as at some point getting a nuclear MUGA scan to get a more accurate ejection fraction and then determine if she needs an AICD.\par 2015. The patient is here for followup. In the interim she had mild breakdown when she was unable to sell her house and move and is now on Celexa. Otherwise she has felt fine. She does get fatigued very easily. Denies shortness of breath. She had a MUGA scan at the [facility] and it showed ejection fraction 48% with global hypokinesis. He had initial EKG shows a heart rate of 103 which for her is pretty low. She thinks it's because I told her already she did not need a defibrillator. She also had a colonoscopy in the interim and some polyps were found.\par 2016. Patient is here for followup. On the whole doing well although she is nervous about her upcoming vacation ([place], [place]) because she is agoraphobic. She does feel some dyspnea on exertion and limited tolerance but no different than the past year or 2. No other complaints, no change in medications, no interval medical issues hospitalizations et cetera. After exam, I increased the valsartan 80 mg to 160 mg.\par 2016. Patient is here in followup. She is in good spirits, feels wonderful with no complaints. She is continuing to babysit her grandchild during the week. She had no problems with increasing the valsartan, including no worsening of her baseline allergic rhinorrhea. She thinks it might be from Celexa, and she cut back to 10 mg, but no change so far.\par 2016. Patient is seen in followup. Good spirits and more relaxed. Does admit she is more tired than usual, which he thinks is just because she is retired and not doing anything. She does admit to more shortness of breath going upstairs. She is still smoking. No chest pain. No change in medication or other medical interval issues.\par February 10, 2017. The patient is here in followup. Around  the patient was hospitalized with influenza and rapid atrial fibrillation and then transferred to rehabilitation at [facility]. She was discharged one week ago and is now on Cardizem  mg, metoprolol 50 t.i.d., and digoxin 0.125. She is off bisoprolol and valsartan. She is still on Xarelto 20. She has a wrist blood pressure monitor and her blood pressures at home have been /53-65 and all asymptomatic and her heart rates have been between 60 and 80 but it is just measuring her peripheral pulse by her wrist. She was instructed not to take metoprolol if her blood pressure was below 110 so on the average she is only taking 50 mg once a day. She is here today and her monitor reads a heart rate of 86, while her EKG and exam showed a heart rate between 120 and 130. The blood pressure was relatively accurate although I got a little bit higher blood pressure when I checked, in the 150 range. She has had only one episode of lightheadedness and that is getting up and walking after a long car ride. Unfortunately, she did not take out her blood pressure monitor and check it then. The rest of her exam was unremarkable and the EKG just has some mild ST depressions as in the past. After long discussion I had her take her metoprolol 50 mg q.12 h., no change in the other medications, not to check her blood pressure, pulse, unless she has another dizzy episode and she will keep her upcoming appointment in one month\par 3/21/2017. Patient returns in followup. She is now on metoprolol 50 b.i.d. She is still not smoking. She has been using Advair and thinks she has been feeling better, but she has not seen pulmonary on the outside. She had seen Dr. Beverly many years ago, and she liked him but the office was very hectic. She has not been checking her blood pressure on her own. Here in the office her blood pressure was excellent and her heart rate was in the 80s.\par 2017. Patient has 3 hours away, but wants to keep coming here for followup. Has not smoked in 6 months and 14 days. Not using any inhalers because she thought they were affecting her vision and she seems to be doing fine although cannot walk very far without getting short of breath. Heart rate control seems much better with digoxin. No interval hospitalizations or medical issues.\par 2017. Patient is here in followup. She is unhappy with her move as they got a dog that she can't stand but her  likes and she is a little overwhelmed by her grandchildren being just 3 minutes away. The family is concerned as the patient stayed inside for 8 consecutive days and did not want to go out. She does have a psychiatrist that both her  and she think is crazy. She is on Lexapro 20 mg. In addition, she has some orthostatic symptoms. She looks a little pale, but denies any melena or change in bowel habits or appetite. She does not seem volume depleted. She is on Cardizem  mg daily, digoxin, 0.125, and metoprolol 50 every 8 hours, but only takes it twice a day. Labs were sent, and I am changing her to metoprolol , which she will take at night while she leaves the Cardizem CD for the morning.  Labs came back the next day with a hemoglobin of 6.4. Discussed with patient the and urged that she go to the hospital or at least to a gastroenterologist.\par 2017. Had a GI workup. No active bleeding. One lesion in the rectum that seemed to be the culprit was cauterized and a polyp removed. She was admitted to the hospital initially and transfused 2 units. She was told now to come back for colonoscopy in 5 years.\par 2018. Patient here in followup. As far as she can tell, no recurrence of GI bleeding. She is on iron twice a day, so her stool is still black. She is doing well overall although was not happy with her psychiatrist and on her own cut back the Lexapro from 20 mg to 10 mg daily; and no difference so far. Heart rate seems controlled on her current regimen, which does include digoxin. She remains anticoagulated on Xarelto\par 2018. Patient here in followup. No further GI bleeding. Off her iron. No complaints. Reviewed all her medications. Stopped her inhalers as she did not think they were helping.\par 2018. Patient here in followup.Remains in atrial fibrillation with a ventricular rate of 83, and ST changes, consistent with digoxin. Blood pressure and heart rate excellent despite being here in the office and despite the long drive with traffic from [place]. Not using any inhalers and also not seeing pulmonary. Does have chronic shortness of breath. O2 sat today 97%\par 2019. Patient here in followup. In February had a UTI with gross hematuria that seemed to respond to Bactrim DS. Here she remains in atrial fibrillation with a ventricular rate of 80 and nonspecific ST changes on digoxin. No complaints except stuck in traffic on the way here. Weight is down 9 pounds, but she has cut back on munching on snacks such as potato chips pretzels etc.\par 2019. Patient here in followup. With the humidity she notices a little more trouble breathing, but still does not want to see a pulmonologist or take chronic medications unless she is convinced they will alter the natural history of her disease. I explained I cannot answer that, she really needs to speak with her pulmonologist. No cardiac complaints and no change in any of her medications.\par 2020.  Patient here in follow-up.  No complaints.  No interval medical issues.  No change in medication.  No smoking.  EKG with A. fib with ventricular rate of 90 and otherwise within normal limits other than usual minimal ST-depressions..  Worried in case she were to come down with the flu because she was very sick and almost  a few years ago from it and they take care of 3 kids who have a lot of children absent at times in their class because of the flu.  Discussed the pros and cons of going to urgent care for a swab etc.\par 2021.  First visit in over a year. ("I am the first doctor she called after she got vaccinated for COVID-19."))  After her visit last year she had an elevated hemoglobin and hematocrit and I recommended she see a pulmonologist but then Covid hit and she has been pretty much isolated the entire year, very careful until she is finally vaccinated.  No new complaints or symptoms.  Not smoking.  No change in medication.  Blood pressure initially very high but came down nicely.  EKG with A. fib with good rate control and with nonspecific ST changes probably from digoxin.  No bright red blood per rectum or dark black stool etc.\par 2021.  Patient returns for follow-up and echocardiogram.  Her echo while suboptimal as usual actually seems to have pretty much normal LV systolic function and just mild MR.  It does not seem to be different from an echo in 2017 done at the hospital.  Clinically she has no complaints of palpitations or shortness of breath although she did see a pulmonologist finally and is on an inhaler and doing a little better.  He is setting her up for a sleep study as well.  Her skin lesions which she calls sebaceous cysts, have increased especially 1 by the left side of her neck but she has not gone to see anybody about that.  Fully vaccinated and a booster for Covid.  (Her daughters boyfriend and his entire family came down with 12 cases of Covid right after Thanksgiving but thankfully all were vaccinated and none had to be hospitalized.)

## 2021-12-06 NOTE — REVIEW OF SYSTEMS
[Dyspnea on exertion] : dyspnea during exertion [Chest Discomfort] : no chest discomfort [Lower Ext Edema] : no extremity edema [Palpitations] : no palpitations [Syncope] : no syncope [Skin Lesions] : skin lesion(s): [Negative] : Heme/Lymph [de-identified] : see HPI [de-identified] : Always somewhat anxious but improved overall

## 2021-12-06 NOTE — REVIEW OF SYSTEMS
[Dyspnea on exertion] : dyspnea during exertion [Chest Discomfort] : no chest discomfort [Lower Ext Edema] : no extremity edema [Palpitations] : no palpitations [Syncope] : no syncope [Skin Lesions] : skin lesion(s): [Negative] : Heme/Lymph [de-identified] : see HPI [de-identified] : Always somewhat anxious but improved overall

## 2021-12-07 LAB
ALBUMIN SERPL ELPH-MCNC: 4.4 G/DL
ALP BLD-CCNC: 120 U/L
ALT SERPL-CCNC: 21 U/L
ANION GAP SERPL CALC-SCNC: 18 MMOL/L
AST SERPL-CCNC: 23 U/L
BASOPHILS # BLD AUTO: 0.03 K/UL
BASOPHILS NFR BLD AUTO: 0.4 %
BILIRUB SERPL-MCNC: 0.5 MG/DL
BUN SERPL-MCNC: 17 MG/DL
CALCIUM SERPL-MCNC: 10.1 MG/DL
CHLORIDE SERPL-SCNC: 99 MMOL/L
CHOLEST SERPL-MCNC: 227 MG/DL
CO2 SERPL-SCNC: 24 MMOL/L
CREAT SERPL-MCNC: 0.84 MG/DL
DIGOXIN SERPL-MCNC: 1.3 NG/ML
EOSINOPHIL # BLD AUTO: 0.03 K/UL
EOSINOPHIL NFR BLD AUTO: 0.4 %
ESTIMATED AVERAGE GLUCOSE: 117 MG/DL
FERRITIN SERPL-MCNC: 178 NG/ML
GLUCOSE SERPL-MCNC: 103 MG/DL
HBA1C MFR BLD HPLC: 5.7 %
HCT VFR BLD CALC: 54.7 %
HDLC SERPL-MCNC: 51 MG/DL
HGB BLD-MCNC: 17.7 G/DL
IMM GRANULOCYTES NFR BLD AUTO: 0.7 %
IRON SATN MFR SERPL: 26 %
IRON SERPL-MCNC: 108 UG/DL
LDLC SERPL CALC-MCNC: 144 MG/DL
LYMPHOCYTES # BLD AUTO: 1.98 K/UL
LYMPHOCYTES NFR BLD AUTO: 27.7 %
MAN DIFF?: NORMAL
MCHC RBC-ENTMCNC: 31.6 PG
MCHC RBC-ENTMCNC: 32.4 GM/DL
MCV RBC AUTO: 97.7 FL
MONOCYTES # BLD AUTO: 0.49 K/UL
MONOCYTES NFR BLD AUTO: 6.9 %
NEUTROPHILS # BLD AUTO: 4.56 K/UL
NEUTROPHILS NFR BLD AUTO: 63.9 %
NONHDLC SERPL-MCNC: 176 MG/DL
NT-PROBNP SERPL-MCNC: 914 PG/ML
PLATELET # BLD AUTO: 231 K/UL
POTASSIUM SERPL-SCNC: 5.3 MMOL/L
PROT SERPL-MCNC: 7.5 G/DL
RBC # BLD: 5.6 M/UL
RBC # FLD: 13.1 %
SODIUM SERPL-SCNC: 140 MMOL/L
TIBC SERPL-MCNC: 419 UG/DL
TRIGL SERPL-MCNC: 156 MG/DL
TSH SERPL-ACNC: 0.84 UIU/ML
TSH SERPL-ACNC: 0.87 UIU/ML
UIBC SERPL-MCNC: 311 UG/DL
WBC # FLD AUTO: 7.14 K/UL

## 2022-01-04 ENCOUNTER — RX RENEWAL (OUTPATIENT)
Age: 77
End: 2022-01-04

## 2022-02-03 ENCOUNTER — RX RENEWAL (OUTPATIENT)
Age: 77
End: 2022-02-03

## 2022-04-06 ENCOUNTER — RX RENEWAL (OUTPATIENT)
Age: 77
End: 2022-04-06

## 2022-05-02 ENCOUNTER — RX RENEWAL (OUTPATIENT)
Age: 77
End: 2022-05-02

## 2022-05-03 ENCOUNTER — RX RENEWAL (OUTPATIENT)
Age: 77
End: 2022-05-03

## 2022-08-04 ENCOUNTER — RX RENEWAL (OUTPATIENT)
Age: 77
End: 2022-08-04

## 2022-08-17 ENCOUNTER — APPOINTMENT (OUTPATIENT)
Dept: CARDIOLOGY | Facility: CLINIC | Age: 77
End: 2022-08-17

## 2022-08-17 ENCOUNTER — NON-APPOINTMENT (OUTPATIENT)
Age: 77
End: 2022-08-17

## 2022-08-17 VITALS
HEIGHT: 66 IN | SYSTOLIC BLOOD PRESSURE: 200 MMHG | BODY MASS INDEX: 28.77 KG/M2 | WEIGHT: 179 LBS | HEART RATE: 80 BPM | DIASTOLIC BLOOD PRESSURE: 79 MMHG | OXYGEN SATURATION: 95 %

## 2022-08-17 VITALS — HEART RATE: 76 BPM | SYSTOLIC BLOOD PRESSURE: 170 MMHG | DIASTOLIC BLOOD PRESSURE: 80 MMHG

## 2022-08-17 PROCEDURE — 36415 COLL VENOUS BLD VENIPUNCTURE: CPT

## 2022-08-17 PROCEDURE — 99215 OFFICE O/P EST HI 40 MIN: CPT

## 2022-08-17 PROCEDURE — 93000 ELECTROCARDIOGRAM COMPLETE: CPT

## 2022-08-17 NOTE — HISTORY OF PRESENT ILLNESS
[FreeTextEntry1] :  The patient had no previous cardiac history. She was seen 4 years prior at [facility] as preop for leg surgery after a fracture. She did tell the cardiologist about on-and-off palpitations. He found absolutely nothing and the workup was negative although she is not sure if she had a stress test or an echo at that time. She doesn't see doctors very often other than a pulmonologist for emphysema and a gastroenterologist for irritable bowel syndrome. She denied a history of hypertension, diabetes, hyperlipidemia and there was no family history of coronary artery disease. However she is smoking a pack a day. Complicating the issue is also a long history of panic attacks and anxiety that has kept her from doctor's visits and hospitalizations. She also been under a tremendous amount of stress recently due to a situation involving her daughter who lives [facility] and her expectation that her parents become full-time babysitters for them and move up there after an unexpected pregnancy.\par On 2013 she was having an endoscopy and she was found to be in rapid atrial fibrillation and sent to the emergency room. I saw her there and she achieved rate control with beta blockade, ruled out for myocardial infarction. An echocardiogram done the next morning was somewhat suboptimal despite IV echo contrast but overall LV function was mildly globally diminished with an ejection fraction by fractional shortening of 50% and a severely dilated left atrium. Obviously it is unclear how long she has been in the atrial fibrillation although she had seen an internist, Dr. Candido Barragan and has seen Dr. Beverly of pulmonary and Dr. Holt of GI and nobody had noted a rapid or irregular pulse. Due to her anxiety she was discharged the following morning on bisoprolol 10 mg for rate control,  Xarelto 20 mg for anticoagulation, her baseline Lexapro along with Xanax for anxiety. \par 2013. Here for outpatient followup. She has been feeling fine and denies chest pain or shortness of breath but still feels a lot of distress and is still in atrial fibrillation. Her heart rate was still somewhat rapid although she was asymptomatic. She was still smoking as well. I increased her beta blocker and discussed stopping smoking. She was told to come back in 2 weeks and then at that point we could start to think about cardioversion medically or electrically although she would need coronary artery disease ruled out first.\par 2013. She is here for followup on the higher dose of beta blocker. She feels perfectly fine and has no symptoms whatsoever. She is active. She has gained 1 pound. She was doing well and was scheduled for a stress echocardiogram. This was done on  and in fact she was found to have a significantly dilated cardiomyopathy with moderate global dysfunction. There was no evidence of ischemia although there was poor visualization of the endocardium. The heart rate response was excessive still. Based on this I changed her to carvedilol 25 mg b.i.d. and added ramipril 5 q.d.\par 2013. The patient is here for followup. Her main issue is that after she takes her morning medicines she gets severely nauseous. The only medicine that fits the bill and is not in the afternoon is the Altace. Otherwise she has been doing fairly well with maybe some mild orthostasis. She had one episode on a  where for about 45 seconds she felt flushed, lightheaded et cetera. This has not recurred. She knows when she gets her panic attacks her heart rate does go up but otherwise she thinks her heart rate has been better. I elected to stop her Altace and see if her nauseousness went away. I also decided to set her up for 3 weeks of CardioNet monitoring to rule out ventricular arrhythmias and to better assess her heart rate control in atrial fibrillation. \par 2014. The patient completed the CardioNet about a week or 2 ago. She had absolutely no ventricular tachycardia or even any significant number of VPCs. Her atrial fibrillation rate on the average was between 80 and 90 which is acceptable. They were times when she went well over 100. She is feeling fairly well and only notices a limitation in terms of shortness of breath when it is very cold outside. In the house and even going up stairs she has no symptoms. There has been no syncope or near-syncope and no nauseousness. Her panic attacks have been better and she is seeing her psychiatrist tomorrow. She is still struggling a lot with trying to stop smoking. We elected to give her 2 months more time and then repeat the echo and see if the LV function has improved with heart rate control. If not, we would then consider retrying an ACE or adding an ARB and setting her up for a defibrillator.\par 2014. The patient returns for followup and echocardiogram. She has occasional loose bowel movements and he did take Imodium on a twice a week. She denies exertional chest pain or shortness of breath or palpitations. She has been extremely anxious the last month about this appointment in terms of whether or not she needs a defibrillator. She would like to hold off unless it is actually necessary. In addition to a mixup with the schedule she had a very long wait in the office this morning and then on top of her usual anxiety in doctor's offices has caused her heart rate to be rather fast again. Her echo seemed even more suboptimal this time and had at least moderate systolic dysfunction. I elected to put her on Diovan 80 mg and have her come back in 2 months. Next is evaluation of LV function should be MUGA or MRI. The patient was urged to stop smoking.\par May 19, 2014. The patient is here for followup. Her breathing is worse with she and her son are attributing to the carvedilol and would like her switch to a cardioselective beta blocker. Not clear if she has any wheezing. In addition she has runny nose and dry mouth but she says that predated the Diovan. She is still anxious and has had panic attacks. She says she is trying to cut down on the smoking but hasn't had much success yet. There is no PND orthopnea or pedal edema.  She was convinced that her Coreg was causing shortness of breath because of her lung disease and so we switched her to bisoprolol. She felt "100 times better" however her labs showed abnormal LFTs which will be followed up next visit.\par 2014. The patient is here for followup. She initially remains tachycardic with atrial fibrillation. She still feels wonderful and all her family think she is doing wonderfully. She was convinced that she is back to normal and that her heart rate and blood pressure only elevated because she is coming to the doctor. Her LFTs were much better. In July she had gross hematuria which was caused by a UTI. Her Xarelto was held for one day and Bactrim started and asked her UTI resolved, the hematuria resolved and she went back on Xarelto.\par 2014. The patient is here for followup.  Once again she is tachycardic and hypertensive. Again she claims she has been feeling perfectly fine when she is not the office. Unfortunately she is still smoking. She has not gained weight no developed any pedal edema. She has not had any change in her exercise capacity although she admits she has become a couch potato. The labs were acceptable other than a nonfasting glucose of 133 and her SGPT came down to 73 with the SGOT normalized. Her  said he would buy an inexpensive stethoscope and follow her heart rate on his own.\par November 3, 2014. The patient called that for the last 2 weeks her heartbeat ranged from 74-98 with a mean of 84 and a median of 85. Based on that we will not change her medications. She is thinking of stopping the Lexapro as it was only given to help her stop smoking and has not helped. She said she will work on the smoking.\par 2015. Patient is here for followup. She is still in atrial fibrillation with the initial EKG having a heart rate greater than 150. After her blood work was done in the medical assistant repeated her vital signs her heart rate was down to 91 she is always nervous when she comes here that at some point we will call the ambulance and sent her to the hospital. Just recently she accompanied her sister to the hospital with possibly an abnormal heart rhythm and now the sister is scheduled for valve replacement. Patient is still smoking otherwise she feels well. We discussed her chronic anxiety disorder. She's not sure if she was doing better on Lexapro or not. She is taking Xanax 0.25 mg nightly. No complaints of chest pain shortness of breath syncope or near syncope. No complaints of palpitations. She has also developed a mild form of Raynaud's disease. In the cold her fingers get somewhat numb and turn a little white but they are not painful. We discussed the possibility of this being from her beta blocker but so far it is mild and tolerable. On further bursting she admits she gets more fatigue and maybe more short of breath than usual with exertion which he now blames on the couch potato orthopedic continued smoking.\par 2016. The patient is here for followup along with echocardiogram. She still has a pretty rapid heartbeat and atrial fibrillation and the echo is suboptimal but still shows significant LV dysfunction. Symptomatically she is fine. She felt that her Lexapro was increasing her heart rate so she stopped it and would like to try Wellbutrin. She is still smoking. She has been under a lot of stress as they sold their house and are moving to [place], but she would still like to come here to 3 times a year for followup. She had some issues with an ear infection that she got from her grandchildren and has no pain now but feels as if it is clogged on the left.  Again we had a long discussion about true heart rate controlled, true LVEF, and whether she is a candidate for defibrillator. We agreed to try increasing her bisoprolol to 15 mg and watch for both bronchospasm and Raynaud's, as well as at some point getting a nuclear MUGA scan to get a more accurate ejection fraction and then determine if she needs an AICD.\par 2015. The patient is here for followup. In the interim she had mild breakdown when she was unable to sell her house and move and is now on Celexa. Otherwise she has felt fine. She does get fatigued very easily. Denies shortness of breath. She had a MUGA scan at the [facility] and it showed ejection fraction 48% with global hypokinesis. He had initial EKG shows a heart rate of 103 which for her is pretty low. She thinks it's because I told her already she did not need a defibrillator. She also had a colonoscopy in the interim and some polyps were found.\par 2016. Patient is here for followup. On the whole doing well although she is nervous about her upcoming vacation ([place], [place]) because she is agoraphobic. She does feel some dyspnea on exertion and limited tolerance but no different than the past year or 2. No other complaints, no change in medications, no interval medical issues hospitalizations et cetera. After exam, I increased the valsartan 80 mg to 160 mg.\par 2016. Patient is here in followup. She is in good spirits, feels wonderful with no complaints. She is continuing to babysit her grandchild during the week. She had no problems with increasing the valsartan, including no worsening of her baseline allergic rhinorrhea. She thinks it might be from Celexa, and she cut back to 10 mg, but no change so far.\par 2016. Patient is seen in followup. Good spirits and more relaxed. Does admit she is more tired than usual, which he thinks is just because she is retired and not doing anything. She does admit to more shortness of breath going upstairs. She is still smoking. No chest pain. No change in medication or other medical interval issues.\par February 10, 2017. The patient is here in followup. Around  the patient was hospitalized with influenza and rapid atrial fibrillation and then transferred to rehabilitation at [facility]. She was discharged one week ago and is now on Cardizem  mg, metoprolol 50 t.i.d., and digoxin 0.125. She is off bisoprolol and valsartan. She is still on Xarelto 20. She has a wrist blood pressure monitor and her blood pressures at home have been /53-65 and all asymptomatic and her heart rates have been between 60 and 80 but it is just measuring her peripheral pulse by her wrist. She was instructed not to take metoprolol if her blood pressure was below 110 so on the average she is only taking 50 mg once a day. She is here today and her monitor reads a heart rate of 86, while her EKG and exam showed a heart rate between 120 and 130. The blood pressure was relatively accurate although I got a little bit higher blood pressure when I checked, in the 150 range. She has had only one episode of lightheadedness and that is getting up and walking after a long car ride. Unfortunately, she did not take out her blood pressure monitor and check it then. The rest of her exam was unremarkable and the EKG just has some mild ST depressions as in the past. After long discussion I had her take her metoprolol 50 mg q.12 h., no change in the other medications, not to check her blood pressure, pulse, unless she has another dizzy episode and she will keep her upcoming appointment in one month\par 3/21/2017. Patient returns in followup. She is now on metoprolol 50 b.i.d. She is still not smoking. She has been using Advair and thinks she has been feeling better, but she has not seen pulmonary on the outside. She had seen Dr. Beverly many years ago, and she liked him but the office was very hectic. She has not been checking her blood pressure on her own. Here in the office her blood pressure was excellent and her heart rate was in the 80s.\par 2017. Patient has 3 hours away, but wants to keep coming here for followup. Has not smoked in 6 months and 14 days. Not using any inhalers because she thought they were affecting her vision and she seems to be doing fine although cannot walk very far without getting short of breath. Heart rate control seems much better with digoxin. No interval hospitalizations or medical issues.\par 2017. Patient is here in followup. She is unhappy with her move as they got a dog that she can't stand but her  likes and she is a little overwhelmed by her grandchildren being just 3 minutes away. The family is concerned as the patient stayed inside for 8 consecutive days and did not want to go out. She does have a psychiatrist that both her  and she think is crazy. She is on Lexapro 20 mg. In addition, she has some orthostatic symptoms. She looks a little pale, but denies any melena or change in bowel habits or appetite. She does not seem volume depleted. She is on Cardizem  mg daily, digoxin, 0.125, and metoprolol 50 every 8 hours, but only takes it twice a day. Labs were sent, and I am changing her to metoprolol , which she will take at night while she leaves the Cardizem CD for the morning.  Labs came back the next day with a hemoglobin of 6.4. Discussed with patient the and urged that she go to the hospital or at least to a gastroenterologist.\par 2017. Had a GI workup. No active bleeding. One lesion in the rectum that seemed to be the culprit was cauterized and a polyp removed. She was admitted to the hospital initially and transfused 2 units. She was told now to come back for colonoscopy in 5 years.\par 2018. Patient here in followup. As far as she can tell, no recurrence of GI bleeding. She is on iron twice a day, so her stool is still black. She is doing well overall although was not happy with her psychiatrist and on her own cut back the Lexapro from 20 mg to 10 mg daily; and no difference so far. Heart rate seems controlled on her current regimen, which does include digoxin. She remains anticoagulated on Xarelto\par 2018. Patient here in followup. No further GI bleeding. Off her iron. No complaints. Reviewed all her medications. Stopped her inhalers as she did not think they were helping.\par 2018. Patient here in followup.Remains in atrial fibrillation with a ventricular rate of 83, and ST changes, consistent with digoxin. Blood pressure and heart rate excellent despite being here in the office and despite the long drive with traffic from [place]. Not using any inhalers and also not seeing pulmonary. Does have chronic shortness of breath. O2 sat today 97%\par 2019. Patient here in followup. In February had a UTI with gross hematuria that seemed to respond to Bactrim DS. Here she remains in atrial fibrillation with a ventricular rate of 80 and nonspecific ST changes on digoxin. No complaints except stuck in traffic on the way here. Weight is down 9 pounds, but she has cut back on munching on snacks such as potato chips pretzels etc.\par 2019. Patient here in followup. With the humidity she notices a little more trouble breathing, but still does not want to see a pulmonologist or take chronic medications unless she is convinced they will alter the natural history of her disease. I explained I cannot answer that, she really needs to speak with her pulmonologist. No cardiac complaints and no change in any of her medications.\par 2020.  Patient here in follow-up.  No complaints.  No interval medical issues.  No change in medication.  No smoking.  EKG with A. fib with ventricular rate of 90 and otherwise within normal limits other than usual minimal ST-depressions..  Worried in case she were to come down with the flu because she was very sick and almost  a few years ago from it and they take care of 3 kids who have a lot of children absent at times in their class because of the flu.  Discussed the pros and cons of going to urgent care for a swab etc.\par 2021.  First visit in over a year. ("I am the first doctor she called after she got vaccinated for COVID-19."))  After her visit last year she had an elevated hemoglobin and hematocrit and I recommended she see a pulmonologist but then Covid hit and she has been pretty much isolated the entire year, very careful until she is finally vaccinated.  No new complaints or symptoms.  Not smoking.  No change in medication.  Blood pressure initially very high but came down nicely.  EKG with A. fib with good rate control and with nonspecific ST changes probably from digoxin.  No bright red blood per rectum or dark black stool etc.\par 2021.  Patient returns for follow-up and echocardiogram.  Her echo while suboptimal as usual actually seems to have pretty much normal LV systolic function and just mild MR.  It does not seem to be different from an echo in 2017 done at the hospital.  Clinically she has no complaints of palpitations or shortness of breath although she did see a pulmonologist finally and is on an inhaler and doing a little better.  He is setting her up for a sleep study as well.  Her skin lesions which she calls sebaceous cysts, have increased especially 1 by the left side of her neck but she has not gone to see anybody about that.  Fully vaccinated and a booster for Covid.  (Her daughters boyfriend and his entire family came down with 12 cases of Covid right after Thanksgiving but thankfully all were vaccinated and none had to be hospitalized.)\par 2022.  First visit since last December.  EKG atrial fibrillation with a ventricular rate of 81 and ST depressions in leads I to III aVF V2 through 6 and elevation in aVR probably all digitalis effect; chest a little more prominent than the EKG back in December.  Initial blood pressure 200/79.  But did come down to 170/80 at the end.  She was at urgent care relatively recently and it was 142/80.  She finally decided to have the large sebaceous cyst on the left side of her neck removed but the dermatologist in her area had next appointment in 1 year.  She went to a physician at urgent care that she likes and he recommended that she see a plastic surgeon about this and she will see him and get it scheduled.  Her pulmonologist would like her to have a sleep study, I believe not a full apnea study but the study to see if she desaturates at night and then will be a candidate for nocturnal oxygen.  We discussed her new inhaler that she uses and she also has a rescue inhaler but has never had to use it.  Her shortness of breath with exertion seems to be about the same.  No chest pain, no edema, no palpitations.  Mood and spirits and anxiety level much much better these days.  She also brought up the question of Eliquis versus Xarelto and is willing to switch to Eliquis 5 mg twice daily.

## 2022-08-17 NOTE — REVIEW OF SYSTEMS
[Dyspnea on exertion] : dyspnea during exertion [Skin Lesions] : skin lesion(s): [Negative] : Heme/Lymph [Weight Gain (___ Lbs)] : [unfilled] ~Ulb weight gain [Chest Discomfort] : no chest discomfort [Lower Ext Edema] : no extremity edema [Palpitations] : no palpitations [Syncope] : no syncope [de-identified] : see HPI [de-identified] : Always somewhat anxious but improved overall

## 2022-08-18 LAB
ALBUMIN SERPL ELPH-MCNC: 4.6 G/DL
ALP BLD-CCNC: 95 U/L
ALT SERPL-CCNC: 19 U/L
ANION GAP SERPL CALC-SCNC: 13 MMOL/L
AST SERPL-CCNC: 21 U/L
BASOPHILS # BLD AUTO: 0.04 K/UL
BASOPHILS NFR BLD AUTO: 0.5 %
BILIRUB SERPL-MCNC: 0.4 MG/DL
BUN SERPL-MCNC: 16 MG/DL
CALCIUM SERPL-MCNC: 10.1 MG/DL
CHLORIDE SERPL-SCNC: 104 MMOL/L
CHOLEST SERPL-MCNC: 203 MG/DL
CO2 SERPL-SCNC: 24 MMOL/L
CREAT SERPL-MCNC: 0.88 MG/DL
DIGOXIN SERPL-MCNC: 1.2 NG/ML
EGFR: 68 ML/MIN/1.73M2
EOSINOPHIL # BLD AUTO: 0.03 K/UL
EOSINOPHIL NFR BLD AUTO: 0.3 %
ESTIMATED AVERAGE GLUCOSE: 120 MG/DL
FERRITIN SERPL-MCNC: 219 NG/ML
GLUCOSE SERPL-MCNC: 105 MG/DL
HBA1C MFR BLD HPLC: 5.8 %
HCT VFR BLD CALC: 49 %
HDLC SERPL-MCNC: 46 MG/DL
HGB BLD-MCNC: 16.1 G/DL
IMM GRANULOCYTES NFR BLD AUTO: 0.3 %
IRON SATN MFR SERPL: 33 %
IRON SERPL-MCNC: 127 UG/DL
LDLC SERPL CALC-MCNC: 115 MG/DL
LYMPHOCYTES # BLD AUTO: 1.94 K/UL
LYMPHOCYTES NFR BLD AUTO: 22.5 %
MAN DIFF?: NORMAL
MCHC RBC-ENTMCNC: 31 PG
MCHC RBC-ENTMCNC: 32.9 GM/DL
MCV RBC AUTO: 94.4 FL
MONOCYTES # BLD AUTO: 0.56 K/UL
MONOCYTES NFR BLD AUTO: 6.5 %
NEUTROPHILS # BLD AUTO: 6.04 K/UL
NEUTROPHILS NFR BLD AUTO: 69.9 %
NONHDLC SERPL-MCNC: 157 MG/DL
NT-PROBNP SERPL-MCNC: 1093 PG/ML
PLATELET # BLD AUTO: 229 K/UL
POTASSIUM SERPL-SCNC: 5.1 MMOL/L
PROT SERPL-MCNC: 7.3 G/DL
RBC # BLD: 5.19 M/UL
RBC # FLD: 13.1 %
SODIUM SERPL-SCNC: 141 MMOL/L
TIBC SERPL-MCNC: 388 UG/DL
TRIGL SERPL-MCNC: 212 MG/DL
TSH SERPL-ACNC: 1.06 UIU/ML
UIBC SERPL-MCNC: 261 UG/DL
WBC # FLD AUTO: 8.64 K/UL

## 2022-10-03 RX ORDER — SULFAMETHOXAZOLE AND TRIMETHOPRIM 800; 160 MG/1; MG/1
800-160 TABLET ORAL TWICE DAILY
Qty: 10 | Refills: 0 | Status: ACTIVE | COMMUNITY
Start: 2019-02-13 | End: 1900-01-01

## 2022-10-31 ENCOUNTER — RX RENEWAL (OUTPATIENT)
Age: 77
End: 2022-10-31

## 2022-11-03 ENCOUNTER — RX RENEWAL (OUTPATIENT)
Age: 77
End: 2022-11-03

## 2022-11-06 ENCOUNTER — RX RENEWAL (OUTPATIENT)
Age: 77
End: 2022-11-06

## 2023-02-10 ENCOUNTER — RX RENEWAL (OUTPATIENT)
Age: 78
End: 2023-02-10

## 2023-02-28 ENCOUNTER — RX RENEWAL (OUTPATIENT)
Age: 78
End: 2023-02-28

## 2023-04-27 ENCOUNTER — APPOINTMENT (OUTPATIENT)
Dept: CARDIOLOGY | Facility: CLINIC | Age: 78
End: 2023-04-27
Payer: MEDICARE

## 2023-04-27 ENCOUNTER — NON-APPOINTMENT (OUTPATIENT)
Age: 78
End: 2023-04-27

## 2023-04-27 VITALS
OXYGEN SATURATION: 97 % | DIASTOLIC BLOOD PRESSURE: 84 MMHG | BODY MASS INDEX: 28.57 KG/M2 | HEART RATE: 78 BPM | WEIGHT: 177 LBS | SYSTOLIC BLOOD PRESSURE: 160 MMHG

## 2023-04-27 VITALS — SYSTOLIC BLOOD PRESSURE: 128 MMHG | HEART RATE: 68 BPM | DIASTOLIC BLOOD PRESSURE: 80 MMHG

## 2023-04-27 DIAGNOSIS — J44.9 CHRONIC OBSTRUCTIVE PULMONARY DISEASE, UNSPECIFIED: ICD-10-CM

## 2023-04-27 PROCEDURE — 36415 COLL VENOUS BLD VENIPUNCTURE: CPT

## 2023-04-27 PROCEDURE — 99214 OFFICE O/P EST MOD 30 MIN: CPT

## 2023-04-27 PROCEDURE — 93000 ELECTROCARDIOGRAM COMPLETE: CPT

## 2023-04-27 NOTE — REASON FOR VISIT
[FreeTextEntry1] : Office visit for this 77-year-old woman with atrial fibrillation with difficult rate control and cardiomyopathy now having moved three hours away, found to have iron deficiency anemia, and then GI workup

## 2023-04-27 NOTE — REVIEW OF SYSTEMS
[Dyspnea on exertion] : dyspnea during exertion [Skin Lesions] : skin lesion(s): [Negative] : Heme/Lymph [Weight Gain (___ Lbs)] : no recent weight gain [Weight Loss (___ Lbs)] : [unfilled] ~Ulb weight loss [Chest Discomfort] : no chest discomfort [Lower Ext Edema] : no extremity edema [Palpitations] : no palpitations [Syncope] : no syncope [de-identified] : see HPI [de-identified] : Always somewhat anxious but improved overall

## 2023-04-27 NOTE — HISTORY OF PRESENT ILLNESS
[FreeTextEntry1] :  The patient had no previous cardiac history. She was seen 4 years prior at [facility] as preop for leg surgery after a fracture. She did tell the cardiologist about on-and-off palpitations. He found absolutely nothing and the workup was negative although she is not sure if she had a stress test or an echo at that time. She doesn't see doctors very often other than a pulmonologist for emphysema and a gastroenterologist for irritable bowel syndrome. She denied a history of hypertension, diabetes, hyperlipidemia and there was no family history of coronary artery disease. However she is smoking a pack a day. Complicating the issue is also a long history of panic attacks and anxiety that has kept her from doctor's visits and hospitalizations. She also been under a tremendous amount of stress recently due to a situation involving her daughter who lives [facility] and her expectation that her parents become full-time babysitters for them and move up there after an unexpected pregnancy.\par On 2013 she was having an endoscopy and she was found to be in rapid atrial fibrillation and sent to the emergency room. I saw her there and she achieved rate control with beta blockade, ruled out for myocardial infarction. An echocardiogram done the next morning was somewhat suboptimal despite IV echo contrast but overall LV function was mildly globally diminished with an ejection fraction by fractional shortening of 50% and a severely dilated left atrium. Obviously it is unclear how long she has been in the atrial fibrillation although she had seen an internist, Dr. Candido Barragan and has seen Dr. Beverly of pulmonary and Dr. Holt of GI and nobody had noted a rapid or irregular pulse. Due to her anxiety she was discharged the following morning on bisoprolol 10 mg for rate control,  Xarelto 20 mg for anticoagulation, her baseline Lexapro along with Xanax for anxiety. \par 2013. Here for outpatient followup. She has been feeling fine and denies chest pain or shortness of breath but still feels a lot of distress and is still in atrial fibrillation. Her heart rate was still somewhat rapid although she was asymptomatic. She was still smoking as well. I increased her beta blocker and discussed stopping smoking. She was told to come back in 2 weeks and then at that point we could start to think about cardioversion medically or electrically although she would need coronary artery disease ruled out first.\par 2013. She is here for followup on the higher dose of beta blocker. She feels perfectly fine and has no symptoms whatsoever. She is active. She has gained 1 pound. She was doing well and was scheduled for a stress echocardiogram. This was done on  and in fact she was found to have a significantly dilated cardiomyopathy with moderate global dysfunction. There was no evidence of ischemia although there was poor visualization of the endocardium. The heart rate response was excessive still. Based on this I changed her to carvedilol 25 mg b.i.d. and added ramipril 5 q.d.\par 2013. The patient is here for followup. Her main issue is that after she takes her morning medicines she gets severely nauseous. The only medicine that fits the bill and is not in the afternoon is the Altace. Otherwise she has been doing fairly well with maybe some mild orthostasis. She had one episode on a  where for about 45 seconds she felt flushed, lightheaded et cetera. This has not recurred. She knows when she gets her panic attacks her heart rate does go up but otherwise she thinks her heart rate has been better. I elected to stop her Altace and see if her nauseousness went away. I also decided to set her up for 3 weeks of CardioNet monitoring to rule out ventricular arrhythmias and to better assess her heart rate control in atrial fibrillation. \par 2014. The patient completed the CardioNet about a week or 2 ago. She had absolutely no ventricular tachycardia or even any significant number of VPCs. Her atrial fibrillation rate on the average was between 80 and 90 which is acceptable. They were times when she went well over 100. She is feeling fairly well and only notices a limitation in terms of shortness of breath when it is very cold outside. In the house and even going up stairs she has no symptoms. There has been no syncope or near-syncope and no nauseousness. Her panic attacks have been better and she is seeing her psychiatrist tomorrow. She is still struggling a lot with trying to stop smoking. We elected to give her 2 months more time and then repeat the echo and see if the LV function has improved with heart rate control. If not, we would then consider retrying an ACE or adding an ARB and setting her up for a defibrillator.\par 2014. The patient returns for followup and echocardiogram. She has occasional loose bowel movements and he did take Imodium on a twice a week. She denies exertional chest pain or shortness of breath or palpitations. She has been extremely anxious the last month about this appointment in terms of whether or not she needs a defibrillator. She would like to hold off unless it is actually necessary. In addition to a mixup with the schedule she had a very long wait in the office this morning and then on top of her usual anxiety in doctor's offices has caused her heart rate to be rather fast again. Her echo seemed even more suboptimal this time and had at least moderate systolic dysfunction. I elected to put her on Diovan 80 mg and have her come back in 2 months. Next is evaluation of LV function should be MUGA or MRI. The patient was urged to stop smoking.\par May 19, 2014. The patient is here for followup. Her breathing is worse with she and her son are attributing to the carvedilol and would like her switch to a cardioselective beta blocker. Not clear if she has any wheezing. In addition she has runny nose and dry mouth but she says that predated the Diovan. She is still anxious and has had panic attacks. She says she is trying to cut down on the smoking but hasn't had much success yet. There is no PND orthopnea or pedal edema.  She was convinced that her Coreg was causing shortness of breath because of her lung disease and so we switched her to bisoprolol. She felt "100 times better" however her labs showed abnormal LFTs which will be followed up next visit.\par 2014. The patient is here for followup. She initially remains tachycardic with atrial fibrillation. She still feels wonderful and all her family think she is doing wonderfully. She was convinced that she is back to normal and that her heart rate and blood pressure only elevated because she is coming to the doctor. Her LFTs were much better. In July she had gross hematuria which was caused by a UTI. Her Xarelto was held for one day and Bactrim started and asked her UTI resolved, the hematuria resolved and she went back on Xarelto.\par 2014. The patient is here for followup.  Once again she is tachycardic and hypertensive. Again she claims she has been feeling perfectly fine when she is not the office. Unfortunately she is still smoking. She has not gained weight no developed any pedal edema. She has not had any change in her exercise capacity although she admits she has become a couch potato. The labs were acceptable other than a nonfasting glucose of 133 and her SGPT came down to 73 with the SGOT normalized. Her  said he would buy an inexpensive stethoscope and follow her heart rate on his own.\par November 3, 2014. The patient called that for the last 2 weeks her heartbeat ranged from 74-98 with a mean of 84 and a median of 85. Based on that we will not change her medications. She is thinking of stopping the Lexapro as it was only given to help her stop smoking and has not helped. She said she will work on the smoking.\par 2015. Patient is here for followup. She is still in atrial fibrillation with the initial EKG having a heart rate greater than 150. After her blood work was done in the medical assistant repeated her vital signs her heart rate was down to 91 she is always nervous when she comes here that at some point we will call the ambulance and sent her to the hospital. Just recently she accompanied her sister to the hospital with possibly an abnormal heart rhythm and now the sister is scheduled for valve replacement. Patient is still smoking otherwise she feels well. We discussed her chronic anxiety disorder. She's not sure if she was doing better on Lexapro or not. She is taking Xanax 0.25 mg nightly. No complaints of chest pain shortness of breath syncope or near syncope. No complaints of palpitations. She has also developed a mild form of Raynaud's disease. In the cold her fingers get somewhat numb and turn a little white but they are not painful. We discussed the possibility of this being from her beta blocker but so far it is mild and tolerable. On further bursting she admits she gets more fatigue and maybe more short of breath than usual with exertion which he now blames on the couch potato orthopedic continued smoking.\par 2016. The patient is here for followup along with echocardiogram. She still has a pretty rapid heartbeat and atrial fibrillation and the echo is suboptimal but still shows significant LV dysfunction. Symptomatically she is fine. She felt that her Lexapro was increasing her heart rate so she stopped it and would like to try Wellbutrin. She is still smoking. She has been under a lot of stress as they sold their house and are moving to [place], but she would still like to come here to 3 times a year for followup. She had some issues with an ear infection that she got from her grandchildren and has no pain now but feels as if it is clogged on the left.  Again we had a long discussion about true heart rate controlled, true LVEF, and whether she is a candidate for defibrillator. We agreed to try increasing her bisoprolol to 15 mg and watch for both bronchospasm and Raynaud's, as well as at some point getting a nuclear MUGA scan to get a more accurate ejection fraction and then determine if she needs an AICD.\par 2015. The patient is here for followup. In the interim she had mild breakdown when she was unable to sell her house and move and is now on Celexa. Otherwise she has felt fine. She does get fatigued very easily. Denies shortness of breath. She had a MUGA scan at the [facility] and it showed ejection fraction 48% with global hypokinesis. He had initial EKG shows a heart rate of 103 which for her is pretty low. She thinks it's because I told her already she did not need a defibrillator. She also had a colonoscopy in the interim and some polyps were found.\par 2016. Patient is here for followup. On the whole doing well although she is nervous about her upcoming vacation ([place], [place]) because she is agoraphobic. She does feel some dyspnea on exertion and limited tolerance but no different than the past year or 2. No other complaints, no change in medications, no interval medical issues hospitalizations et cetera. After exam, I increased the valsartan 80 mg to 160 mg.\par 2016. Patient is here in followup. She is in good spirits, feels wonderful with no complaints. She is continuing to babysit her grandchild during the week. She had no problems with increasing the valsartan, including no worsening of her baseline allergic rhinorrhea. She thinks it might be from Celexa, and she cut back to 10 mg, but no change so far.\par 2016. Patient is seen in followup. Good spirits and more relaxed. Does admit she is more tired than usual, which he thinks is just because she is retired and not doing anything. She does admit to more shortness of breath going upstairs. She is still smoking. No chest pain. No change in medication or other medical interval issues.\par February 10, 2017. The patient is here in followup. Around  the patient was hospitalized with influenza and rapid atrial fibrillation and then transferred to rehabilitation at [facility]. She was discharged one week ago and is now on Cardizem  mg, metoprolol 50 t.i.d., and digoxin 0.125. She is off bisoprolol and valsartan. She is still on Xarelto 20. She has a wrist blood pressure monitor and her blood pressures at home have been /53-65 and all asymptomatic and her heart rates have been between 60 and 80 but it is just measuring her peripheral pulse by her wrist. She was instructed not to take metoprolol if her blood pressure was below 110 so on the average she is only taking 50 mg once a day. She is here today and her monitor reads a heart rate of 86, while her EKG and exam showed a heart rate between 120 and 130. The blood pressure was relatively accurate although I got a little bit higher blood pressure when I checked, in the 150 range. She has had only one episode of lightheadedness and that is getting up and walking after a long car ride. Unfortunately, she did not take out her blood pressure monitor and check it then. The rest of her exam was unremarkable and the EKG just has some mild ST depressions as in the past. After long discussion I had her take her metoprolol 50 mg q.12 h., no change in the other medications, not to check her blood pressure, pulse, unless she has another dizzy episode and she will keep her upcoming appointment in one month\par 3/21/2017. Patient returns in followup. She is now on metoprolol 50 b.i.d. She is still not smoking. She has been using Advair and thinks she has been feeling better, but she has not seen pulmonary on the outside. She had seen Dr. Beverly many years ago, and she liked him but the office was very hectic. She has not been checking her blood pressure on her own. Here in the office her blood pressure was excellent and her heart rate was in the 80s.\par 2017. Patient has 3 hours away, but wants to keep coming here for followup. Has not smoked in 6 months and 14 days. Not using any inhalers because she thought they were affecting her vision and she seems to be doing fine although cannot walk very far without getting short of breath. Heart rate control seems much better with digoxin. No interval hospitalizations or medical issues.\par 2017. Patient is here in followup. She is unhappy with her move as they got a dog that she can't stand but her  likes and she is a little overwhelmed by her grandchildren being just 3 minutes away. The family is concerned as the patient stayed inside for 8 consecutive days and did not want to go out. She does have a psychiatrist that both her  and she think is crazy. She is on Lexapro 20 mg. In addition, she has some orthostatic symptoms. She looks a little pale, but denies any melena or change in bowel habits or appetite. She does not seem volume depleted. She is on Cardizem  mg daily, digoxin, 0.125, and metoprolol 50 every 8 hours, but only takes it twice a day. Labs were sent, and I am changing her to metoprolol , which she will take at night while she leaves the Cardizem CD for the morning.  Labs came back the next day with a hemoglobin of 6.4. Discussed with patient the and urged that she go to the hospital or at least to a gastroenterologist.\par 2017. Had a GI workup. No active bleeding. One lesion in the rectum that seemed to be the culprit was cauterized and a polyp removed. She was admitted to the hospital initially and transfused 2 units. She was told now to come back for colonoscopy in 5 years.\par 2018. Patient here in followup. As far as she can tell, no recurrence of GI bleeding. She is on iron twice a day, so her stool is still black. She is doing well overall although was not happy with her psychiatrist and on her own cut back the Lexapro from 20 mg to 10 mg daily; and no difference so far. Heart rate seems controlled on her current regimen, which does include digoxin. She remains anticoagulated on Xarelto\par 2018. Patient here in followup. No further GI bleeding. Off her iron. No complaints. Reviewed all her medications. Stopped her inhalers as she did not think they were helping.\par 2018. Patient here in followup.Remains in atrial fibrillation with a ventricular rate of 83, and ST changes, consistent with digoxin. Blood pressure and heart rate excellent despite being here in the office and despite the long drive with traffic from [place]. Not using any inhalers and also not seeing pulmonary. Does have chronic shortness of breath. O2 sat today 97%\par 2019. Patient here in followup. In February had a UTI with gross hematuria that seemed to respond to Bactrim DS. Here she remains in atrial fibrillation with a ventricular rate of 80 and nonspecific ST changes on digoxin. No complaints except stuck in traffic on the way here. Weight is down 9 pounds, but she has cut back on munching on snacks such as potato chips pretzels etc.\par 2019. Patient here in followup. With the humidity she notices a little more trouble breathing, but still does not want to see a pulmonologist or take chronic medications unless she is convinced they will alter the natural history of her disease. I explained I cannot answer that, she really needs to speak with her pulmonologist. No cardiac complaints and no change in any of her medications.\par 2020.  Patient here in follow-up.  No complaints.  No interval medical issues.  No change in medication.  No smoking.  EKG with A. fib with ventricular rate of 90 and otherwise within normal limits other than usual minimal ST-depressions..  Worried in case she were to come down with the flu because she was very sick and almost  a few years ago from it and they take care of 3 kids who have a lot of children absent at times in their class because of the flu.  Discussed the pros and cons of going to urgent care for a swab etc.\par 2021.  First visit in over a year. ("I am the first doctor she called after she got vaccinated for COVID-19."))  After her visit last year she had an elevated hemoglobin and hematocrit and I recommended she see a pulmonologist but then Covid hit and she has been pretty much isolated the entire year, very careful until she is finally vaccinated.  No new complaints or symptoms.  Not smoking.  No change in medication.  Blood pressure initially very high but came down nicely.  EKG with A. fib with good rate control and with nonspecific ST changes probably from digoxin.  No bright red blood per rectum or dark black stool etc.\par 2021.  Patient returns for follow-up and echocardiogram.  Her echo while suboptimal as usual actually seems to have pretty much normal LV systolic function and just mild MR.  It does not seem to be different from an echo in 2017 done at the hospital.  Clinically she has no complaints of palpitations or shortness of breath although she did see a pulmonologist finally and is on an inhaler and doing a little better.  He is setting her up for a sleep study as well.  Her skin lesions which she calls sebaceous cysts, have increased especially 1 by the left side of her neck but she has not gone to see anybody about that.  Fully vaccinated and a booster for Covid.  (Her daughters boyfriend and his entire family came down with 12 cases of Covid right after Thanksgiving but thankfully all were vaccinated and none had to be hospitalized.)\par 2022.  First visit since last December.  EKG atrial fibrillation with a ventricular rate of 81 and ST depressions in leads I to III aVF V2 through 6 and elevation in aVR probably all digitalis effect; chest a little more prominent than the EKG back in December.  Initial blood pressure 200/79.  But did come down to 170/80 at the end.  She was at urgent care relatively recently and it was 142/80.  She finally decided to have the large sebaceous cyst on the left side of her neck removed but the dermatologist in her area had next appointment in 1 year.  She went to a physician at urgent care that she likes and he recommended that she see a plastic surgeon about this and she will see him and get it scheduled.  Her pulmonologist would like her to have a sleep study, I believe not a full apnea study but the study to see if she desaturates at night and then will be a candidate for nocturnal oxygen.  We discussed her new inhaler that she uses and she also has a rescue inhaler but has never had to use it.  Her shortness of breath with exertion seems to be about the same.  No chest pain, no edema, no palpitations.  Mood and spirits and anxiety level much much better these days.  She also brought up the question of Eliquis versus Xarelto and is willing to switch to Eliquis 5 mg twice daily.\par 2023.  Patient returns in follow-up.  Only interval medical issue was COVID around October and she and her  both got monoclonal antibody infusions instead of Paxlovid.  She has no complaints and has been stable in terms of her respiratory status, her weight etc.  Good spirits.  Is now on Eliquis twice a day without any complaints.  Her EKG today is atrial fibrillation with a ventricular rate of 74 and diffuse ST depressions consistent with digitalis effect.

## 2023-04-28 LAB
ALBUMIN SERPL ELPH-MCNC: 4.5 G/DL
ALP BLD-CCNC: 111 U/L
ALT SERPL-CCNC: 13 U/L
ANION GAP SERPL CALC-SCNC: 14 MMOL/L
AST SERPL-CCNC: 17 U/L
BASOPHILS # BLD AUTO: 0.04 K/UL
BASOPHILS NFR BLD AUTO: 0.5 %
BILIRUB SERPL-MCNC: 0.5 MG/DL
BUN SERPL-MCNC: 21 MG/DL
CALCIUM SERPL-MCNC: 10.7 MG/DL
CHLORIDE SERPL-SCNC: 102 MMOL/L
CHOLEST SERPL-MCNC: 212 MG/DL
CO2 SERPL-SCNC: 26 MMOL/L
CREAT SERPL-MCNC: 0.91 MG/DL
DIGOXIN SERPL-MCNC: 1.4 NG/ML
EGFR: 65 ML/MIN/1.73M2
EOSINOPHIL # BLD AUTO: 0.04 K/UL
EOSINOPHIL NFR BLD AUTO: 0.5 %
ESTIMATED AVERAGE GLUCOSE: 126 MG/DL
HBA1C MFR BLD HPLC: 6 %
HCT VFR BLD CALC: 54.9 %
HDLC SERPL-MCNC: 48 MG/DL
HGB BLD-MCNC: 17.7 G/DL
IMM GRANULOCYTES NFR BLD AUTO: 0.4 %
LDLC SERPL CALC-MCNC: 129 MG/DL
LYMPHOCYTES # BLD AUTO: 2.08 K/UL
LYMPHOCYTES NFR BLD AUTO: 25.8 %
MAN DIFF?: NORMAL
MCHC RBC-ENTMCNC: 30.9 PG
MCHC RBC-ENTMCNC: 32.2 GM/DL
MCV RBC AUTO: 96 FL
MONOCYTES # BLD AUTO: 0.55 K/UL
MONOCYTES NFR BLD AUTO: 6.8 %
NEUTROPHILS # BLD AUTO: 5.32 K/UL
NEUTROPHILS NFR BLD AUTO: 66 %
NONHDLC SERPL-MCNC: 164 MG/DL
NT-PROBNP SERPL-MCNC: 698 PG/ML
PLATELET # BLD AUTO: 239 K/UL
POTASSIUM SERPL-SCNC: 5.3 MMOL/L
PROT SERPL-MCNC: 7.7 G/DL
RBC # BLD: 5.72 M/UL
RBC # FLD: 13.5 %
SODIUM SERPL-SCNC: 141 MMOL/L
TRIGL SERPL-MCNC: 176 MG/DL
TSH SERPL-ACNC: 1.31 UIU/ML
WBC # FLD AUTO: 8.06 K/UL

## 2023-05-28 ENCOUNTER — RX RENEWAL (OUTPATIENT)
Age: 78
End: 2023-05-28

## 2023-05-29 ENCOUNTER — RX RENEWAL (OUTPATIENT)
Age: 78
End: 2023-05-29

## 2023-08-06 ENCOUNTER — RX RENEWAL (OUTPATIENT)
Age: 78
End: 2023-08-06

## 2023-08-28 ENCOUNTER — RX RENEWAL (OUTPATIENT)
Age: 78
End: 2023-08-28

## 2023-08-29 ENCOUNTER — RX RENEWAL (OUTPATIENT)
Age: 78
End: 2023-08-29

## 2023-11-03 ENCOUNTER — RX RENEWAL (OUTPATIENT)
Age: 78
End: 2023-11-03

## 2023-11-16 ENCOUNTER — APPOINTMENT (OUTPATIENT)
Dept: CARDIOLOGY | Facility: CLINIC | Age: 78
End: 2023-11-16

## 2023-11-26 ENCOUNTER — RX RENEWAL (OUTPATIENT)
Age: 78
End: 2023-11-26

## 2023-11-27 ENCOUNTER — RX RENEWAL (OUTPATIENT)
Age: 78
End: 2023-11-27

## 2024-01-07 ENCOUNTER — RX RENEWAL (OUTPATIENT)
Age: 79
End: 2024-01-07

## 2024-02-02 ENCOUNTER — RX RENEWAL (OUTPATIENT)
Age: 79
End: 2024-02-02

## 2024-02-22 ENCOUNTER — RX RENEWAL (OUTPATIENT)
Age: 79
End: 2024-02-22

## 2024-03-14 ENCOUNTER — APPOINTMENT (OUTPATIENT)
Dept: CARDIOLOGY | Facility: CLINIC | Age: 79
End: 2024-03-14
Payer: MEDICARE

## 2024-03-14 ENCOUNTER — NON-APPOINTMENT (OUTPATIENT)
Age: 79
End: 2024-03-14

## 2024-03-14 VITALS — SYSTOLIC BLOOD PRESSURE: 130 MMHG | HEART RATE: 72 BPM | DIASTOLIC BLOOD PRESSURE: 76 MMHG

## 2024-03-14 VITALS
SYSTOLIC BLOOD PRESSURE: 145 MMHG | WEIGHT: 182 LBS | HEART RATE: 75 BPM | BODY MASS INDEX: 29.38 KG/M2 | DIASTOLIC BLOOD PRESSURE: 85 MMHG | OXYGEN SATURATION: 95 %

## 2024-03-14 DIAGNOSIS — E78.00 PURE HYPERCHOLESTEROLEMIA, UNSPECIFIED: ICD-10-CM

## 2024-03-14 DIAGNOSIS — I10 ESSENTIAL (PRIMARY) HYPERTENSION: ICD-10-CM

## 2024-03-14 DIAGNOSIS — I48.21 PERMANENT ATRIAL FIBRILLATION: ICD-10-CM

## 2024-03-14 DIAGNOSIS — I42.9 CARDIOMYOPATHY, UNSPECIFIED: ICD-10-CM

## 2024-03-14 PROCEDURE — 99214 OFFICE O/P EST MOD 30 MIN: CPT

## 2024-03-14 PROCEDURE — 93000 ELECTROCARDIOGRAM COMPLETE: CPT

## 2024-03-14 PROCEDURE — G2211 COMPLEX E/M VISIT ADD ON: CPT

## 2024-03-14 NOTE — PHYSICAL EXAM
[General Appearance - Well Developed] : well developed [Normal Appearance] : normal appearance [Well Groomed] : well groomed [General Appearance - Well Nourished] : well nourished [No Deformities] : no deformities [General Appearance - In No Acute Distress] : no acute distress [Normal Conjunctiva] : the conjunctiva exhibited no abnormalities [Eyelids - No Xanthelasma] : the eyelids demonstrated no xanthelasmas [Normal Oral Mucosa] : normal oral mucosa [No Oral Pallor] : no oral pallor [No Oral Cyanosis] : no oral cyanosis [Normal Jugular Venous V Waves Present] : normal jugular venous V waves present [No Jugular Venous June A Waves] : no jugular venous june A waves [Respiration, Rhythm And Depth] : normal respiratory rhythm and effort [Exaggerated Use Of Accessory Muscles For Inspiration] : no accessory muscle use [Auscultation Breath Sounds / Voice Sounds] : lungs were clear to auscultation bilaterally [Heart Sounds] : normal S1 and S2 [Heart Rate And Rhythm] : heart rate and rhythm were normal [Murmurs] : no murmurs present [Abdomen Soft] : soft [Abdomen Tenderness] : non-tender [Abdomen Mass (___ Cm)] : no abdominal mass palpated [Abnormal Walk] : normal gait [Gait - Sufficient For Exercise Testing] : the gait was sufficient for exercise testing [Nail Clubbing] : no clubbing of the fingernails [Cyanosis, Localized] : no localized cyanosis [Petechial Hemorrhages (___cm)] : no petechial hemorrhages [] : no ischemic changes [No Venous Stasis] : no venous stasis [Skin Lesions] : no skin lesions [No Skin Ulcers] : no skin ulcer [No Xanthoma] : no  xanthoma was observed [Oriented To Time, Place, And Person] : oriented to person, place, and time [Affect] : the affect was normal [Mood] : the mood was normal [FreeTextEntry1] : Much less anxious, cheerful, not depressed

## 2024-03-14 NOTE — REASON FOR VISIT
[FreeTextEntry1] : Office visit for this 78-year-old woman with atrial fibrillation with difficult rate control and cardiomyopathy now having moved three hours away, found to have iron deficiency anemia, and then GI workup

## 2024-03-14 NOTE — DISCUSSION/SUMMARY
[FreeTextEntry1] : Labs sent, hemoglobin A1c, lipids, proBNP., including digoxin level.  Tentative follow-up 6 months with or without echo depending on BNP.. [EKG obtained to assist in diagnosis and management of assessed problem(s)] : EKG obtained to assist in diagnosis and management of assessed problem(s)

## 2024-03-14 NOTE — HISTORY OF PRESENT ILLNESS
[FreeTextEntry1] :  The patient had no previous cardiac history. She was seen 4 years prior at [facility] as preop for leg surgery after a fracture. She did tell the cardiologist about on-and-off palpitations. He found absolutely nothing and the workup was negative although she is not sure if she had a stress test or an echo at that time. She doesn't see doctors very often other than a pulmonologist for emphysema and a gastroenterologist for irritable bowel syndrome. She denied a history of hypertension, diabetes, hyperlipidemia and there was no family history of coronary artery disease. However she is smoking a pack a day. Complicating the issue is also a long history of panic attacks and anxiety that has kept her from doctor's visits and hospitalizations. She also been under a tremendous amount of stress recently due to a situation involving her daughter who lives [facility] and her expectation that her parents become full-time babysitters for them and move up there after an unexpected pregnancy. On 2013 she was having an endoscopy and she was found to be in rapid atrial fibrillation and sent to the emergency room. I saw her there and she achieved rate control with beta blockade, ruled out for myocardial infarction. An echocardiogram done the next morning was somewhat suboptimal despite IV echo contrast but overall LV function was mildly globally diminished with an ejection fraction by fractional shortening of 50% and a severely dilated left atrium. Obviously it is unclear how long she has been in the atrial fibrillation although she had seen an internist, Dr. Candido Barragan and has seen Dr. Beverly of pulmonary and Dr. Holt of GI and nobody had noted a rapid or irregular pulse. Due to her anxiety she was discharged the following morning on bisoprolol 10 mg for rate control,  Xarelto 20 mg for anticoagulation, her baseline Lexapro along with Xanax for anxiety.  2013. Here for outpatient followup. She has been feeling fine and denies chest pain or shortness of breath but still feels a lot of distress and is still in atrial fibrillation. Her heart rate was still somewhat rapid although she was asymptomatic. She was still smoking as well. I increased her beta blocker and discussed stopping smoking. She was told to come back in 2 weeks and then at that point we could start to think about cardioversion medically or electrically although she would need coronary artery disease ruled out first. 2013. She is here for followup on the higher dose of beta blocker. She feels perfectly fine and has no symptoms whatsoever. She is active. She has gained 1 pound. She was doing well and was scheduled for a stress echocardiogram. This was done on  and in fact she was found to have a significantly dilated cardiomyopathy with moderate global dysfunction. There was no evidence of ischemia although there was poor visualization of the endocardium. The heart rate response was excessive still. Based on this I changed her to carvedilol 25 mg b.i.d. and added ramipril 5 q.d. 2013. The patient is here for followup. Her main issue is that after she takes her morning medicines she gets severely nauseous. The only medicine that fits the bill and is not in the afternoon is the Altace. Otherwise she has been doing fairly well with maybe some mild orthostasis. She had one episode on a  where for about 45 seconds she felt flushed, lightheaded et cetera. This has not recurred. She knows when she gets her panic attacks her heart rate does go up but otherwise she thinks her heart rate has been better. I elected to stop her Altace and see if her nauseousness went away. I also decided to set her up for 3 weeks of CardioNet monitoring to rule out ventricular arrhythmias and to better assess her heart rate control in atrial fibrillation.  2014. The patient completed the CardioNet about a week or 2 ago. She had absolutely no ventricular tachycardia or even any significant number of VPCs. Her atrial fibrillation rate on the average was between 80 and 90 which is acceptable. They were times when she went well over 100. She is feeling fairly well and only notices a limitation in terms of shortness of breath when it is very cold outside. In the house and even going up stairs she has no symptoms. There has been no syncope or near-syncope and no nauseousness. Her panic attacks have been better and she is seeing her psychiatrist tomorrow. She is still struggling a lot with trying to stop smoking. We elected to give her 2 months more time and then repeat the echo and see if the LV function has improved with heart rate control. If not, we would then consider retrying an ACE or adding an ARB and setting her up for a defibrillator. 2014. The patient returns for followup and echocardiogram. She has occasional loose bowel movements and he did take Imodium on a twice a week. She denies exertional chest pain or shortness of breath or palpitations. She has been extremely anxious the last month about this appointment in terms of whether or not she needs a defibrillator. She would like to hold off unless it is actually necessary. In addition to a mixup with the schedule she had a very long wait in the office this morning and then on top of her usual anxiety in doctor's offices has caused her heart rate to be rather fast again. Her echo seemed even more suboptimal this time and had at least moderate systolic dysfunction. I elected to put her on Diovan 80 mg and have her come back in 2 months. Next is evaluation of LV function should be MUGA or MRI. The patient was urged to stop smoking. May 19, 2014. The patient is here for followup. Her breathing is worse with she and her son are attributing to the carvedilol and would like her switch to a cardioselective beta blocker. Not clear if she has any wheezing. In addition she has runny nose and dry mouth but she says that predated the Diovan. She is still anxious and has had panic attacks. She says she is trying to cut down on the smoking but hasn't had much success yet. There is no PND orthopnea or pedal edema.  She was convinced that her Coreg was causing shortness of breath because of her lung disease and so we switched her to bisoprolol. She felt "100 times better" however her labs showed abnormal LFTs which will be followed up next visit. 2014. The patient is here for followup. She initially remains tachycardic with atrial fibrillation. She still feels wonderful and all her family think she is doing wonderfully. She was convinced that she is back to normal and that her heart rate and blood pressure only elevated because she is coming to the doctor. Her LFTs were much better. In July she had gross hematuria which was caused by a UTI. Her Xarelto was held for one day and Bactrim started and asked her UTI resolved, the hematuria resolved and she went back on Xarelto. 2014. The patient is here for followup.  Once again she is tachycardic and hypertensive. Again she claims she has been feeling perfectly fine when she is not the office. Unfortunately she is still smoking. She has not gained weight no developed any pedal edema. She has not had any change in her exercise capacity although she admits she has become a couch potato. The labs were acceptable other than a nonfasting glucose of 133 and her SGPT came down to 73 with the SGOT normalized. Her  said he would buy an inexpensive stethoscope and follow her heart rate on his own. November 3, 2014. The patient called that for the last 2 weeks her heartbeat ranged from 74-98 with a mean of 84 and a median of 85. Based on that we will not change her medications. She is thinking of stopping the Lexapro as it was only given to help her stop smoking and has not helped. She said she will work on the smoking. 2015. Patient is here for followup. She is still in atrial fibrillation with the initial EKG having a heart rate greater than 150. After her blood work was done in the medical assistant repeated her vital signs her heart rate was down to 91 she is always nervous when she comes here that at some point we will call the ambulance and sent her to the hospital. Just recently she accompanied her sister to the hospital with possibly an abnormal heart rhythm and now the sister is scheduled for valve replacement. Patient is still smoking otherwise she feels well. We discussed her chronic anxiety disorder. She's not sure if she was doing better on Lexapro or not. She is taking Xanax 0.25 mg nightly. No complaints of chest pain shortness of breath syncope or near syncope. No complaints of palpitations. She has also developed a mild form of Raynaud's disease. In the cold her fingers get somewhat numb and turn a little white but they are not painful. We discussed the possibility of this being from her beta blocker but so far it is mild and tolerable. On further bursting she admits she gets more fatigue and maybe more short of breath than usual with exertion which he now blames on the couch potato orthopedic continued smoking. 2016. The patient is here for followup along with echocardiogram. She still has a pretty rapid heartbeat and atrial fibrillation and the echo is suboptimal but still shows significant LV dysfunction. Symptomatically she is fine. She felt that her Lexapro was increasing her heart rate so she stopped it and would like to try Wellbutrin. She is still smoking. She has been under a lot of stress as they sold their house and are moving to [place], but she would still like to come here to 3 times a year for followup. She had some issues with an ear infection that she got from her grandchildren and has no pain now but feels as if it is clogged on the left.  Again we had a long discussion about true heart rate controlled, true LVEF, and whether she is a candidate for defibrillator. We agreed to try increasing her bisoprolol to 15 mg and watch for both bronchospasm and Raynaud's, as well as at some point getting a nuclear MUGA scan to get a more accurate ejection fraction and then determine if she needs an AICD. 2015. The patient is here for followup. In the interim she had mild breakdown when she was unable to sell her house and move and is now on Celexa. Otherwise she has felt fine. She does get fatigued very easily. Denies shortness of breath. She had a MUGA scan at the [facility] and it showed ejection fraction 48% with global hypokinesis. He had initial EKG shows a heart rate of 103 which for her is pretty low. She thinks it's because I told her already she did not need a defibrillator. She also had a colonoscopy in the interim and some polyps were found. 2016. Patient is here for followup. On the whole doing well although she is nervous about her upcoming vacation ([place], [place]) because she is agoraphobic. She does feel some dyspnea on exertion and limited tolerance but no different than the past year or 2. No other complaints, no change in medications, no interval medical issues hospitalizations et cetera. After exam, I increased the valsartan 80 mg to 160 mg. 2016. Patient is here in followup. She is in good spirits, feels wonderful with no complaints. She is continuing to babysit her grandchild during the week. She had no problems with increasing the valsartan, including no worsening of her baseline allergic rhinorrhea. She thinks it might be from Celexa, and she cut back to 10 mg, but no change so far. 2016. Patient is seen in followup. Good spirits and more relaxed. Does admit she is more tired than usual, which he thinks is just because she is retired and not doing anything. She does admit to more shortness of breath going upstairs. She is still smoking. No chest pain. No change in medication or other medical interval issues. February 10, 2017. The patient is here in followup. Around  the patient was hospitalized with influenza and rapid atrial fibrillation and then transferred to rehabilitation at [facility]. She was discharged one week ago and is now on Cardizem  mg, metoprolol 50 t.i.d., and digoxin 0.125. She is off bisoprolol and valsartan. She is still on Xarelto 20. She has a wrist blood pressure monitor and her blood pressures at home have been /53-65 and all asymptomatic and her heart rates have been between 60 and 80 but it is just measuring her peripheral pulse by her wrist. She was instructed not to take metoprolol if her blood pressure was below 110 so on the average she is only taking 50 mg once a day. She is here today and her monitor reads a heart rate of 86, while her EKG and exam showed a heart rate between 120 and 130. The blood pressure was relatively accurate although I got a little bit higher blood pressure when I checked, in the 150 range. She has had only one episode of lightheadedness and that is getting up and walking after a long car ride. Unfortunately, she did not take out her blood pressure monitor and check it then. The rest of her exam was unremarkable and the EKG just has some mild ST depressions as in the past. After long discussion I had her take her metoprolol 50 mg q.12 h., no change in the other medications, not to check her blood pressure, pulse, unless she has another dizzy episode and she will keep her upcoming appointment in one month 3/21/2017. Patient returns in followup. She is now on metoprolol 50 b.i.d. She is still not smoking. She has been using Advair and thinks she has been feeling better, but she has not seen pulmonary on the outside. She had seen Dr. Beverly many years ago, and she liked him but the office was very hectic. She has not been checking her blood pressure on her own. Here in the office her blood pressure was excellent and her heart rate was in the 80s. 2017. Patient has 3 hours away, but wants to keep coming here for followup. Has not smoked in 6 months and 14 days. Not using any inhalers because she thought they were affecting her vision and she seems to be doing fine although cannot walk very far without getting short of breath. Heart rate control seems much better with digoxin. No interval hospitalizations or medical issues. 2017. Patient is here in followup. She is unhappy with her move as they got a dog that she can't stand but her  likes and she is a little overwhelmed by her grandchildren being just 3 minutes away. The family is concerned as the patient stayed inside for 8 consecutive days and did not want to go out. She does have a psychiatrist that both her  and she think is crazy. She is on Lexapro 20 mg. In addition, she has some orthostatic symptoms. She looks a little pale, but denies any melena or change in bowel habits or appetite. She does not seem volume depleted. She is on Cardizem  mg daily, digoxin, 0.125, and metoprolol 50 every 8 hours, but only takes it twice a day. Labs were sent, and I am changing her to metoprolol , which she will take at night while she leaves the Cardizem CD for the morning.  Labs came back the next day with a hemoglobin of 6.4. Discussed with patient the and urged that she go to the hospital or at least to a gastroenterologist. 2017. Had a GI workup. No active bleeding. One lesion in the rectum that seemed to be the culprit was cauterized and a polyp removed. She was admitted to the hospital initially and transfused 2 units. She was told now to come back for colonoscopy in 5 years. 2018. Patient here in followup. As far as she can tell, no recurrence of GI bleeding. She is on iron twice a day, so her stool is still black. She is doing well overall although was not happy with her psychiatrist and on her own cut back the Lexapro from 20 mg to 10 mg daily; and no difference so far. Heart rate seems controlled on her current regimen, which does include digoxin. She remains anticoagulated on Xarelto 2018. Patient here in followup. No further GI bleeding. Off her iron. No complaints. Reviewed all her medications. Stopped her inhalers as she did not think they were helping. 2018. Patient here in followup.Remains in atrial fibrillation with a ventricular rate of 83, and ST changes, consistent with digoxin. Blood pressure and heart rate excellent despite being here in the office and despite the long drive with traffic from [place]. Not using any inhalers and also not seeing pulmonary. Does have chronic shortness of breath. O2 sat today 97% 2019. Patient here in followup. In February had a UTI with gross hematuria that seemed to respond to Bactrim DS. Here she remains in atrial fibrillation with a ventricular rate of 80 and nonspecific ST changes on digoxin. No complaints except stuck in traffic on the way here. Weight is down 9 pounds, but she has cut back on munching on snacks such as potato chips pretzels etc. 2019. Patient here in followup. With the humidity she notices a little more trouble breathing, but still does not want to see a pulmonologist or take chronic medications unless she is convinced they will alter the natural history of her disease. I explained I cannot answer that, she really needs to speak with her pulmonologist. No cardiac complaints and no change in any of her medications. 2020.  Patient here in follow-up.  No complaints.  No interval medical issues.  No change in medication.  No smoking.  EKG with A. fib with ventricular rate of 90 and otherwise within normal limits other than usual minimal ST-depressions..  Worried in case she were to come down with the flu because she was very sick and almost  a few years ago from it and they take care of 3 kids who have a lot of children absent at times in their class because of the flu.  Discussed the pros and cons of going to urgent care for a swab etc. 2021.  First visit in over a year. ("I am the first doctor she called after she got vaccinated for COVID-19."))  After her visit last year she had an elevated hemoglobin and hematocrit and I recommended she see a pulmonologist but then Covid hit and she has been pretty much isolated the entire year, very careful until she is finally vaccinated.  No new complaints or symptoms.  Not smoking.  No change in medication.  Blood pressure initially very high but came down nicely.  EKG with A. fib with good rate control and with nonspecific ST changes probably from digoxin.  No bright red blood per rectum or dark black stool etc. 2021.  Patient returns for follow-up and echocardiogram.  Her echo while suboptimal as usual actually seems to have pretty much normal LV systolic function and just mild MR.  It does not seem to be different from an echo in 2017 done at the hospital.  Clinically she has no complaints of palpitations or shortness of breath although she did see a pulmonologist finally and is on an inhaler and doing a little better.  He is setting her up for a sleep study as well.  Her skin lesions which she calls sebaceous cysts, have increased especially 1 by the left side of her neck but she has not gone to see anybody about that.  Fully vaccinated and a booster for Covid.  (Her daughters boyfriend and his entire family came down with 12 cases of Covid right after Thanksgiving but thankfully all were vaccinated and none had to be hospitalized.) 2022.  First visit since last December.  EKG atrial fibrillation with a ventricular rate of 81 and ST depressions in leads I to III aVF V2 through 6 and elevation in aVR probably all digitalis effect; chest a little more prominent than the EKG back in December.  Initial blood pressure 200/79.  But did come down to 170/80 at the end.  She was at urgent care relatively recently and it was 142/80.  She finally decided to have the large sebaceous cyst on the left side of her neck removed but the dermatologist in her area had next appointment in 1 year.  She went to a physician at urgent care that she likes and he recommended that she see a plastic surgeon about this and she will see him and get it scheduled.  Her pulmonologist would like her to have a sleep study, I believe not a full apnea study but the study to see if she desaturates at night and then will be a candidate for nocturnal oxygen.  We discussed her new inhaler that she uses and she also has a rescue inhaler but has never had to use it.  Her shortness of breath with exertion seems to be about the same.  No chest pain, no edema, no palpitations.  Mood and spirits and anxiety level much much better these days.  She also brought up the question of Eliquis versus Xarelto and is willing to switch to Eliquis 5 mg twice daily. 2023.  Patient returns in follow-up.  Only interval medical issue was COVID around October and she and her  both got monoclonal antibody infusions instead of Paxlovid.  She has no complaints and has been stable in terms of her respiratory status, her weight etc.  Good spirits.  Is now on Eliquis twice a day without any complaints.  Her EKG today is atrial fibrillation with a ventricular rate of 74 and diffuse ST depressions consistent with digitalis effect. 2024.  First visit since  of last year.  Weight is up 5 pounds and initial blood pressure slightly high   But excellent on repeat at the end of the exam.. EKG remains with atrial fibrillation with a ventricular rate of 68 and with some ST depressions consistent with digitalis effect.. No interval hospitalizations or urgent care or procedures.  Her dermatologic issues progress but they want a plastic surgeon to remove the larger ones not a dermatologist and she keeps putting it off.  Her last echo was 2021 so unless there is a change in her proBNP she will probably just come back for a clinical visit in 6 months. Good spirits, some issues with her oldest granddaughter but she does have another granddaughter who is having a sweet 16 tomorrow and then they will be celebrating [employer] Day etc.

## 2024-03-14 NOTE — REVIEW OF SYSTEMS
[Dyspnea on exertion] : dyspnea during exertion [Skin Lesions] : skin lesion(s): [Negative] : Heme/Lymph [Weight Gain (___ Lbs)] : [unfilled] ~Ulb weight gain [Feeling Fatigued] : not feeling fatigued [Weight Loss (___ Lbs)] : no recent weight loss [Chest Discomfort] : no chest discomfort [Lower Ext Edema] : no extremity edema [Palpitations] : no palpitations [Syncope] : no syncope [de-identified] : see HPI [de-identified] : Always somewhat anxious but improved overall

## 2024-03-14 NOTE — ASSESSMENT
[FreeTextEntry1] : (In October, 2014 the patient had a MUGA scan with LVEF 48% and global hypokinesis. When I told her that she does not need a defibrillator she became much more relaxed, and in fact her heart rate was only 103 and a blood pressure was normal.) (She was hospitalized about 43 months ago with influenza and rapid atrial fibrillation which finally resolved and she was transferred to rehabilitation. Her medications were changed to Cardizem , digoxin, 0.125 q.d., and metoprolol 50 t.i.d. but she was only taking it once a day and I changed her last visit to twice a day. Her only one dizzy episode seems to be an orthostatic episode after a long car drive.)  Doing well overall. No more GI bleeding. No symptoms of CHF. Currently on metoprolol  at night, Cardizem , digoxin, 0.125 in AM, Xarelto 20. EKG with ST depressions more noticeable than last EKG but probably still all digoxin effect as patient is asymptomatic. Heart rate control is good. Patient in good spirits.  LV function appears to be normal on echo 12/2021 although endocardial definition is suboptimal.  Probably a result of chronic heart rate control finally, perhaps the digoxin and perhaps the beta-blocker.  Last year labs had a hemoglobin of 16.6 and hematocrit of 52.6 and I recommended she see pulmonary which she finally did and is now on an inhaler with improvement.  Is awaiting a sleep study.  I am concerned about her skin lesions which she says she has had for very long time and there is a small mass on the left side of her neck which she seemed to be ignoring and I have urged her to see a dermatologist at least at some point.  Now she was told it is a large sebaceous cyst and is being referred to a plastic surgeon.  If they want clearance for removal I will be happy to do so.  Repeat labs sent and she will definitely continue to see the pulmonologist; I encouraged her to have a sleep study which I believe is just to see whether she desaturates at night and should have nocturnal oxygen.. Her blood pressure here was very elevated but slightly better on repeat exam, and her heart rate was in the 80s (66 on ECG, around 70 during the echo in December, 2021). Her exam is unremarkable, no wheezing but not great air movement although better on the left than previously. She is tolerating the anticoagulation with Xarelto but now would like to switch to Eliquis which is fine with me and she should be on 5 mg every 12 hours. There is no CHF on exam. The metoprolol so far has not affected her emphysema or her Raynaud's. (There was no evidence of high grade ventricular ectopy on home monitoring.) She is not smoking for 70 plus months. She did not complain of chronic rhinitis today. (She had chronic rhinitis which she attributes to her Celexa; she is also off her Diovan now  She also remains on Xanax 0.25 mg nightly.) No more orthostasis since I changed her metoprolol to succinate 100 mg, which she takes at night, while the diltiazem will be in the morning. I advised her to wait when she changes positions. No complaints of orthostasis today. She has not been happy with the psychiatrist. She remains on Lexapro but lowered the dose to 10 mg by herself, and Xanax. From a cardiac point of view she actually seems to be doing pretty well. She still travels from near [place] to get here.  Returns today March 14, 2024.  Stable from a cardiac point of view, labs will be sent including digoxin level.  If she remains stable she can come back in 6 months but she was hesitant to do another echo; if her proBNP has not really changed then we will hold off as it will be 3 years coming December.  But if the proBNP has changed significantly she will agree to an echo.

## 2024-03-15 LAB
ALBUMIN SERPL ELPH-MCNC: 4.3 G/DL
ALP BLD-CCNC: 99 U/L
ALT SERPL-CCNC: 17 U/L
ANION GAP SERPL CALC-SCNC: 15 MMOL/L
AST SERPL-CCNC: 15 U/L
BILIRUB SERPL-MCNC: 0.5 MG/DL
BUN SERPL-MCNC: 19 MG/DL
CALCIUM SERPL-MCNC: 9.6 MG/DL
CHLORIDE SERPL-SCNC: 101 MMOL/L
CHOLEST SERPL-MCNC: 187 MG/DL
CO2 SERPL-SCNC: 24 MMOL/L
CREAT SERPL-MCNC: 0.87 MG/DL
DIGOXIN SERPL-MCNC: 2 NG/ML
EGFR: 69 ML/MIN/1.73M2
ESTIMATED AVERAGE GLUCOSE: 120 MG/DL
GLUCOSE SERPL-MCNC: 92 MG/DL
HBA1C MFR BLD HPLC: 5.8 %
HCT VFR BLD CALC: 52.2 %
HDLC SERPL-MCNC: 46 MG/DL
HGB BLD-MCNC: 16.6 G/DL
LDLC SERPL CALC-MCNC: 112 MG/DL
MCHC RBC-ENTMCNC: 30.1 PG
MCHC RBC-ENTMCNC: 31.8 GM/DL
MCV RBC AUTO: 94.7 FL
NONHDLC SERPL-MCNC: 140 MG/DL
NT-PROBNP SERPL-MCNC: 1198 PG/ML
PLATELET # BLD AUTO: 219 K/UL
POTASSIUM SERPL-SCNC: 4.4 MMOL/L
PROT SERPL-MCNC: 7.1 G/DL
RBC # BLD: 5.51 M/UL
RBC # FLD: 13.3 %
SODIUM SERPL-SCNC: 140 MMOL/L
TRIGL SERPL-MCNC: 158 MG/DL
TSH SERPL-ACNC: 1.52 UIU/ML
WBC # FLD AUTO: 8.89 K/UL

## 2024-04-10 NOTE — PROVIDER CONTACT NOTE (OTHER) - ACTION/TREATMENT ORDERED:
Detail Level: Generalized Metoprolol 25 milligrams 1 dose given orally as ordered. Detail Level: Zone

## 2024-05-01 ENCOUNTER — RX RENEWAL (OUTPATIENT)
Age: 79
End: 2024-05-01

## 2024-05-02 ENCOUNTER — RX RENEWAL (OUTPATIENT)
Age: 79
End: 2024-05-02

## 2024-05-22 ENCOUNTER — RX RENEWAL (OUTPATIENT)
Age: 79
End: 2024-05-22

## 2024-05-22 RX ORDER — METOPROLOL SUCCINATE 100 MG/1
100 TABLET, EXTENDED RELEASE ORAL
Qty: 90 | Refills: 0 | Status: ACTIVE | COMMUNITY
Start: 2017-12-05 | End: 1900-01-01

## 2024-05-22 RX ORDER — APIXABAN 5 MG/1
5 TABLET, FILM COATED ORAL
Qty: 180 | Refills: 1 | Status: ACTIVE | COMMUNITY
Start: 2022-08-17 | End: 1900-01-01

## 2024-05-22 RX ORDER — DILTIAZEM HYDROCHLORIDE 240 MG/1
240 CAPSULE, EXTENDED RELEASE ORAL
Qty: 90 | Refills: 0 | Status: ACTIVE | COMMUNITY
Start: 2017-03-02 | End: 1900-01-01

## 2024-05-22 RX ORDER — DIGOXIN 125 UG/1
125 TABLET ORAL
Qty: 90 | Refills: 0 | Status: ACTIVE | COMMUNITY
Start: 2017-03-02 | End: 1900-01-01

## 2024-05-22 RX ORDER — ESCITALOPRAM OXALATE 10 MG/1
10 TABLET ORAL
Qty: 90 | Refills: 0 | Status: ACTIVE | COMMUNITY
Start: 2018-03-12 | End: 1900-01-01

## 2024-07-17 NOTE — ED ADULT NURSE NOTE - THOUGHTS OF HOMICIDE/VIOLENCE TOWARDS OTHERS YN, MLM
2024      Mya Hui  [address]  [facility] [address]      Dear Colleague,    Thank you for referring your patient, Mya Hui, to the [facility] [place]. Please see a copy of my visit note below.    Mya Hui  :  1941  DOS: 2024  MRN: [id number]    Sports Medicine Clinic Procedure    Ultrasound Guided Right Intra-Articular Knee Injection, +/- Aspiration    Clinical History: Primary osteoarthritis of right knee    Diagnosis:   1. Primary osteoarthritis of right knee      Large Joint Injection/Arthocentesis: R knee joint    Date/Time: 2024 3:01 PM    Performed by: Fareed Sorenson DO  Authorized by: Fareed Sorenson DO    Indications:  Osteoarthritis  Needle Size:  22 G  Guidance: ultrasound    Approach:  Anterolateral  Location:  Knee      Medications:  40 mg triamcinolone 40 MG/ML; 2 mL lidocaine 1 %; 2 mL BUPivacaine 0.5 %  Outcome:  Tolerated well, no immediate complications  Procedure discussed: discussed risks, benefits, and alternatives    Consent Given by:  Patient  Prep: patient was prepped and draped in usual sterile fashion     Ultrasound images of procedure were permanently stored.     Ultrasound Guided Intra-articular Knee Injection  The knee was prepped and draped in a sterile manner.  Ultrasound identification of the patella, suprapatellar pouch, quadriceps tendon and femur in both long and short axis was obtained. The probe was placed in short axis to the femur. A 1.5 inch 22 gauge needle was placed under ultrasound guidance into the superior knee joint using a lateral approach.  A mixture of 2 mL of 1% lidocaine, 2 mL of 0.5% bupivacaine and 1 ml kenalog (40mg/ml) was injected without difficulty. The needle was removed and there was good hemostasis without complications.  Patient tolerated procedure well.  There was ultrasound documentation of needle placement and injection.      Impression:  Successful  ultrasound-guided intra-articular right knee joint corticosteroid injection.    Plan:  - Injection:    - Expectations and goals of the injection were discussed and verbal and written consent was obtained.  - Performed the above procedure today in clinic. Patient tolerated the procedure well without complications.    - Post-procedure instructions:    - Keep the injection site clean and dry.   - Do not submerge the injection site for 24 hours (no baths, pools). Showers are ok.   - Rest the area for 24-48 hours before resuming normal activities. Avoid overexerting the area for the first few weeks.   - It may take 2-3 days to start noticing the effects of the injection and up to 3-4 weeks to feel significant benefits.   - Follow up:          -In 3+ months as needed for reevaluation and for updates to treatment plan, or sooner for new/worsening symptoms.  - Patient has clinic contact information for questions or concerns.      Fareed Sorenson DO CAQSM  [facility]  [facility] - Department of Orthopedic Surgery     Disclaimer:  This note was prepared and written using Dragon Medical dictation software. As a result, there may be errors in the script that have gone undetected. Please consider this when interpreting the information in this note.       Again, thank you for allowing me to participate in the care of your patient.        Sincerely,        Fareed Sorenson DO   No

## 2024-09-12 ENCOUNTER — APPOINTMENT (OUTPATIENT)
Dept: CARDIOLOGY | Facility: CLINIC | Age: 79
End: 2024-09-12
Payer: MEDICARE

## 2024-09-12 ENCOUNTER — NON-APPOINTMENT (OUTPATIENT)
Age: 79
End: 2024-09-12

## 2024-09-12 VITALS
WEIGHT: 184 LBS | OXYGEN SATURATION: 94 % | SYSTOLIC BLOOD PRESSURE: 150 MMHG | BODY MASS INDEX: 29.7 KG/M2 | DIASTOLIC BLOOD PRESSURE: 76 MMHG | HEART RATE: 60 BPM

## 2024-09-12 DIAGNOSIS — I42.9 CARDIOMYOPATHY, UNSPECIFIED: ICD-10-CM

## 2024-09-12 DIAGNOSIS — E78.00 PURE HYPERCHOLESTEROLEMIA, UNSPECIFIED: ICD-10-CM

## 2024-09-12 DIAGNOSIS — I10 ESSENTIAL (PRIMARY) HYPERTENSION: ICD-10-CM

## 2024-09-12 DIAGNOSIS — I48.21 PERMANENT ATRIAL FIBRILLATION: ICD-10-CM

## 2024-09-12 PROCEDURE — 93306 TTE W/DOPPLER COMPLETE: CPT

## 2024-09-12 PROCEDURE — 99214 OFFICE O/P EST MOD 30 MIN: CPT

## 2024-09-12 PROCEDURE — G2211 COMPLEX E/M VISIT ADD ON: CPT

## 2024-09-12 PROCEDURE — 93000 ELECTROCARDIOGRAM COMPLETE: CPT

## 2024-09-12 NOTE — REASON FOR VISIT
[FreeTextEntry1] : Office visit for this 79-year-old woman with atrial fibrillation with difficult rate control and cardiomyopathy now having moved three hours away, found to have iron deficiency anemia, and then GI workup

## 2024-09-12 NOTE — DISCUSSION/SUMMARY
[FreeTextEntry1] : Continues to be stable and doing well from a cardiac point of view.  Will continue current medications of labs okay including digoxin level.  Okay to follow-up in 6 months if no new symptoms or findings arise.  Still a little phobic about aggressive workup for coronary disease or other tests but so far clinically stable [EKG obtained to assist in diagnosis and management of assessed problem(s)] : EKG obtained to assist in diagnosis and management of assessed problem(s)

## 2024-09-12 NOTE — HISTORY OF PRESENT ILLNESS
[FreeTextEntry1] :  The patient had no previous cardiac history. She was seen 4 years prior at [facility] as preop for leg surgery after a fracture. She did tell the cardiologist about on-and-off palpitations. He found absolutely nothing and the workup was negative although she is not sure if she had a stress test or an echo at that time. She doesn't see doctors very often other than a pulmonologist for emphysema and a gastroenterologist for irritable bowel syndrome. She denied a history of hypertension, diabetes, hyperlipidemia and there was no family history of coronary artery disease. However she is smoking a pack a day. Complicating the issue is also a long history of panic attacks and anxiety that has kept her from doctor's visits and hospitalizations. She also been under a tremendous amount of stress recently due to a situation involving her daughter who lives [facility] and her expectation that her parents become full-time babysitters for them and move up there after an unexpected pregnancy. On 2013 she was having an endoscopy and she was found to be in rapid atrial fibrillation and sent to the emergency room. I saw her there and she achieved rate control with beta blockade, ruled out for myocardial infarction. An echocardiogram done the next morning was somewhat suboptimal despite IV echo contrast but overall LV function was mildly globally diminished with an ejection fraction by fractional shortening of 50% and a severely dilated left atrium. Obviously it is unclear how long she has been in the atrial fibrillation although she had seen an internist, Dr. Candido Barragan and has seen Dr. Beverly of pulmonary and Dr. Holt of GI and nobody had noted a rapid or irregular pulse. Due to her anxiety she was discharged the following morning on bisoprolol 10 mg for rate control,  Xarelto 20 mg for anticoagulation, her baseline Lexapro along with Xanax for anxiety.  2013. Here for outpatient followup. She has been feeling fine and denies chest pain or shortness of breath but still feels a lot of distress and is still in atrial fibrillation. Her heart rate was still somewhat rapid although she was asymptomatic. She was still smoking as well. I increased her beta blocker and discussed stopping smoking. She was told to come back in 2 weeks and then at that point we could start to think about cardioversion medically or electrically although she would need coronary artery disease ruled out first. 2013. She is here for followup on the higher dose of beta blocker. She feels perfectly fine and has no symptoms whatsoever. She is active. She has gained 1 pound. She was doing well and was scheduled for a stress echocardiogram. This was done on  and in fact she was found to have a significantly dilated cardiomyopathy with moderate global dysfunction. There was no evidence of ischemia although there was poor visualization of the endocardium. The heart rate response was excessive still. Based on this I changed her to carvedilol 25 mg b.i.d. and added ramipril 5 q.d. 2013. The patient is here for followup. Her main issue is that after she takes her morning medicines she gets severely nauseous. The only medicine that fits the bill and is not in the afternoon is the Altace. Otherwise she has been doing fairly well with maybe some mild orthostasis. She had one episode on a  where for about 45 seconds she felt flushed, lightheaded et cetera. This has not recurred. She knows when she gets her panic attacks her heart rate does go up but otherwise she thinks her heart rate has been better. I elected to stop her Altace and see if her nauseousness went away. I also decided to set her up for 3 weeks of CardioNet monitoring to rule out ventricular arrhythmias and to better assess her heart rate control in atrial fibrillation.  2014. The patient completed the CardioNet about a week or 2 ago. She had absolutely no ventricular tachycardia or even any significant number of VPCs. Her atrial fibrillation rate on the average was between 80 and 90 which is acceptable. They were times when she went well over 100. She is feeling fairly well and only notices a limitation in terms of shortness of breath when it is very cold outside. In the house and even going up stairs she has no symptoms. There has been no syncope or near-syncope and no nauseousness. Her panic attacks have been better and she is seeing her psychiatrist tomorrow. She is still struggling a lot with trying to stop smoking. We elected to give her 2 months more time and then repeat the echo and see if the LV function has improved with heart rate control. If not, we would then consider retrying an ACE or adding an ARB and setting her up for a defibrillator. 2014. The patient returns for followup and echocardiogram. She has occasional loose bowel movements and he did take Imodium on a twice a week. She denies exertional chest pain or shortness of breath or palpitations. She has been extremely anxious the last month about this appointment in terms of whether or not she needs a defibrillator. She would like to hold off unless it is actually necessary. In addition to a mixup with the schedule she had a very long wait in the office this morning and then on top of her usual anxiety in doctor's offices has caused her heart rate to be rather fast again. Her echo seemed even more suboptimal this time and had at least moderate systolic dysfunction. I elected to put her on Diovan 80 mg and have her come back in 2 months. Next is evaluation of LV function should be MUGA or MRI. The patient was urged to stop smoking. May 19, 2014. The patient is here for followup. Her breathing is worse with she and her son are attributing to the carvedilol and would like her switch to a cardioselective beta blocker. Not clear if she has any wheezing. In addition she has runny nose and dry mouth but she says that predated the Diovan. She is still anxious and has had panic attacks. She says she is trying to cut down on the smoking but hasn't had much success yet. There is no PND orthopnea or pedal edema.  She was convinced that her Coreg was causing shortness of breath because of her lung disease and so we switched her to bisoprolol. She felt "100 times better" however her labs showed abnormal LFTs which will be followed up next visit. 2014. The patient is here for followup. She initially remains tachycardic with atrial fibrillation. She still feels wonderful and all her family think she is doing wonderfully. She was convinced that she is back to normal and that her heart rate and blood pressure only elevated because she is coming to the doctor. Her LFTs were much better. In July she had gross hematuria which was caused by a UTI. Her Xarelto was held for one day and Bactrim started and asked her UTI resolved, the hematuria resolved and she went back on Xarelto. 2014. The patient is here for followup.  Once again she is tachycardic and hypertensive. Again she claims she has been feeling perfectly fine when she is not the office. Unfortunately she is still smoking. She has not gained weight no developed any pedal edema. She has not had any change in her exercise capacity although she admits she has become a couch potato. The labs were acceptable other than a nonfasting glucose of 133 and her SGPT came down to 73 with the SGOT normalized. Her  said he would buy an inexpensive stethoscope and follow her heart rate on his own. November 3, 2014. The patient called that for the last 2 weeks her heartbeat ranged from 74-98 with a mean of 84 and a median of 85. Based on that we will not change her medications. She is thinking of stopping the Lexapro as it was only given to help her stop smoking and has not helped. She said she will work on the smoking. 2015. Patient is here for followup. She is still in atrial fibrillation with the initial EKG having a heart rate greater than 150. After her blood work was done in the medical assistant repeated her vital signs her heart rate was down to 91 she is always nervous when she comes here that at some point we will call the ambulance and sent her to the hospital. Just recently she accompanied her sister to the hospital with possibly an abnormal heart rhythm and now the sister is scheduled for valve replacement. Patient is still smoking otherwise she feels well. We discussed her chronic anxiety disorder. She's not sure if she was doing better on Lexapro or not. She is taking Xanax 0.25 mg nightly. No complaints of chest pain shortness of breath syncope or near syncope. No complaints of palpitations. She has also developed a mild form of Raynaud's disease. In the cold her fingers get somewhat numb and turn a little white but they are not painful. We discussed the possibility of this being from her beta blocker but so far it is mild and tolerable. On further bursting she admits she gets more fatigue and maybe more short of breath than usual with exertion which he now blames on the couch potato orthopedic continued smoking. 2016. The patient is here for followup along with echocardiogram. She still has a pretty rapid heartbeat and atrial fibrillation and the echo is suboptimal but still shows significant LV dysfunction. Symptomatically she is fine. She felt that her Lexapro was increasing her heart rate so she stopped it and would like to try Wellbutrin. She is still smoking. She has been under a lot of stress as they sold their house and are moving to [place], but she would still like to come here to 3 times a year for followup. She had some issues with an ear infection that she got from her grandchildren and has no pain now but feels as if it is clogged on the left.  Again we had a long discussion about true heart rate controlled, true LVEF, and whether she is a candidate for defibrillator. We agreed to try increasing her bisoprolol to 15 mg and watch for both bronchospasm and Raynaud's, as well as at some point getting a nuclear MUGA scan to get a more accurate ejection fraction and then determine if she needs an AICD. 2015. The patient is here for followup. In the interim she had mild breakdown when she was unable to sell her house and move and is now on Celexa. Otherwise she has felt fine. She does get fatigued very easily. Denies shortness of breath. She had a MUGA scan at the [facility] and it showed ejection fraction 48% with global hypokinesis. He had initial EKG shows a heart rate of 103 which for her is pretty low. She thinks it's because I told her already she did not need a defibrillator. She also had a colonoscopy in the interim and some polyps were found. 2016. Patient is here for followup. On the whole doing well although she is nervous about her upcoming vacation ([place], [place]) because she is agoraphobic. She does feel some dyspnea on exertion and limited tolerance but no different than the past year or 2. No other complaints, no change in medications, no interval medical issues hospitalizations et cetera. After exam, I increased the valsartan 80 mg to 160 mg. 2016. Patient is here in followup. She is in good spirits, feels wonderful with no complaints. She is continuing to babysit her grandchild during the week. She had no problems with increasing the valsartan, including no worsening of her baseline allergic rhinorrhea. She thinks it might be from Celexa, and she cut back to 10 mg, but no change so far. 2016. Patient is seen in followup. Good spirits and more relaxed. Does admit she is more tired than usual, which he thinks is just because she is retired and not doing anything. She does admit to more shortness of breath going upstairs. She is still smoking. No chest pain. No change in medication or other medical interval issues. February 10, 2017. The patient is here in followup. Around  the patient was hospitalized with influenza and rapid atrial fibrillation and then transferred to rehabilitation at [facility]. She was discharged one week ago and is now on Cardizem  mg, metoprolol 50 t.i.d., and digoxin 0.125. She is off bisoprolol and valsartan. She is still on Xarelto 20. She has a wrist blood pressure monitor and her blood pressures at home have been /53-65 and all asymptomatic and her heart rates have been between 60 and 80 but it is just measuring her peripheral pulse by her wrist. She was instructed not to take metoprolol if her blood pressure was below 110 so on the average she is only taking 50 mg once a day. She is here today and her monitor reads a heart rate of 86, while her EKG and exam showed a heart rate between 120 and 130. The blood pressure was relatively accurate although I got a little bit higher blood pressure when I checked, in the 150 range. She has had only one episode of lightheadedness and that is getting up and walking after a long car ride. Unfortunately, she did not take out her blood pressure monitor and check it then. The rest of her exam was unremarkable and the EKG just has some mild ST depressions as in the past. After long discussion I had her take her metoprolol 50 mg q.12 h., no change in the other medications, not to check her blood pressure, pulse, unless she has another dizzy episode and she will keep her upcoming appointment in one month 3/21/2017. Patient returns in followup. She is now on metoprolol 50 b.i.d. She is still not smoking. She has been using Advair and thinks she has been feeling better, but she has not seen pulmonary on the outside. She had seen Dr. Beverly many years ago, and she liked him but the office was very hectic. She has not been checking her blood pressure on her own. Here in the office her blood pressure was excellent and her heart rate was in the 80s. 2017. Patient has 3 hours away, but wants to keep coming here for followup. Has not smoked in 6 months and 14 days. Not using any inhalers because she thought they were affecting her vision and she seems to be doing fine although cannot walk very far without getting short of breath. Heart rate control seems much better with digoxin. No interval hospitalizations or medical issues. 2017. Patient is here in followup. She is unhappy with her move as they got a dog that she can't stand but her  likes and she is a little overwhelmed by her grandchildren being just 3 minutes away. The family is concerned as the patient stayed inside for 8 consecutive days and did not want to go out. She does have a psychiatrist that both her  and she think is crazy. She is on Lexapro 20 mg. In addition, she has some orthostatic symptoms. She looks a little pale, but denies any melena or change in bowel habits or appetite. She does not seem volume depleted. She is on Cardizem  mg daily, digoxin, 0.125, and metoprolol 50 every 8 hours, but only takes it twice a day. Labs were sent, and I am changing her to metoprolol , which she will take at night while she leaves the Cardizem CD for the morning.  Labs came back the next day with a hemoglobin of 6.4. Discussed with patient the and urged that she go to the hospital or at least to a gastroenterologist. 2017. Had a GI workup. No active bleeding. One lesion in the rectum that seemed to be the culprit was cauterized and a polyp removed. She was admitted to the hospital initially and transfused 2 units. She was told now to come back for colonoscopy in 5 years. 2018. Patient here in followup. As far as she can tell, no recurrence of GI bleeding. She is on iron twice a day, so her stool is still black. She is doing well overall although was not happy with her psychiatrist and on her own cut back the Lexapro from 20 mg to 10 mg daily; and no difference so far. Heart rate seems controlled on her current regimen, which does include digoxin. She remains anticoagulated on Xarelto 2018. Patient here in followup. No further GI bleeding. Off her iron. No complaints. Reviewed all her medications. Stopped her inhalers as she did not think they were helping. 2018. Patient here in followup.Remains in atrial fibrillation with a ventricular rate of 83, and ST changes, consistent with digoxin. Blood pressure and heart rate excellent despite being here in the office and despite the long drive with traffic from [place]. Not using any inhalers and also not seeing pulmonary. Does have chronic shortness of breath. O2 sat today 97% 2019. Patient here in followup. In February had a UTI with gross hematuria that seemed to respond to Bactrim DS. Here she remains in atrial fibrillation with a ventricular rate of 80 and nonspecific ST changes on digoxin. No complaints except stuck in traffic on the way here. Weight is down 9 pounds, but she has cut back on munching on snacks such as potato chips pretzels etc. 2019. Patient here in followup. With the humidity she notices a little more trouble breathing, but still does not want to see a pulmonologist or take chronic medications unless she is convinced they will alter the natural history of her disease. I explained I cannot answer that, she really needs to speak with her pulmonologist. No cardiac complaints and no change in any of her medications. 2020.  Patient here in follow-up.  No complaints.  No interval medical issues.  No change in medication.  No smoking.  EKG with A. fib with ventricular rate of 90 and otherwise within normal limits other than usual minimal ST-depressions..  Worried in case she were to come down with the flu because she was very sick and almost  a few years ago from it and they take care of 3 kids who have a lot of children absent at times in their class because of the flu.  Discussed the pros and cons of going to urgent care for a swab etc. 2021.  First visit in over a year. ("I am the first doctor she called after she got vaccinated for COVID-19."))  After her visit last year she had an elevated hemoglobin and hematocrit and I recommended she see a pulmonologist but then Covid hit and she has been pretty much isolated the entire year, very careful until she is finally vaccinated.  No new complaints or symptoms.  Not smoking.  No change in medication.  Blood pressure initially very high but came down nicely.  EKG with A. fib with good rate control and with nonspecific ST changes probably from digoxin.  No bright red blood per rectum or dark black stool etc. 2021.  Patient returns for follow-up and echocardiogram.  Her echo while suboptimal as usual actually seems to have pretty much normal LV systolic function and just mild MR.  It does not seem to be different from an echo in 2017 done at the hospital.  Clinically she has no complaints of palpitations or shortness of breath although she did see a pulmonologist finally and is on an inhaler and doing a little better.  He is setting her up for a sleep study as well.  Her skin lesions which she calls sebaceous cysts, have increased especially 1 by the left side of her neck but she has not gone to see anybody about that.  Fully vaccinated and a booster for Covid.  (Her daughters boyfriend and his entire family came down with 12 cases of Covid right after Thanksgiving but thankfully all were vaccinated and none had to be hospitalized.) 2022.  First visit since last December.  EKG atrial fibrillation with a ventricular rate of 81 and ST depressions in leads I to III aVF V2 through 6 and elevation in aVR probably all digitalis effect; chest a little more prominent than the EKG back in December.  Initial blood pressure 200/79.  But did come down to 170/80 at the end.  She was at urgent care relatively recently and it was 142/80.  She finally decided to have the large sebaceous cyst on the left side of her neck removed but the dermatologist in her area had next appointment in 1 year.  She went to a physician at urgent care that she likes and he recommended that she see a plastic surgeon about this and she will see him and get it scheduled.  Her pulmonologist would like her to have a sleep study, I believe not a full apnea study but the study to see if she desaturates at night and then will be a candidate for nocturnal oxygen.  We discussed her new inhaler that she uses and she also has a rescue inhaler but has never had to use it.  Her shortness of breath with exertion seems to be about the same.  No chest pain, no edema, no palpitations.  Mood and spirits and anxiety level much much better these days.  She also brought up the question of Eliquis versus Xarelto and is willing to switch to Eliquis 5 mg twice daily. 2023.  Patient returns in follow-up.  Only interval medical issue was COVID around October and she and her  both got monoclonal antibody infusions instead of Paxlovid.  She has no complaints and has been stable in terms of her respiratory status, her weight etc.  Good spirits.  Is now on Eliquis twice a day without any complaints.  Her EKG today is atrial fibrillation with a ventricular rate of 74 and diffuse ST depressions consistent with digitalis effect. 2024.  First visit since  of last year.  Weight is up 5 pounds and initial blood pressure slightly high   But excellent on repeat at the end of the exam.. EKG remains with atrial fibrillation with a ventricular rate of 68 and with some ST depressions consistent with digitalis effect.. No interval hospitalizations or urgent care or procedures.  Her dermatologic issues progress but they want a plastic surgeon to remove the larger ones not a dermatologist and she keeps putting it off.  Her last echo was 2021 so unless there is a change in her proBNP she will probably just come back for a clinical visit in 6 months. Good spirits, some issues with her oldest granddaughter but she does have another granddaughter who is having a sweet 16 tomorrow and then they will be celebrating [employer] Day etc.  2024.  Patient returns for her 6-month follow-up and for echocardiogram.  Not too much traffic driving all the way down here.  She denies any interval medical or cardiac issues, no hospitals emergency room or urgent care and no change in medication.  No new COVID episodes.  Remains in atrial fibrillation with good rate control on digoxin.

## 2024-09-12 NOTE — REVIEW OF SYSTEMS
[Weight Gain (___ Lbs)] : no recent weight gain [Feeling Fatigued] : not feeling fatigued [Weight Loss (___ Lbs)] : no recent weight loss [Dyspnea on exertion] : dyspnea during exertion [Chest Discomfort] : no chest discomfort [Lower Ext Edema] : no extremity edema [Palpitations] : no palpitations [Syncope] : no syncope [Skin Lesions] : skin lesion(s): [Negative] : Heme/Lymph [de-identified] : see HPI [de-identified] : Always somewhat anxious but improved overall

## 2024-09-12 NOTE — ASSESSMENT
[FreeTextEntry1] : (In October, 2014 the patient had a MUGA scan with LVEF 48% and global hypokinesis. When I told her that she does not need a defibrillator she became much more relaxed, and in fact her heart rate was only 103 and a blood pressure was normal.) (She was hospitalized about 43 months ago with influenza and rapid atrial fibrillation which finally resolved and she was transferred to rehabilitation. Her medications were changed to Cardizem , digoxin, 0.125 q.d., and metoprolol 50 t.i.d. but she was only taking it once a day and I changed her last visit to twice a day. Her only one dizzy episode seems to be an orthostatic episode after a long car drive.)  Doing well overall. No more GI bleeding. No symptoms of CHF. Currently on metoprolol  at night, Cardizem , digoxin, 0.125 in AM, Xarelto 20. EKG with ST depressions more noticeable than last EKG but probably still all digoxin effect as patient is asymptomatic. Heart rate control is good. Patient in good spirits. LV function appears to be normal on echo 12/2021 although endocardial definition is suboptimal. Probably a result of chronic heart rate control finally, perhaps the digoxin and perhaps the beta-blocker. Last year labs had a hemoglobin of 16.6 and hematocrit of 52.6 and I recommended she see pulmonary which she finally did and is now on an inhaler with improvement. Is awaiting a sleep study. I am concerned about her skin lesions which she says she has had for very long time and there is a small mass on the left side of her neck which she seemed to be ignoring and I have urged her to see a dermatologist at least at some point. Now she was told it is a large sebaceous cyst and is being referred to a plastic surgeon. If they want clearance for removal I will be happy to do so. Repeat labs sent and she will definitely continue to see the pulmonologist; I encouraged her to have a sleep study which I believe is just to see whether she desaturates at night and should have nocturnal oxygen.. Her blood pressure here was very elevated but slightly better on repeat exam, and her heart rate was in the 80s (66 on ECG, around 70 during the echo in December, 2021). Her exam is unremarkable, no wheezing but not great air movement although better on the left than previously. She is tolerating the anticoagulation with Xarelto but now would like to switch to Eliquis which is fine with me and she should be on 5 mg every 12 hours. There is no CHF on exam. The metoprolol so far has not affected her emphysema or her Raynaud's. (There was no evidence of high grade ventricular ectopy on home monitoring.) She is not smoking for 70 plus months. She did not complain of chronic rhinitis today. (She had chronic rhinitis which she attributes to her Celexa; she is also off her Diovan now She also remains on Xanax 0.25 mg nightly.) No more orthostasis since I changed her metoprolol to succinate 100 mg, which she takes at night, while the diltiazem will be in the morning. I advised her to wait when she changes positions. No complaints of orthostasis today. She has not been happy with the psychiatrist. She remains on Lexapro but lowered the dose to 10 mg by herself, and Xanax. From a cardiac point of view she actually seems to be doing pretty well. She still travels from near [place] to get here. Returned March 14, 2024. Stable from a cardiac point of view, labs will be sent including digoxin level. If she remains stable she can come back in 6 months but she was hesitant to do another echo; if her proBNP has not really changed then we will hold off as it will be 3 years coming December. But if the proBNP has changed significantly she will agree to an echo. (proBNP had gone up to 1198)  She returns today September 12, 2024 for follow-up visit and for echocardiogram.  In fact her echocardiogram was for the most part unremarkable with probably normal systolic function although endocardium not that well-seen and only mild MR and no significant change.  Clinically she remains in A-fib but with good rate control on the digoxin and just has some digitalis effect changes.  No fluid overload on exam.  Good spirits.  Blood pressure initially high and I forgot to recheck at the end but usually comes down.  Much more relaxed in the early days.

## 2024-09-12 NOTE — PHYSICAL EXAM
[General Appearance - Well Developed] : well developed [Normal Appearance] : normal appearance [Well Groomed] : well groomed [General Appearance - Well Nourished] : well nourished [No Deformities] : no deformities [General Appearance - In No Acute Distress] : no acute distress [Normal Conjunctiva] : the conjunctiva exhibited no abnormalities [Eyelids - No Xanthelasma] : the eyelids demonstrated no xanthelasmas [Normal Oral Mucosa] : normal oral mucosa [No Oral Pallor] : no oral pallor [No Oral Cyanosis] : no oral cyanosis [Normal Jugular Venous V Waves Present] : normal jugular venous V waves present [No Jugular Venous June A Waves] : no jugular venous june A waves [Exaggerated Use Of Accessory Muscles For Inspiration] : no accessory muscle use [Respiration, Rhythm And Depth] : normal respiratory rhythm and effort [Auscultation Breath Sounds / Voice Sounds] : lungs were clear to auscultation bilaterally [Heart Rate And Rhythm] : heart rate and rhythm were normal [Heart Sounds] : normal S1 and S2 [Murmurs] : no murmurs present [Abdomen Soft] : soft [Abdomen Tenderness] : non-tender [Abdomen Mass (___ Cm)] : no abdominal mass palpated [Abnormal Walk] : normal gait [Gait - Sufficient For Exercise Testing] : the gait was sufficient for exercise testing [Nail Clubbing] : no clubbing of the fingernails [Cyanosis, Localized] : no localized cyanosis [Petechial Hemorrhages (___cm)] : no petechial hemorrhages [] : no rash [No Venous Stasis] : no venous stasis [Skin Lesions] : no skin lesions [No Skin Ulcers] : no skin ulcer [No Xanthoma] : no  xanthoma was observed [Oriented To Time, Place, And Person] : oriented to person, place, and time [Affect] : the affect was normal [Mood] : the mood was normal [FreeTextEntry1] : Much less anxious, cheerful, not depressed

## 2024-09-15 LAB
ALBUMIN SERPL ELPH-MCNC: 4.3 G/DL
ALP BLD-CCNC: 106 U/L
ALT SERPL-CCNC: 14 U/L
ANION GAP SERPL CALC-SCNC: 14 MMOL/L
AST SERPL-CCNC: 18 U/L
BASOPHILS # BLD AUTO: 0.04 K/UL
BASOPHILS NFR BLD AUTO: 0.6 %
BILIRUB SERPL-MCNC: 0.4 MG/DL
BUN SERPL-MCNC: 17 MG/DL
CALCIUM SERPL-MCNC: 9.9 MG/DL
CHLORIDE SERPL-SCNC: 102 MMOL/L
CHOLEST SERPL-MCNC: 173 MG/DL
CO2 SERPL-SCNC: 25 MMOL/L
CREAT SERPL-MCNC: 0.91 MG/DL
DIGOXIN SERPL-MCNC: 1.1 NG/ML
EGFR: 65 ML/MIN/1.73M2
EOSINOPHIL # BLD AUTO: 0.03 K/UL
EOSINOPHIL NFR BLD AUTO: 0.4 %
ESTIMATED AVERAGE GLUCOSE: 123 MG/DL
FERRITIN SERPL-MCNC: 205 NG/ML
GLUCOSE SERPL-MCNC: 110 MG/DL
HBA1C MFR BLD HPLC: 5.9 %
HCT VFR BLD CALC: 52.7 %
HDLC SERPL-MCNC: 47 MG/DL
HGB BLD-MCNC: 17 G/DL
IMM GRANULOCYTES NFR BLD AUTO: 0.7 %
IRON SATN MFR SERPL: 27 %
IRON SERPL-MCNC: 103 UG/DL
LDLC SERPL CALC-MCNC: 103 MG/DL
LYMPHOCYTES # BLD AUTO: 1.58 K/UL
LYMPHOCYTES NFR BLD AUTO: 21.8 %
MAN DIFF?: NORMAL
MCHC RBC-ENTMCNC: 31.5 PG
MCHC RBC-ENTMCNC: 32.3 GM/DL
MCV RBC AUTO: 97.6 FL
MONOCYTES # BLD AUTO: 0.59 K/UL
MONOCYTES NFR BLD AUTO: 8.1 %
NEUTROPHILS # BLD AUTO: 4.95 K/UL
NEUTROPHILS NFR BLD AUTO: 68.4 %
NONHDLC SERPL-MCNC: 126 MG/DL
NT-PROBNP SERPL-MCNC: 1152 PG/ML
PLATELET # BLD AUTO: 209 K/UL
POTASSIUM SERPL-SCNC: 5.2 MMOL/L
PROT SERPL-MCNC: 7.1 G/DL
RBC # BLD: 5.4 M/UL
RBC # FLD: 14.2 %
SODIUM SERPL-SCNC: 141 MMOL/L
TIBC SERPL-MCNC: 377 UG/DL
TRIGL SERPL-MCNC: 133 MG/DL
TSH SERPL-ACNC: 1.11 UIU/ML
UIBC SERPL-MCNC: 274 UG/DL
WBC # FLD AUTO: 7.24 K/UL

## 2024-10-05 ENCOUNTER — RX RENEWAL (OUTPATIENT)
Age: 79
End: 2024-10-05

## 2024-10-30 ENCOUNTER — RX RENEWAL (OUTPATIENT)
Age: 79
End: 2024-10-30

## 2024-11-02 ENCOUNTER — RX RENEWAL (OUTPATIENT)
Age: 79
End: 2024-11-02

## 2024-11-09 ENCOUNTER — NON-APPOINTMENT (OUTPATIENT)
Age: 79
End: 2024-11-09

## 2024-11-18 ENCOUNTER — RX RENEWAL (OUTPATIENT)
Age: 79
End: 2024-11-18

## 2024-12-04 ENCOUNTER — NON-APPOINTMENT (OUTPATIENT)
Age: 79
End: 2024-12-04

## 2025-01-02 ENCOUNTER — RX RENEWAL (OUTPATIENT)
Age: 80
End: 2025-01-02

## 2025-01-06 ENCOUNTER — RX RENEWAL (OUTPATIENT)
Age: 80
End: 2025-01-06

## 2025-01-21 ENCOUNTER — NON-APPOINTMENT (OUTPATIENT)
Age: 80
End: 2025-01-21

## 2025-01-30 ENCOUNTER — RX RENEWAL (OUTPATIENT)
Age: 80
End: 2025-01-30

## 2025-02-13 ENCOUNTER — RX RENEWAL (OUTPATIENT)
Age: 80
End: 2025-02-13

## 2025-03-31 ENCOUNTER — RX RENEWAL (OUTPATIENT)
Age: 80
End: 2025-03-31

## 2025-04-14 ENCOUNTER — RX RENEWAL (OUTPATIENT)
Age: 80
End: 2025-04-14

## 2025-04-22 ENCOUNTER — NON-APPOINTMENT (OUTPATIENT)
Age: 80
End: 2025-04-22

## 2025-04-22 ENCOUNTER — APPOINTMENT (OUTPATIENT)
Dept: CARDIOLOGY | Facility: CLINIC | Age: 80
End: 2025-04-22
Payer: MEDICARE

## 2025-04-22 VITALS
DIASTOLIC BLOOD PRESSURE: 74 MMHG | SYSTOLIC BLOOD PRESSURE: 136 MMHG | HEART RATE: 58 BPM | OXYGEN SATURATION: 93 % | BODY MASS INDEX: 28.89 KG/M2 | WEIGHT: 179 LBS

## 2025-04-22 DIAGNOSIS — J44.9 CHRONIC OBSTRUCTIVE PULMONARY DISEASE, UNSPECIFIED: ICD-10-CM

## 2025-04-22 DIAGNOSIS — I42.9 CARDIOMYOPATHY, UNSPECIFIED: ICD-10-CM

## 2025-04-22 DIAGNOSIS — I48.21 PERMANENT ATRIAL FIBRILLATION: ICD-10-CM

## 2025-04-22 DIAGNOSIS — E78.00 PURE HYPERCHOLESTEROLEMIA, UNSPECIFIED: ICD-10-CM

## 2025-04-22 PROCEDURE — 93000 ELECTROCARDIOGRAM COMPLETE: CPT

## 2025-04-22 PROCEDURE — G2211 COMPLEX E/M VISIT ADD ON: CPT

## 2025-04-22 PROCEDURE — 99215 OFFICE O/P EST HI 40 MIN: CPT

## 2025-04-23 LAB
ALBUMIN SERPL ELPH-MCNC: 4.2 G/DL
ALP BLD-CCNC: 113 U/L
ALT SERPL-CCNC: 18 U/L
ANION GAP SERPL CALC-SCNC: 14 MMOL/L
AST SERPL-CCNC: 16 U/L
BASOPHILS # BLD AUTO: 0.04 K/UL
BASOPHILS NFR BLD AUTO: 0.5 %
BILIRUB SERPL-MCNC: 0.4 MG/DL
BUN SERPL-MCNC: 20 MG/DL
CALCIUM SERPL-MCNC: 9.7 MG/DL
CHLORIDE SERPL-SCNC: 103 MMOL/L
CHOLEST SERPL-MCNC: 198 MG/DL
CO2 SERPL-SCNC: 23 MMOL/L
CREAT SERPL-MCNC: 0.96 MG/DL
DIGOXIN SERPL-MCNC: 1.1 NG/ML
EGFRCR SERPLBLD CKD-EPI 2021: 61 ML/MIN/1.73M2
EOSINOPHIL # BLD AUTO: 0.02 K/UL
EOSINOPHIL NFR BLD AUTO: 0.2 %
ESTIMATED AVERAGE GLUCOSE: 126 MG/DL
GLUCOSE SERPL-MCNC: 103 MG/DL
HBA1C MFR BLD HPLC: 6 %
HCT VFR BLD CALC: 49.8 %
HDLC SERPL-MCNC: 47 MG/DL
HGB BLD-MCNC: 16.5 G/DL
IMM GRANULOCYTES NFR BLD AUTO: 0.4 %
LDLC SERPL-MCNC: 123 MG/DL
LYMPHOCYTES # BLD AUTO: 1.7 K/UL
LYMPHOCYTES NFR BLD AUTO: 21.1 %
MAN DIFF?: NORMAL
MCHC RBC-ENTMCNC: 31.4 PG
MCHC RBC-ENTMCNC: 33.1 G/DL
MCV RBC AUTO: 94.9 FL
MONOCYTES # BLD AUTO: 0.53 K/UL
MONOCYTES NFR BLD AUTO: 6.6 %
NEUTROPHILS # BLD AUTO: 5.73 K/UL
NEUTROPHILS NFR BLD AUTO: 71.2 %
NONHDLC SERPL-MCNC: 151 MG/DL
NT-PROBNP SERPL-MCNC: 1285 PG/ML
PLATELET # BLD AUTO: 229 K/UL
POTASSIUM SERPL-SCNC: 5.2 MMOL/L
PROT SERPL-MCNC: 7.2 G/DL
RBC # BLD: 5.25 M/UL
RBC # FLD: 13.4 %
SODIUM SERPL-SCNC: 139 MMOL/L
TRIGL SERPL-MCNC: 160 MG/DL
TSH SERPL-ACNC: 1.11 UIU/ML
WBC # FLD AUTO: 8.05 K/UL

## 2025-04-24 ENCOUNTER — RX RENEWAL (OUTPATIENT)
Age: 80
End: 2025-04-24

## 2025-04-29 ENCOUNTER — RX RENEWAL (OUTPATIENT)
Age: 80
End: 2025-04-29

## 2025-07-21 ENCOUNTER — RX RENEWAL (OUTPATIENT)
Age: 80
End: 2025-07-21

## 2025-08-12 ENCOUNTER — RX RENEWAL (OUTPATIENT)
Age: 80
End: 2025-08-12